# Patient Record
Sex: FEMALE | Race: WHITE | Employment: OTHER | URBAN - NONMETROPOLITAN AREA
[De-identification: names, ages, dates, MRNs, and addresses within clinical notes are randomized per-mention and may not be internally consistent; named-entity substitution may affect disease eponyms.]

---

## 2022-03-27 PROCEDURE — 99284 EMERGENCY DEPT VISIT MOD MDM: CPT

## 2022-03-28 ENCOUNTER — HOSPITAL ENCOUNTER (EMERGENCY)
Age: 77
Discharge: HOME | End: 2022-03-28
Attending: EMERGENCY MEDICINE
Payer: MEDICARE

## 2022-03-28 ENCOUNTER — APPOINTMENT (OUTPATIENT)
Dept: GENERAL RADIOLOGY | Age: 77
End: 2022-03-28
Payer: MEDICARE

## 2022-03-28 VITALS
OXYGEN SATURATION: 92 % | WEIGHT: 206 LBS | TEMPERATURE: 97.8 F | BODY MASS INDEX: 34.32 KG/M2 | DIASTOLIC BLOOD PRESSURE: 92 MMHG | HEIGHT: 65 IN | RESPIRATION RATE: 17 BRPM | SYSTOLIC BLOOD PRESSURE: 159 MMHG | HEART RATE: 87 BPM

## 2022-03-28 DIAGNOSIS — L03.116 CELLULITIS OF LEFT LOWER EXTREMITY: ICD-10-CM

## 2022-03-28 DIAGNOSIS — S90.222A SUBUNGUAL HEMATOMA OF TOE OF LEFT FOOT, INITIAL ENCOUNTER: ICD-10-CM

## 2022-03-28 DIAGNOSIS — S90.112A CONTUSION OF LEFT GREAT TOE WITHOUT DAMAGE TO NAIL, INITIAL ENCOUNTER: Primary | ICD-10-CM

## 2022-03-28 PROCEDURE — 73630 X-RAY EXAM OF FOOT: CPT

## 2022-03-28 RX ORDER — TRAZODONE HYDROCHLORIDE 50 MG/1
50 TABLET ORAL NIGHTLY PRN
COMMUNITY
End: 2022-09-26 | Stop reason: SDUPTHER

## 2022-03-28 RX ORDER — METFORMIN HYDROCHLORIDE 500 MG/1
500 TABLET, FILM COATED, EXTENDED RELEASE ORAL 2 TIMES DAILY WITH MEALS
COMMUNITY
End: 2022-07-11 | Stop reason: ALTCHOICE

## 2022-03-28 RX ORDER — VITAMIN B COMPLEX
1 CAPSULE ORAL DAILY
COMMUNITY

## 2022-03-28 RX ORDER — CEPHALEXIN 500 MG/1
500 CAPSULE ORAL 3 TIMES DAILY
Qty: 21 CAPSULE | Refills: 0 | Status: SHIPPED | OUTPATIENT
Start: 2022-03-28 | End: 2022-04-04

## 2022-03-28 RX ORDER — LEVOTHYROXINE SODIUM 0.2 MG/1
200 TABLET ORAL DAILY
COMMUNITY

## 2022-03-28 RX ORDER — LOSARTAN POTASSIUM AND HYDROCHLOROTHIAZIDE 12.5; 5 MG/1; MG/1
1 TABLET ORAL DAILY
COMMUNITY

## 2022-03-28 NOTE — ED PROVIDER NOTES
[address] [facility] [place] EMERGENCY DEPT  eMERGENCY dEPARTMENT eNCOUnter      Pt Name: Jaleesa Drummond  MRN: [id number]  [place] 1945  Date of evaluation: 3/27/2022  Provider: Papo Epps MD    [address]       Chief Complaint   Patient presents with    Leg Swelling     left leg; dropped vaccum on foot last week and has progressively worsened with swelling and redness since         HISTORY OF PRESENT ILLNESS   (Location/Symptom, Timing/Onset,Context/Setting, Quality, Duration, Modifying Factors, Severity)  Note limiting factors. Jaleesa Drummond is a 78 y.o. female who presents to the emergency department for evaluation regarding left foot injury. Patient states that last week she dropped a vacuum  on her foot and has had some worsening pain and swelling ever since. She describes the pain is sharp in nature and without radiation. She is able to bear weight on the foot however it is somewhat painful. No other injury was sustained. States that she chronically has some swelling of her lower extremities bilaterally. No prior history of pulmonary embolism or DVT. HPI    NursingNotes were reviewed. REVIEW OF SYSTEMS    (2-9 systems for level 4, 10 or more for level 5)     Review of Systems   Constitutional: Negative for fever. Respiratory: Negative for shortness of breath. Cardiovascular: Negative for chest pain. Gastrointestinal: Negative for abdominal pain and vomiting. Musculoskeletal: Positive for gait problem and joint swelling. All other systems reviewed and are negative.            PAST MEDICALHISTORY     Past Medical History:   Diagnosis Date    Diabetes mellitus ([address]     Hypertension     Non-alcoholic cirrhosis ([facility]     Thyroid disease          SURGICAL HISTORY       Past Surgical History:   Procedure Laterality Date    CHOLECYSTECTOMY  1978    HYSTERECTOMY  2000    OVARY REMOVAL Bilateral 2000    AGE 50         CURRENT MEDICATIONS     Discharge Medication List as of 3/28/2022 3:43 AM      CONTINUE these medications which have NOT CHANGED    Details   losartan-hydroCHLOROthiazide (HYZAAR) 50-12.5 MG per tablet Take 1 tablet by mouth dailyHistorical Med      levothyroxine (SYNTHROID) 200 MCG tablet Take 200 mcg by mouth DailyHistorical Med      metFORMIN, MOD, (GLUMETZA) 500 MG extended release tablet Take 500 mg by mouth daily (with breakfast)Historical Med      b complex vitamins capsule Take 1 capsule by mouth dailyHistorical Med      traZODone (DESYREL) 50 MG tablet Take 50 mg by mouth nightly as needed for Sleep 1/2 tablet nightly as neededHistorical Med             ALLERGIES     Patient has no known allergies. FAMILY HISTORY       Family History   Problem Relation Age of Onset    Heart Disease Mother     Breast Cancer Maternal Aunt 67    Breast Cancer Maternal Aunt 76          SOCIAL HISTORY       Social History     Socioeconomic History    Marital status:      Spouse name: None    Number of children: None    Years of education: None    Highest education level: None   Occupational History    None   Tobacco Use    Smoking status: Never Smoker    Smokeless tobacco: Never Used   Vaping Use    Vaping Use: Never used   Substance and Sexual Activity    Alcohol use: Never    Drug use: Never    Sexual activity: None   Other Topics Concern    None   Social History Narrative    None     Social Determinants of Health     Financial Resource Strain: Low Risk     Difficulty of Paying Living Expenses: Not hard at all   Food Insecurity: No Food Insecurity    Worried About Running Out of Food in the Last Year: Never true    [address] in the Last Year: Never true   Transportation Needs:     Lack of Transportation (Medical): Not on file    Lack of Transportation (Non-Medical):  Not on file   Physical Activity:     Days of Exercise per Week: Not on file    Minutes of Exercise per Session: Not on file   Stress:     Feeling of Stress : Not on file   Social Connections:     Frequency of Communication with Friends and Family: Not on file    Frequency of Social Gatherings with Friends and Family: Not on file    Attends Spiritism Services: Not on file    Active Member of Clubs or Organizations: Not on file    Attends Club or Organization Meetings: Not on file    Marital Status: Not on file   Intimate Partner Violence:     Fear of Current or Ex-Partner: Not on file    Emotionally Abused: Not on file    Physically Abused: Not on file    Sexually Abused: Not on file   Housing Stability:     Unable to Pay for Housing in the Last Year: Not on file    Number of [place] in the Last Year: Not on file    Unstable Housing in the Last Year: Not on file       SCREENINGS    Nate Coma Scale  Eye Opening: Spontaneous  Best Verbal Response: Oriented  Best Motor Response: Obeys commands  [place] Coma Scale Score: 15        PHYSICAL EXAM    (up to 7 for level 4, 8 or more for level 5)     ED Triage Vitals [03/27/22 2103]   BP Temp Temp Source Pulse Resp SpO2 Height Weight   (!) 190/83 97.8 °F (36.6 °C) Infrared 99 17 98 % 5' 5\" (1.651 m) 206 lb (93.4 kg)       Physical Exam  HENT:      Head: Atraumatic. Pulmonary:      Effort: No respiratory distress. Musculoskeletal:        Feet:       Comments: There is tenderness and swelling as noted on the diagram.  There is a small subungual hematoma in the left great toe. No tenderness in the midfoot or ankle area. Skin:            Comments: There is some overlying erythema in the left lower extremity that appears to be greater than the right. There is also some warmth. She appears to have some chronic venous stasis with some element of skin breakdown. No ulceration identified. Neurological:      Mental Status: She is alert.          DIAGNOSTIC RESULTS       RADIOLOGY:  Non-plain film images such as CT, Ultrasound and MRI are read by the radiologist. Plain radiographic images are visualized and preliminarily interpreted bythe emergency physician with the below findings:      No acute fracture identified. LABS:  Labs Reviewed - No data to display    All other labs were within normal range or not returned as of this dictation. EMERGENCY DEPARTMENT COURSE and DIFFERENTIAL DIAGNOSIS/MDM:   Vitals:    Vitals:    03/27/22 2103 03/28/22 0330   BP: (!) 190/83 (!) 159/92   Pulse: 99 87   Resp: 17    Temp: 97.8 °F (36.6 °C)    TempSrc: Infrared    SpO2: 98% 92%   Weight: 206 lb (93.4 kg)    Height: 5' 5\" (1.651 m)        MDM    PROCEDURES:  Unless otherwise noted below, none     Procedures    FINAL IMPRESSION      1. Contusion of left great toe without damage to nail, initial encounter    2. Subungual hematoma of toe of left foot, initial encounter    3.  Cellulitis of left lower extremity          DISPOSITION/PLAN   DISPOSITION Decision To Discharge 03/28/2022 03:39:31 AM      PATIENT REFERRED TO:  Linda Keita MD  [address]  [phone]            DISCHARGE MEDICATIONS:  Discharge Medication List as of 3/28/2022  3:43 AM      START taking these medications    Details   cephALEXin (KEFLEX) 500 MG capsule Take 1 capsule by mouth 3 times daily for 7 days, Disp-21 capsule, R-0Normal                (Please note that portions of this note were completed with a voice recognition program.  Efforts were made to edit thedictations but occasionally words are mis-transcribed.)    Author Dallas MD (electronically signed)  Attending Emergency Physician         Author Dallas MD  04/16/22 [id number]

## 2022-04-11 ENCOUNTER — OFFICE VISIT (OUTPATIENT)
Dept: INTERNAL MEDICINE | Age: 77
End: 2022-04-11
Payer: MEDICARE

## 2022-04-11 VITALS
BODY MASS INDEX: 34.99 KG/M2 | DIASTOLIC BLOOD PRESSURE: 60 MMHG | HEIGHT: 65 IN | SYSTOLIC BLOOD PRESSURE: 138 MMHG | OXYGEN SATURATION: 99 % | HEART RATE: 90 BPM | WEIGHT: 210 LBS

## 2022-04-11 DIAGNOSIS — E55.9 VITAMIN D DEFICIENCY: ICD-10-CM

## 2022-04-11 DIAGNOSIS — F51.01 PRIMARY INSOMNIA: ICD-10-CM

## 2022-04-11 DIAGNOSIS — E03.8 OTHER SPECIFIED HYPOTHYROIDISM: ICD-10-CM

## 2022-04-11 DIAGNOSIS — I10 PRIMARY HYPERTENSION: ICD-10-CM

## 2022-04-11 DIAGNOSIS — L03.116 CELLULITIS OF LEFT LOWER EXTREMITY: ICD-10-CM

## 2022-04-11 DIAGNOSIS — E53.8 VITAMIN B 12 DEFICIENCY: ICD-10-CM

## 2022-04-11 DIAGNOSIS — Z78.0 POSTMENOPAUSAL: ICD-10-CM

## 2022-04-11 DIAGNOSIS — K75.81 NASH (NONALCOHOLIC STEATOHEPATITIS): Primary | ICD-10-CM

## 2022-04-11 DIAGNOSIS — Z12.31 ENCOUNTER FOR SCREENING MAMMOGRAM FOR BREAST CANCER: ICD-10-CM

## 2022-04-11 DIAGNOSIS — E11.9 CONTROLLED TYPE 2 DIABETES MELLITUS WITHOUT COMPLICATION, WITHOUT LONG-TERM CURRENT USE OF INSULIN (HCC): ICD-10-CM

## 2022-04-11 PROBLEM — G47.00 INSOMNIA: Status: ACTIVE | Noted: 2022-04-11

## 2022-04-11 PROCEDURE — G8417 CALC BMI ABV UP PARAM F/U: HCPCS | Performed by: NURSE PRACTITIONER

## 2022-04-11 PROCEDURE — G8400 PT W/DXA NO RESULTS DOC: HCPCS | Performed by: NURSE PRACTITIONER

## 2022-04-11 PROCEDURE — 2022F DILAT RTA XM EVC RTNOPTHY: CPT | Performed by: NURSE PRACTITIONER

## 2022-04-11 PROCEDURE — 99204 OFFICE O/P NEW MOD 45 MIN: CPT | Performed by: NURSE PRACTITIONER

## 2022-04-11 PROCEDURE — 1123F ACP DISCUSS/DSCN MKR DOCD: CPT | Performed by: NURSE PRACTITIONER

## 2022-04-11 PROCEDURE — 1036F TOBACCO NON-USER: CPT | Performed by: NURSE PRACTITIONER

## 2022-04-11 PROCEDURE — 3046F HEMOGLOBIN A1C LEVEL >9.0%: CPT | Performed by: NURSE PRACTITIONER

## 2022-04-11 PROCEDURE — 4040F PNEUMOC VAC/ADMIN/RCVD: CPT | Performed by: NURSE PRACTITIONER

## 2022-04-11 PROCEDURE — G8427 DOCREV CUR MEDS BY ELIG CLIN: HCPCS | Performed by: NURSE PRACTITIONER

## 2022-04-11 PROCEDURE — 1090F PRES/ABSN URINE INCON ASSESS: CPT | Performed by: NURSE PRACTITIONER

## 2022-04-11 PROCEDURE — 3017F COLORECTAL CA SCREEN DOC REV: CPT | Performed by: NURSE PRACTITIONER

## 2022-04-11 SDOH — ECONOMIC STABILITY: FOOD INSECURITY: WITHIN THE PAST 12 MONTHS, YOU WORRIED THAT YOUR FOOD WOULD RUN OUT BEFORE YOU GOT MONEY TO BUY MORE.: NEVER TRUE

## 2022-04-11 SDOH — ECONOMIC STABILITY: FOOD INSECURITY: WITHIN THE PAST 12 MONTHS, THE FOOD YOU BOUGHT JUST DIDN'T LAST AND YOU DIDN'T HAVE MONEY TO GET MORE.: NEVER TRUE

## 2022-04-11 ASSESSMENT — PATIENT HEALTH QUESTIONNAIRE - PHQ9
SUM OF ALL RESPONSES TO PHQ9 QUESTIONS 1 & 2: 0
1. LITTLE INTEREST OR PLEASURE IN DOING THINGS: 0
SUM OF ALL RESPONSES TO PHQ QUESTIONS 1-9: 0
2. FEELING DOWN, DEPRESSED OR HOPELESS: 0

## 2022-04-11 ASSESSMENT — ENCOUNTER SYMPTOMS
SHORTNESS OF BREATH: 0
ABDOMINAL DISTENTION: 0
VOMITING: 0
WHEEZING: 0
DIARRHEA: 0
TROUBLE SWALLOWING: 0
NAUSEA: 0
COUGH: 0
CONSTIPATION: 0
EYE ITCHING: 0
CHOKING: 0
BLOOD IN STOOL: 0
EYE DISCHARGE: 0
STRIDOR: 0
COLOR CHANGE: 0
ABDOMINAL PAIN: 0
SORE THROAT: 0

## 2022-04-11 ASSESSMENT — SOCIAL DETERMINANTS OF HEALTH (SDOH): HOW HARD IS IT FOR YOU TO PAY FOR THE VERY BASICS LIKE FOOD, HOUSING, MEDICAL CARE, AND HEATING?: NOT HARD AT ALL

## 2022-04-11 NOTE — PATIENT INSTRUCTIONS
1.  Cellulitis  Resolved  2. MILLER:  Has been stable refer GI   3. Type II DM; Labs today   4. Hypertension; The current medical regimen is effective;  continue present plan and medications. 5. Hypothyroidism, The current medical regimen is effective;  continue present plan and medications. 6.  Insomnia; Stable with trazodone prn    7.  health maintenance;    Last colon 4 years ago   Setup dexa and mammogram;

## 2022-04-11 NOTE — PROGRESS NOTES
[facility] PHYSICIAN SERVICES  [facility] INTERNAL MEDICINE  [address] [address] [place] [address]  Dept: [phone]  Dept Fax: [phone]: [phone]    Jenny Mcdaniels (:  1945) is a 78 y.o. female,new Patient   with green, here for evaluation of the following chief complaint(s): Establish Care (was recently seen in er for celulitis and cephalenix 500 is finished.)      Jenny Mcdaniels is a 78 y.o. female who presents today for her medical conditions/complaints as noted below. Jenny Mcdaniels is c/byron Establish Care (was recently seen in er for celulitis and cephalenix 500 is finished.)        HPI:     Chief Complaint   Patient presents with   Scotty Espitia Establish Care     was recently seen in er for celulitis and cephalenix 500 is finished. HPI   1. Cellulitis had ER visit this weekend;   2. Monda Mass she tries to control with diet;  Meat seems to cause some swelling   Constipation   3. TYpe II DM metformin  500 BID  Her last a1c is usually 6.8  Or so    She has no hypogllycemia   4. Hypertension b/pi is good today   She is onl osartan /HCTZ 12.5 daily   5. Hypothyroid she is on 200 mcg daily levothyroxine  Last check was normal   6. Insomnia; Stable with trazodone 50 prn sleep  She doesn't take it every night;   7.   Health maintenance   Last colonoscopy 4 years  Ago wit 8 year plan  Mammogram she no longer gets mammograms since her     Past Medical History:   Diagnosis Date    Diabetes mellitus ([address]     Hypertension     Non-alcoholic cirrhosis ([address]     Thyroid disease       Past Surgical History:   Procedure Laterality Date    CHOLECYSTECTOMY      HYSTERECTOMY         Vitals 2022 3/28/2022    SYSTOLIC 634 822 160   DIASTOLIC 60 92 83   Pulse 90 87 99   Temp - - 97.8   Resp - - 17   SpO2 99 92 98   Weight 210 lb - 206 lb   Height 5' 5\" - 5' 5\"   Body mass index 34.94 kg/m2 - 34.28 kg/m2       Family History   Problem Relation Age of Onset    Heart Disease Mother        Social History     Tobacco Use    Smoking status: Never Smoker    Smokeless tobacco: Never Used   Substance Use Topics    Alcohol use: Never      Current Outpatient Medications   Medication Sig Dispense Refill    losartan-hydroCHLOROthiazide (HYZAAR) 50-12.5 MG per tablet Take 1 tablet by mouth daily      levothyroxine (SYNTHROID) 200 MCG tablet Take 200 mcg by mouth Daily      metFORMIN, MOD, (GLUMETZA) 500 MG extended release tablet Take 500 mg by mouth 2 times daily (with meals)       b complex vitamins capsule Take 1 capsule by mouth daily      traZODone (DESYREL) 50 MG tablet Take 50 mg by mouth nightly as needed for Sleep 1/2 tablet nightly as needed       No current facility-administered medications for this visit.      No Known Allergies    Health Maintenance   Topic Date Due    TSH testing  Never done    Potassium monitoring  Never done    Creatinine monitoring  Never done    Hepatitis C screen  Never done    COVID-19 Vaccine (1) Never done    Diabetic foot exam  Never done    A1C test (Diabetic or Prediabetic)  Never done    Lipid screen  Never done    Diabetic microalbuminuria test  Never done    Diabetic retinal exam  Never done    Colorectal Cancer Screen  Never done    Breast cancer screen  Never done    DEXA (modify frequency per FRAX score)  Never done    Annual Wellness Visit (AWV)  03/28/2022    DTaP/Tdap/Td vaccine (1 - Tdap) 05/02/2022 (Originally 5/10/1966)    Shingles Vaccine (1 of 2) 04/11/2023 (Originally 5/10/1997)    Pneumococcal 65+ years Vaccine (1 of 1 - PPSV23) 04/15/2024 (Originally 5/10/2012)    Flu vaccine (Season Ended) 09/01/2022    Depression Screen  04/11/2023    Hepatitis A vaccine  Aged Out    Hib vaccine  Aged Out    Meningococcal (ACWY) vaccine  Aged Out       No results found for: LABA1C  No results found for: PSA, PSADIA  No results found for: TSH]  No results found for: NA, K, CL, CO2, BUN, CREATININE, GLUCOSE, CALCIUM, PROT, LABALBU, BILITOT, ALKPHOS, AST, ALT, LABGLOM, GFRAA, AGRATIO, GLOB  No results found for: CHOL  No results found for: TRIG  No results found for: HDL  No results found for: LDLCHOLESTEROL, LDLCALC  No results found for: NA, K, CL, CO2, BUN, CREATININE, GLUCOSE, CALCIUM   No results found for: WBC, HGB, HCT, MCV, PLT, LABLYMP, MID, GRAN, LYMPHOPCT, MIDPERCENT, GRANULOCYTES, RBC, MCH, MCHC, RDW  No results found for: VITD25    Subjective:      Review of Systems   Constitutional: Negative for fatigue, fever and unexpected weight change. HENT: Negative for ear discharge, ear pain, mouth sores, sore throat and trouble swallowing. Eyes: Negative for discharge, itching and visual disturbance. Respiratory: Negative for cough, choking, shortness of breath, wheezing and stridor. Cardiovascular: Positive for leg swelling. Negative for chest pain and palpitations. Gastrointestinal: Negative for abdominal distention, abdominal pain, blood in stool, constipation, diarrhea, nausea and vomiting. Endocrine: Negative for cold intolerance, polydipsia and polyuria. Genitourinary: Negative for difficulty urinating, dysuria, frequency and urgency. Musculoskeletal: Negative for arthralgias and gait problem. Skin: Negative for color change and rash. Allergic/Immunologic: Negative for food allergies and immunocompromised state. Neurological: Negative for dizziness, tremors, syncope, speech difficulty, weakness and headaches. Hematological: Negative for adenopathy. Does not bruise/bleed easily. Psychiatric/Behavioral: Positive for sleep disturbance. Negative for confusion and hallucinations. Objective:     Physical Exam  Constitutional:       General: She is not in acute distress. Appearance: She is well-developed. HENT:      Head: Normocephalic and atraumatic. Eyes:      General: No scleral icterus. Right eye: No discharge. Left eye: No discharge.       Pupils: Pupils are equal, round, and reactive to light. Neck:      Thyroid: No thyromegaly. Vascular: No JVD. Cardiovascular:      Rate and Rhythm: Normal rate and regular rhythm. Heart sounds: Normal heart sounds. No murmur heard. Pulmonary:      Effort: Pulmonary effort is normal. No respiratory distress. Breath sounds: Normal breath sounds. No wheezing or rales. Abdominal:      General: Bowel sounds are normal. There is no distension. Palpations: Abdomen is soft. There is no mass. Tenderness: There is no abdominal tenderness. There is no guarding or rebound. Musculoskeletal:         General: No tenderness. Normal range of motion. Cervical back: Normal range of motion and neck supple. Right lower leg: 3+ Edema present. Left lower leg: 3+ Edema present. Skin:     General: Skin is warm and dry. Findings: No erythema or rash. Neurological:      Mental Status: She is alert and oriented to person, place, and time. Cranial Nerves: No cranial nerve deficit. Coordination: Coordination normal.      Deep Tendon Reflexes: Reflexes are normal and symmetric. Reflexes normal.   Psychiatric:         Mood and Affect: Mood is not depressed. Behavior: Behavior normal.         Thought Content:  Thought content normal.         Judgment: Judgment normal.       /60   Pulse 90   Ht 5' 5\" (1.651 m)   Wt 210 lb (95.3 kg)   SpO2 99%   BMI 34.95 kg/m²           Assessment:      Problem List     Cellulitis of left lower extremity     Has resolved with keflex           Controlled type 2 diabetes mellitus without complication, without long-term current use of insulin (HCC)     Stable with metformin 500 BID           Relevant Medications    metFORMIN, MOD, (GLUMETZA) 500 MG extended release tablet    Other Relevant Orders    Hemoglobin A1C    Insomnia     Stable with prn use of trazodone           MILLER (nonalcoholic steatohepatitis) - Primary     She was seen by specialist but told her nothing really to do           Relevant Orders    Hepatitis C Antibody    Other specified hypothyroidism     Stable with 200 mcg levothyroxine           Relevant Medications    levothyroxine (SYNTHROID) 200 MCG tablet    Other Relevant Orders    TSH    Primary hypertension     Stable with losartan 50/hctz  12.5           Relevant Orders    CBC with Auto Differential    Comprehensive Metabolic Panel    Urinalysis with Reflex to Culture    Lipid Panel          Plan:        Patient given educational materials - see patient instructions. Discussed use, benefit, and side effects of prescribed medications. Allpatient questions answered. Pt voiced understanding. Reviewed health maintenance. Instructed to continue current medications, diet and exercise. Patient agreed with treatment plan. Follow up as directed. MEDICATIONS:  No orders of the defined types were placed in this encounter. ORDERS:  Orders Placed This Encounter   Procedures    SETH DIGITAL SCREEN W OR WO CAD BILATERAL    DEXA BONE DENSITY 2 SITES    Hepatitis C Antibody    CBC with Auto Differential    Comprehensive Metabolic Panel    Hemoglobin A1C    Vitamin D 25 Hydroxy    Urinalysis with Reflex to Culture    Lipid Panel    TSH       Follow-up:  Return in about 3 months (around 7/11/2022) for have labs done prior to appt. PATIENT INSTRUCTIONS:  Patient Instructions   1. Cellulitis  Resolved  2. MILLER:  Has been stable refer GI   3. Type II DM; Labs today   4. Hypertension; The current medical regimen is effective;  continue present plan and medications. 5. Hypothyroidism, The current medical regimen is effective;  continue present plan and medications. 6.  Insomnia; Stable with trazodone prn    7.  health maintenance;    Last colon 4 years ago   Setup dexa and mammogram;       Electronically signed by KATIA Robbins on 4/11/2022 at 11:32 AM    @    Jazmin/transcription disclaimer:  Much of this encounter note is electronic transcription/translation of spoken language to printed texts. The electronic translation of spoken language may be erroneous, or at times,nonsensical words or phrases may be inadvertently transcribed.   Although I have reviewed the note for such errors, some may still exist.

## 2022-04-14 ENCOUNTER — HOSPITAL ENCOUNTER (OUTPATIENT)
Dept: WOMENS IMAGING | Age: 77
Discharge: HOME | End: 2022-04-14
Payer: MEDICARE

## 2022-04-14 ENCOUNTER — TELEPHONE (OUTPATIENT)
Dept: INTERNAL MEDICINE | Age: 77
End: 2022-04-14

## 2022-04-14 DIAGNOSIS — Z78.0 POSTMENOPAUSAL: ICD-10-CM

## 2022-04-14 DIAGNOSIS — Z12.31 ENCOUNTER FOR SCREENING MAMMOGRAM FOR BREAST CANCER: ICD-10-CM

## 2022-04-14 PROCEDURE — 77080 DXA BONE DENSITY AXIAL: CPT

## 2022-04-14 PROCEDURE — 77063 BREAST TOMOSYNTHESIS BI: CPT

## 2022-04-14 NOTE — TELEPHONE ENCOUNTER
I called and left message for patient to call us back with the location so we could request the results of c-scope and I can scanned it into her chart.

## 2022-04-14 NOTE — TELEPHONE ENCOUNTER
----- Message from Shannon Alan sent at 4/14/2022 10:26 AM CDT -----  Subject: Message to Provider    QUESTIONS  Information for Provider? Pt the last colonoscopy was 6/9/14 This was the   information her doctor was waiting for.   ---------------------------------------------------------------------------  --------------  [address]  What is the best way for the office to contact you? OK to leave message on   voicemail  Preferred Call Back Phone Number? [phone]  ---------------------------------------------------------------------------  --------------  SCRIPT ANSWERS  Relationship to Patient?  Self

## 2022-04-15 ENCOUNTER — HOSPITAL ENCOUNTER (OUTPATIENT)
Dept: WOMENS IMAGING | Age: 77
Discharge: HOME | End: 2022-04-15
Payer: MEDICARE

## 2022-04-15 DIAGNOSIS — N64.89 BREAST ASYMMETRY: ICD-10-CM

## 2022-04-15 DIAGNOSIS — E03.8 OTHER SPECIFIED HYPOTHYROIDISM: ICD-10-CM

## 2022-04-15 DIAGNOSIS — I10 PRIMARY HYPERTENSION: ICD-10-CM

## 2022-04-15 DIAGNOSIS — E11.9 CONTROLLED TYPE 2 DIABETES MELLITUS WITHOUT COMPLICATION, WITHOUT LONG-TERM CURRENT USE OF INSULIN (HCC): ICD-10-CM

## 2022-04-15 DIAGNOSIS — E55.9 VITAMIN D DEFICIENCY: ICD-10-CM

## 2022-04-15 DIAGNOSIS — K75.81 NASH (NONALCOHOLIC STEATOHEPATITIS): ICD-10-CM

## 2022-04-15 DIAGNOSIS — E53.8 VITAMIN B 12 DEFICIENCY: ICD-10-CM

## 2022-04-15 LAB
ALBUMIN SERPL-MCNC: 3.9 G/DL (ref 3.5–5.2)
ALP BLD-CCNC: 136 U/L (ref 35–104)
ALT SERPL-CCNC: 23 U/L (ref 5–33)
ANION GAP SERPL CALCULATED.3IONS-SCNC: 11 MMOL/L (ref 7–19)
AST SERPL-CCNC: 41 U/L (ref 5–32)
BACTERIA: ABNORMAL /HPF
BASOPHILS ABSOLUTE: 0 K/UL (ref 0–0.2)
BASOPHILS RELATIVE PERCENT: 0.4 % (ref 0–1)
BILIRUB SERPL-MCNC: 1.4 MG/DL (ref 0.2–1.2)
BILIRUBIN URINE: NEGATIVE
BLOOD, URINE: NEGATIVE
BUN BLDV-MCNC: 13 MG/DL (ref 8–23)
CALCIUM SERPL-MCNC: 9.9 MG/DL (ref 8.8–10.2)
CHLORIDE BLD-SCNC: 104 MMOL/L (ref 98–111)
CHOLESTEROL, TOTAL: 170 MG/DL (ref 160–199)
CLARITY: CLEAR
CO2: 28 MMOL/L (ref 22–29)
COLOR: YELLOW
CREAT SERPL-MCNC: 0.8 MG/DL (ref 0.5–0.9)
CRYSTALS, UA: ABNORMAL /HPF
EOSINOPHILS ABSOLUTE: 0.2 K/UL (ref 0–0.6)
EOSINOPHILS RELATIVE PERCENT: 4.6 % (ref 0–5)
EPITHELIAL CELLS, UA: 10 /HPF (ref 0–5)
GFR AFRICAN AMERICAN: >59
GFR NON-AFRICAN AMERICAN: >60
GLUCOSE BLD-MCNC: 133 MG/DL (ref 74–109)
GLUCOSE URINE: NEGATIVE MG/DL
HBA1C MFR BLD: 5.4 % (ref 4–6)
HCT VFR BLD CALC: 34.7 % (ref 37–47)
HDLC SERPL-MCNC: 71 MG/DL (ref 65–121)
HEMOGLOBIN: 11.6 G/DL (ref 12–16)
HEPATITIS C ANTIBODY INTERPRETATION: NORMAL
HYALINE CASTS: 7 /HPF (ref 0–8)
IMMATURE GRANULOCYTES #: 0 K/UL
KETONES, URINE: NEGATIVE MG/DL
LDL CHOLESTEROL CALCULATED: 79 MG/DL
LEUKOCYTE ESTERASE, URINE: ABNORMAL
LYMPHOCYTES ABSOLUTE: 1.2 K/UL (ref 1.1–4.5)
LYMPHOCYTES RELATIVE PERCENT: 22.4 % (ref 20–40)
MCH RBC QN AUTO: 30.8 PG (ref 27–31)
MCHC RBC AUTO-ENTMCNC: 33.4 G/DL (ref 33–37)
MCV RBC AUTO: 92 FL (ref 81–99)
MONOCYTES ABSOLUTE: 0.4 K/UL (ref 0–0.9)
MONOCYTES RELATIVE PERCENT: 6.6 % (ref 0–10)
NEUTROPHILS ABSOLUTE: 3.5 K/UL (ref 1.5–7.5)
NEUTROPHILS RELATIVE PERCENT: 65.8 % (ref 50–65)
NITRITE, URINE: NEGATIVE
PDW BLD-RTO: 15.1 % (ref 11.5–14.5)
PH UA: 8 (ref 5–8)
PLATELET # BLD: 88 K/UL (ref 130–400)
PMV BLD AUTO: 10.1 FL (ref 9.4–12.3)
POTASSIUM SERPL-SCNC: 4.6 MMOL/L (ref 3.5–5)
PROTEIN UA: NEGATIVE MG/DL
RBC # BLD: 3.77 M/UL (ref 4.2–5.4)
RBC UA: 1 /HPF (ref 0–4)
SODIUM BLD-SCNC: 143 MMOL/L (ref 136–145)
SPECIFIC GRAVITY UA: 1.01 (ref 1–1.03)
TOTAL PROTEIN: 6.6 G/DL (ref 6.6–8.7)
TRIGL SERPL-MCNC: 100 MG/DL (ref 0–149)
TSH SERPL DL<=0.05 MIU/L-ACNC: 0.02 UIU/ML (ref 0.27–4.2)
UROBILINOGEN, URINE: 1 E.U./DL
VITAMIN B-12: 816 PG/ML (ref 211–946)
VITAMIN D 25-HYDROXY: 37.5 NG/ML
WBC # BLD: 5.3 K/UL (ref 4.8–10.8)
WBC UA: 2 /HPF (ref 0–5)

## 2022-04-15 PROCEDURE — 77065 DX MAMMO INCL CAD UNI: CPT

## 2022-04-16 ASSESSMENT — ENCOUNTER SYMPTOMS
VOMITING: 0
SHORTNESS OF BREATH: 0
ABDOMINAL PAIN: 0

## 2022-04-18 DIAGNOSIS — E03.8 OTHER SPECIFIED HYPOTHYROIDISM: Primary | ICD-10-CM

## 2022-04-19 DIAGNOSIS — R92.1 CALCIFICATION OF BREAST: Primary | ICD-10-CM

## 2022-04-27 ENCOUNTER — HOSPITAL ENCOUNTER (OUTPATIENT)
Dept: WOMENS IMAGING | Age: 77
Discharge: HOME | End: 2022-04-27
Payer: MEDICARE

## 2022-04-27 DIAGNOSIS — R92.0 MICROCALCIFICATIONS OF THE BREAST: ICD-10-CM

## 2022-04-27 PROCEDURE — 2720000010 MAM STEREO BREAST BX W LOC DEVICE 1ST LESION RIGHT

## 2022-04-27 PROCEDURE — 77065 DX MAMMO INCL CAD UNI: CPT

## 2022-04-27 PROCEDURE — 88305 TISSUE EXAM BY PATHOLOGIST: CPT

## 2022-05-02 ENCOUNTER — TELEPHONE (OUTPATIENT)
Dept: INTERNAL MEDICINE | Age: 77
End: 2022-05-02

## 2022-05-02 RX ORDER — ERGOCALCIFEROL 1.25 MG/1
50000 CAPSULE ORAL WEEKLY
Qty: 12 CAPSULE | Refills: 1 | Status: SHIPPED | OUTPATIENT
Start: 2022-05-02 | End: 2022-10-18

## 2022-05-02 NOTE — TELEPHONE ENCOUNTER
Pt said was told at visit we will send in a rx for vit d and it has never been sent. Dont see strength in note? ??

## 2022-05-02 NOTE — TELEPHONE ENCOUNTER
Amairani Patron called requesting a refill of the below medication which has been pended for you:     Requested Prescriptions     Pending Prescriptions Disp Refills    vitamin D (ERGOCALCIFEROL) 1.25 MG (77451 UT) CAPS capsule 12 capsule 1     Sig: Take 1 capsule by mouth once a week       Last Appointment Date: 4/11/2022  Next Appointment Date: 7/11/2022    No Known Allergies

## 2022-05-03 ENCOUNTER — TELEPHONE (OUTPATIENT)
Dept: INTERNAL MEDICINE | Age: 77
End: 2022-05-03

## 2022-05-03 DIAGNOSIS — Z12.31 ENCOUNTER FOR SCREENING MAMMOGRAM FOR MALIGNANT NEOPLASM OF BREAST: Primary | ICD-10-CM

## 2022-05-03 DIAGNOSIS — R92.0 MICROCALCIFICATIONS OF THE BREAST: ICD-10-CM

## 2022-05-03 NOTE — TELEPHONE ENCOUNTER
----- Message from KATIA Jackson sent at 5/1/2022  5:48 PM CDT -----  Pelase go ahead and put in 6 month fu for mammogram

## 2022-05-05 ENCOUNTER — TELEPHONE (OUTPATIENT)
Dept: INTERNAL MEDICINE | Age: 77
End: 2022-05-05

## 2022-05-05 NOTE — TELEPHONE ENCOUNTER
----- Message from KATIA Cruz sent at 5/5/2022  7:19 AM CDT -----  Please make this into the chart note  I cannot find her way let her know that her biopsy was negative for malignancy.   Please let her know that in addition let her know that we will be doing a 6-month mammogram for follow-up in November I think of already asking to put that order in

## 2022-05-13 ENCOUNTER — OFFICE VISIT (OUTPATIENT)
Dept: GASTROENTEROLOGY | Age: 77
End: 2022-05-13
Payer: MEDICARE

## 2022-05-13 VITALS
SYSTOLIC BLOOD PRESSURE: 139 MMHG | HEART RATE: 86 BPM | DIASTOLIC BLOOD PRESSURE: 70 MMHG | OXYGEN SATURATION: 98 % | HEIGHT: 65 IN | WEIGHT: 212 LBS | BODY MASS INDEX: 35.32 KG/M2

## 2022-05-13 DIAGNOSIS — E66.9 CLASS 2 OBESITY WITH BODY MASS INDEX (BMI) OF 35.0 TO 35.9 IN ADULT, UNSPECIFIED OBESITY TYPE, UNSPECIFIED WHETHER SERIOUS COMORBIDITY PRESENT: ICD-10-CM

## 2022-05-13 DIAGNOSIS — K76.0 FATTY LIVER: Primary | ICD-10-CM

## 2022-05-13 PROBLEM — E66.812 CLASS 2 OBESITY WITH BODY MASS INDEX (BMI) OF 35.0 TO 35.9 IN ADULT: Status: ACTIVE | Noted: 2022-05-13

## 2022-05-13 PROCEDURE — G8417 CALC BMI ABV UP PARAM F/U: HCPCS | Performed by: NURSE PRACTITIONER

## 2022-05-13 PROCEDURE — G8427 DOCREV CUR MEDS BY ELIG CLIN: HCPCS | Performed by: NURSE PRACTITIONER

## 2022-05-13 PROCEDURE — 3017F COLORECTAL CA SCREEN DOC REV: CPT | Performed by: NURSE PRACTITIONER

## 2022-05-13 PROCEDURE — 99203 OFFICE O/P NEW LOW 30 MIN: CPT | Performed by: NURSE PRACTITIONER

## 2022-05-13 PROCEDURE — G8399 PT W/DXA RESULTS DOCUMENT: HCPCS | Performed by: NURSE PRACTITIONER

## 2022-05-13 PROCEDURE — 1123F ACP DISCUSS/DSCN MKR DOCD: CPT | Performed by: NURSE PRACTITIONER

## 2022-05-13 PROCEDURE — 1036F TOBACCO NON-USER: CPT | Performed by: NURSE PRACTITIONER

## 2022-05-13 PROCEDURE — 1090F PRES/ABSN URINE INCON ASSESS: CPT | Performed by: NURSE PRACTITIONER

## 2022-05-13 PROCEDURE — 4040F PNEUMOC VAC/ADMIN/RCVD: CPT | Performed by: NURSE PRACTITIONER

## 2022-05-13 ASSESSMENT — ENCOUNTER SYMPTOMS
SHORTNESS OF BREATH: 0
ANAL BLEEDING: 0
DIARRHEA: 0
COUGH: 0
RECTAL PAIN: 0
BLOOD IN STOOL: 0
NAUSEA: 0
ABDOMINAL DISTENTION: 0
CONSTIPATION: 0
SORE THROAT: 0
BACK PAIN: 0
VOMITING: 0
TROUBLE SWALLOWING: 0
VOICE CHANGE: 0
ABDOMINAL PAIN: 0

## 2022-05-13 NOTE — PROGRESS NOTES
Subjective:      Magaly Diaz is a76 y.o. female  Chief Complaint   Patient presents with    New Patient     fatty liver disease       HPI  PCP: KATIA Abraham  Referring Provider: KATIA Anaya  New pt referral  From PCP  For fatty liver. Reports she was diagnosed with fatty liver in 2018. Recent labs 4/2022 reveals normal ALT, AST of 41 (<32)  Is obese with BMI of 35      Reports she had an episode of constipation recently but that resolved and is back to soft formed BMs daily  No further GI complaints    Reports her last colonoscopy was in 2014 and she was given a 10 yr recall  No personal or family hx of colon polyps/colon cancer      Family HX:    Pt denies family hx of colon polyps, colon CA, inflammatory bowel dx, gastric CA and esophageal CA.     Past Medical History:   Diagnosis Date    Diabetes mellitus ([facility]     Hypertension     Non-alcoholic cirrhosis ([facility]     Thyroid disease           Past Surgical History:   Procedure Laterality Date    CHOLECYSTECTOMY  1978    COLONOSCOPY  2014    normal, per pt    HYSTERECTOMY  2000    SETH STEROTACTIC LOC BREAST BIOPSY RIGHT Right 04/27/2022    SETH STEROTACTIC LOC BREAST BIOPSY RIGHT 4/27/2022 [facility]    OVARY REMOVAL Bilateral 2000    AGE 50       Social History     Socioeconomic History    Marital status:      Spouse name: None    Number of children: None    Years of education: None    Highest education level: None   Occupational History    None   Tobacco Use    Smoking status: Never Smoker    Smokeless tobacco: Never Used   Vaping Use    Vaping Use: Never used   Substance and Sexual Activity    Alcohol use: Never    Drug use: Never    Sexual activity: None   Other Topics Concern    None   Social History Narrative    None     Social Determinants of Health     Financial Resource Strain: Low Risk     Difficulty of Paying Living Expenses: Not hard at all   Food Insecurity: No Food Insecurity    Worried About Running Out of Food in the Last Year: Never true    Ran Out of Food in the Last Year: Never true   Transportation Needs:     Lack of Transportation (Medical): Not on file    Lack of Transportation (Non-Medical): Not on file   Physical Activity:     Days of Exercise per Week: Not on file    Minutes of Exercise per Session: Not on file   Stress:     Feeling of Stress : Not on file   Social Connections:     Frequency of Communication with Friends and Family: Not on file    Frequency of Social Gatherings with Friends and Family: Not on file    Attends [employer] Services: Not on file    Active Member of [address] [place] [address] [employer]," or Organizations: Not on file    Attends Club or Organization Meetings: Not on file    Marital Status: Not on file   Intimate Partner Violence:     Fear of Current or Ex-Partner: Not on file    Emotionally Abused: Not on file    Physically Abused: Not on file    Sexually Abused: Not on file   Housing Stability:     Unable to Pay for Housing in the Last Year: Not on file    Number of [place] in the Last Year: Not on file    Unstable Housing in the Last Year: Not on file       No Known Allergies    Current Outpatient Medications   Medication Sig Dispense Refill    vitamin D (ERGOCALCIFEROL) 1.25 MG (11746 UT) CAPS capsule Take 1 capsule by mouth once a week 12 capsule 1    losartan-hydroCHLOROthiazide (HYZAAR) 50-12.5 MG per tablet Take 1 tablet by mouth daily      levothyroxine (SYNTHROID) 200 MCG tablet Take 200 mcg by mouth Daily      metFORMIN, MOD, (GLUMETZA) 500 MG extended release tablet Take 500 mg by mouth 2 times daily (with meals)       b complex vitamins capsule Take 1 capsule by mouth daily      traZODone (DESYREL) 50 MG tablet Take 50 mg by mouth nightly as needed for Sleep 1/2 tablet nightly as needed       No current facility-administered medications for this visit. Review of Systems   Constitutional: Positive for unexpected weight change (gain).  Negative for appetite change, fatigue and fever. HENT: Negative for sore throat, trouble swallowing and voice change. Respiratory: Negative for cough and shortness of breath. Cardiovascular: Negative for chest pain, palpitations and leg swelling. Gastrointestinal: Negative for abdominal distention, abdominal pain, anal bleeding, blood in stool, constipation, diarrhea, nausea, rectal pain and vomiting. Genitourinary: Negative for hematuria. Musculoskeletal: Negative for arthralgias, back pain and neck pain. Neurological: Negative for dizziness, weakness, light-headedness and headaches. Psychiatric/Behavioral: Positive for dysphoric mood. Negative for sleep disturbance. The patient is nervous/anxious. All other systems reviewed and are negative. Objective:     Physical Exam  Vitals and nursing note reviewed. Constitutional:       Appearance: She is well-developed. Comments: /70 (Site: Left Upper Arm)   Pulse 86   Ht 5' 5\" (1.651 m)   Wt 212 lb (96.2 kg)   SpO2 98%   BMI 35.28 kg/m²    Eyes:      General: No scleral icterus. Conjunctiva/sclera: Conjunctivae normal.      Pupils: Pupils are equal, round, and reactive to light. Neck:      Thyroid: No thyromegaly. Cardiovascular:      Rate and Rhythm: Normal rate and regular rhythm. Heart sounds: Normal heart sounds. No murmur heard. No friction rub. No gallop. Pulmonary:      Effort: Pulmonary effort is normal. No respiratory distress. Breath sounds: Normal breath sounds. Abdominal:      General: Bowel sounds are normal. There is no distension. Palpations: Abdomen is soft. Tenderness: There is no abdominal tenderness. There is no rebound. Musculoskeletal:         General: No deformity. Normal range of motion. Cervical back: Normal range of motion and neck supple. Neurological:      Mental Status: She is alert and oriented to person, place, and time. Cranial Nerves: No cranial nerve deficit. Psychiatric:         Judgment: Judgment normal.           Assessment:       Diagnosis Orders   1. Fatty liver  US LIVER   2. Class 2 obesity with body mass index (BMI) of 35.0 to 35.9 in adult, unspecified obesity type, unspecified whether serious comorbidity present           Plan: 1. Liver US. Will call her with results. If stable recommend fatty liver surveillance annually. Recommend weight loss to get to goal weight. And eat low saturated fat diet. Offered referral to dietician, she declines.

## 2022-05-13 NOTE — PATIENT INSTRUCTIONS
Patient calling returning call to Zara (see note from 11/8/17) to schedule a follow up, patient is scheduled for Thursday 12/7/17.   We will get an ultrasound of your liver and call you with results

## 2022-06-20 ENCOUNTER — HOSPITAL ENCOUNTER (OUTPATIENT)
Dept: ULTRASOUND IMAGING | Age: 77
Discharge: HOME | End: 2022-06-20
Payer: MEDICARE

## 2022-06-20 DIAGNOSIS — K76.0 FATTY LIVER: ICD-10-CM

## 2022-06-20 PROCEDURE — 76705 ECHO EXAM OF ABDOMEN: CPT

## 2022-06-20 PROCEDURE — 76705 ECHO EXAM OF ABDOMEN: CPT | Performed by: RADIOLOGY

## 2022-06-21 ENCOUNTER — TELEPHONE (OUTPATIENT)
Dept: GASTROENTEROLOGY | Age: 77
End: 2022-06-21

## 2022-06-21 NOTE — TELEPHONE ENCOUNTER
Please let Helen know I have reviewed the results of the liver ultrasound  It reveals a normal appearing liver. No mention of fatty liver noted by the radiologist.  No f/u for this is needed. It does reveal her speen is mildly enlarged. She can f/u with her PCP for this if her PCP wants to monitor this or she can see hematology for this if she wants. Common bile duct is mildly dilated r/t reservoir effect (occurs when gallbladder is removed, nothing to do for this).   thanks

## 2022-06-22 NOTE — TELEPHONE ENCOUNTER
6-22-22    THANKS OTONIEL,  PT'S BEEN NOTIFIED OF RESULTS AND RECOMMENDATIONS.     RESULTS HAVE BEEN FAXED TO PCP

## 2022-07-11 ENCOUNTER — OFFICE VISIT (OUTPATIENT)
Dept: INTERNAL MEDICINE | Age: 77
End: 2022-07-11
Payer: MEDICARE

## 2022-07-11 VITALS
SYSTOLIC BLOOD PRESSURE: 138 MMHG | HEIGHT: 65 IN | DIASTOLIC BLOOD PRESSURE: 60 MMHG | HEART RATE: 86 BPM | OXYGEN SATURATION: 97 % | WEIGHT: 214 LBS | BODY MASS INDEX: 35.65 KG/M2

## 2022-07-11 DIAGNOSIS — E55.9 VITAMIN D DEFICIENCY: ICD-10-CM

## 2022-07-11 DIAGNOSIS — D69.6 THROMBOCYTOPENIA (HCC): ICD-10-CM

## 2022-07-11 DIAGNOSIS — E11.9 CONTROLLED TYPE 2 DIABETES MELLITUS WITHOUT COMPLICATION, WITHOUT LONG-TERM CURRENT USE OF INSULIN (HCC): ICD-10-CM

## 2022-07-11 DIAGNOSIS — R63.5 WEIGHT GAIN: ICD-10-CM

## 2022-07-11 DIAGNOSIS — R60.0 LOCALIZED EDEMA: ICD-10-CM

## 2022-07-11 DIAGNOSIS — E03.8 OTHER SPECIFIED HYPOTHYROIDISM: ICD-10-CM

## 2022-07-11 DIAGNOSIS — K75.81 NASH (NONALCOHOLIC STEATOHEPATITIS): ICD-10-CM

## 2022-07-11 DIAGNOSIS — I10 PRIMARY HYPERTENSION: ICD-10-CM

## 2022-07-11 DIAGNOSIS — I10 PRIMARY HYPERTENSION: Primary | ICD-10-CM

## 2022-07-11 PROBLEM — L03.116 CELLULITIS OF LEFT LOWER EXTREMITY: Status: RESOLVED | Noted: 2022-04-11 | Resolved: 2022-07-11

## 2022-07-11 PROBLEM — K76.0 FATTY LIVER: Status: RESOLVED | Noted: 2022-05-13 | Resolved: 2022-07-11

## 2022-07-11 LAB
ALBUMIN SERPL-MCNC: 3.3 G/DL (ref 3.5–5.2)
ALP BLD-CCNC: 131 U/L (ref 35–104)
ALT SERPL-CCNC: 19 U/L (ref 5–33)
ANION GAP SERPL CALCULATED.3IONS-SCNC: 10 MMOL/L (ref 7–19)
AST SERPL-CCNC: 43 U/L (ref 5–32)
BASOPHILS ABSOLUTE: 0 K/UL (ref 0–0.2)
BASOPHILS RELATIVE PERCENT: 0.3 % (ref 0–1)
BILIRUB SERPL-MCNC: 1.2 MG/DL (ref 0.2–1.2)
BILIRUBIN URINE: NEGATIVE
BLOOD, URINE: NEGATIVE
BUN BLDV-MCNC: 14 MG/DL (ref 8–23)
CALCIUM SERPL-MCNC: 9.4 MG/DL (ref 8.8–10.2)
CHLORIDE BLD-SCNC: 107 MMOL/L (ref 98–111)
CLARITY: CLEAR
CO2: 25 MMOL/L (ref 22–29)
COLOR: YELLOW
CREAT SERPL-MCNC: 0.8 MG/DL (ref 0.5–0.9)
CREATININE URINE: 55.1 MG/DL (ref 4.2–622)
EOSINOPHILS ABSOLUTE: 0.1 K/UL (ref 0–0.6)
EOSINOPHILS RELATIVE PERCENT: 1.6 % (ref 0–5)
GFR AFRICAN AMERICAN: >59
GFR NON-AFRICAN AMERICAN: >60
GLUCOSE BLD-MCNC: 128 MG/DL (ref 74–109)
GLUCOSE URINE: NEGATIVE MG/DL
HBA1C MFR BLD: 5.5 % (ref 4–6)
HCT VFR BLD CALC: 34.2 % (ref 37–47)
HEMOGLOBIN: 11.1 G/DL (ref 12–16)
IMMATURE GRANULOCYTES #: 0 K/UL
KETONES, URINE: NEGATIVE MG/DL
LEUKOCYTE ESTERASE, URINE: NEGATIVE
LYMPHOCYTES ABSOLUTE: 1.5 K/UL (ref 1.1–4.5)
LYMPHOCYTES RELATIVE PERCENT: 49 % (ref 20–40)
MCH RBC QN AUTO: 30.7 PG (ref 27–31)
MCHC RBC AUTO-ENTMCNC: 32.5 G/DL (ref 33–37)
MCV RBC AUTO: 94.7 FL (ref 81–99)
MICROALBUMIN UR-MCNC: <1.2 MG/DL (ref 0–19)
MICROALBUMIN/CREAT UR-RTO: NORMAL MG/G
MONOCYTES ABSOLUTE: 0.3 K/UL (ref 0–0.9)
MONOCYTES RELATIVE PERCENT: 9.4 % (ref 0–10)
NEUTROPHILS ABSOLUTE: 1.2 K/UL (ref 1.5–7.5)
NEUTROPHILS RELATIVE PERCENT: 39.4 % (ref 50–65)
NITRITE, URINE: NEGATIVE
PDW BLD-RTO: 14.4 % (ref 11.5–14.5)
PH UA: 7.5 (ref 5–8)
PLATELET # BLD: 72 K/UL (ref 130–400)
PMV BLD AUTO: 10.8 FL (ref 9.4–12.3)
POTASSIUM SERPL-SCNC: 4.1 MMOL/L (ref 3.5–5)
PROTEIN UA: NEGATIVE MG/DL
RBC # BLD: 3.61 M/UL (ref 4.2–5.4)
SODIUM BLD-SCNC: 142 MMOL/L (ref 136–145)
SPECIFIC GRAVITY UA: 1.01 (ref 1–1.03)
TOTAL PROTEIN: 6.9 G/DL (ref 6.6–8.7)
TSH SERPL DL<=0.05 MIU/L-ACNC: 0.16 UIU/ML (ref 0.27–4.2)
UROBILINOGEN, URINE: 1 E.U./DL
VITAMIN D 25-HYDROXY: 39.6 NG/ML
WBC # BLD: 3.1 K/UL (ref 4.8–10.8)

## 2022-07-11 PROCEDURE — 2028F FOOT EXAM PERFORMED: CPT | Performed by: NURSE PRACTITIONER

## 2022-07-11 PROCEDURE — G8427 DOCREV CUR MEDS BY ELIG CLIN: HCPCS | Performed by: NURSE PRACTITIONER

## 2022-07-11 PROCEDURE — 1090F PRES/ABSN URINE INCON ASSESS: CPT | Performed by: NURSE PRACTITIONER

## 2022-07-11 PROCEDURE — 3044F HG A1C LEVEL LT 7.0%: CPT | Performed by: NURSE PRACTITIONER

## 2022-07-11 PROCEDURE — 2022F DILAT RTA XM EVC RTNOPTHY: CPT | Performed by: NURSE PRACTITIONER

## 2022-07-11 PROCEDURE — 1036F TOBACCO NON-USER: CPT | Performed by: NURSE PRACTITIONER

## 2022-07-11 PROCEDURE — 99214 OFFICE O/P EST MOD 30 MIN: CPT | Performed by: NURSE PRACTITIONER

## 2022-07-11 PROCEDURE — G8417 CALC BMI ABV UP PARAM F/U: HCPCS | Performed by: NURSE PRACTITIONER

## 2022-07-11 PROCEDURE — G8399 PT W/DXA RESULTS DOCUMENT: HCPCS | Performed by: NURSE PRACTITIONER

## 2022-07-11 PROCEDURE — 3017F COLORECTAL CA SCREEN DOC REV: CPT | Performed by: NURSE PRACTITIONER

## 2022-07-11 PROCEDURE — 1123F ACP DISCUSS/DSCN MKR DOCD: CPT | Performed by: NURSE PRACTITIONER

## 2022-07-11 RX ORDER — HYDROCHLOROTHIAZIDE 12.5 MG/1
12.5 CAPSULE, GELATIN COATED ORAL DAILY
Qty: 30 CAPSULE | Refills: 3 | Status: SHIPPED | OUTPATIENT
Start: 2022-07-11 | End: 2022-08-08

## 2022-07-11 ASSESSMENT — ENCOUNTER SYMPTOMS
DIARRHEA: 0
STRIDOR: 0
WHEEZING: 0
CHOKING: 0
COLOR CHANGE: 0
ABDOMINAL PAIN: 0
SHORTNESS OF BREATH: 0
TROUBLE SWALLOWING: 0
COUGH: 0
BLOOD IN STOOL: 0
NAUSEA: 0
CONSTIPATION: 0
VOMITING: 0
EYE DISCHARGE: 0
ABDOMINAL DISTENTION: 0
SORE THROAT: 0
EYE ITCHING: 0

## 2022-07-11 NOTE — PATIENT INSTRUCTIONS
1.  Hypothyroid  Decrease synthroid to 200 mcg to 2 days a week   2. Thrombycytopenia; with enlarged spleen  Refer to hematology   3. TYPE II DM The current medical regimen is effective;  continue present plan and medications. 4.  Fatty liver  Not seen on us of liver; History of MILLER    5.   Weight gain of 15 pounds in a month ; monitor for now sweling in feet and legs  hctzz 12.5 daily and quick fu

## 2022-07-11 NOTE — PROGRESS NOTES
[facility] PHYSICIAN SERVICES  [facility] INTERNAL MEDICINE  [address] [address]  [facility] [place] [address]  Dept: [phone]  Dept Fax: [phone]: [phone]    Mauro Chavez (:  1945) is a 78 y.o. female,Established patient  with green, here for evaluation of the following chief complaint(s): 3 Month Follow-Up      Mauro Chavez is a 78 y.o. female who presents today for her medical conditions/complaints as noted below. Mauro Chavez is c/byron 3 Month Follow-Up        HPI:     Chief Complaint   Patient presents with    3 Month Follow-Up     HPI   1. Hypothyroid; She didn't come back for hte fu on TSh;  She is only taking 4 days a week   tsh is 0.161   We will need to decrease  2.  thrombocytopenia found on labs; and enlarged spleen; on the us of liver as we had sent her for GI referral   And this was seen    3. type II Dm; Fasting 128  a1c is 5.5  No hypoglycemia ; She hardly ever takes the metfromin  We will just stop for now   4. Fatty liver; No treatment  other than weight loss   With history but not mentioned on   Spleen is enlarged measures 17.9 x 5.2 x 14.8 cm with a total volume of 720.8 mL.  Splenic vein measures 0.9 cm.    5.  Weight gain of 15 pounds in a month after GI syndrome ;  Has no cAD diagnosis    Past Medical History:   Diagnosis Date    Diabetes mellitus ([address]     Hypertension     Non-alcoholic cirrhosis ([address]     Thyroid disease       Past Surgical History:   Procedure Laterality Date    CHOLECYSTECTOMY      COLONOSCOPY      normal, per pt    [facility]ST[facility]CTOMY ([facility])      SETH STEROTACTIC LOC BREAST BIOPSY RIGHT Right 2022    SETH STEROTACTIC LOC BREAST BIOPSY RIGHT 2022 [facility] [address]    OVARY REMOVAL Bilateral     AGE 50       Vitals 2022 2022 2022 3/28/2022 1/10/2735   SYSTOLIC 359 271 202 822 922   DIASTOLIC 60 70 60 92 83   Site - Left Upper Arm - - -   Pulse 86 86 90 87 99 Temp - - - - 97.8   Resp - - - - 17   SpO2 97 98 99 92 98   Weight 214 lb 212 lb 210 lb - 206 lb   Height 5' 5\" 5' 5\" 5' 5\" - 5' 5\"   Body mass index 35.61 kg/m2 35.27 kg/m2 34.94 kg/m2 - 34.28 kg/m2       Family History   Problem Relation Age of Onset    Heart Disease Mother     Breast Cancer Maternal Aunt 67    Breast Cancer Maternal Aunt 76    Colon Polyps Neg Hx     Colon Cancer Neg Hx        Social History     Tobacco Use    Smoking status: Never Smoker    Smokeless tobacco: Never Used   Substance Use Topics    Alcohol use: Never      Current Outpatient Medications   Medication Sig Dispense Refill    hydroCHLOROthiazide (MICROZIDE) 12.5 MG capsule Take 1 capsule by mouth daily 30 capsule 3    vitamin D (ERGOCALCIFEROL) 1.25 MG (20801 UT) CAPS capsule Take 1 capsule by mouth once a week 12 capsule 1    losartan-hydroCHLOROthiazide (HYZAAR) 50-12.5 MG per tablet Take 1 tablet by mouth daily      levothyroxine (SYNTHROID) 200 MCG tablet Take 200 mcg by mouth Daily      b complex vitamins capsule Take 1 capsule by mouth daily      traZODone (DESYREL) 50 MG tablet Take 50 mg by mouth nightly as needed for Sleep 1/2 tablet nightly as needed       No current facility-administered medications for this visit.      No Known Allergies    Health Maintenance   Topic Date Due    Annual Wellness Visit (AWV)  Never done    Diabetic retinal exam  Never done    DTaP/Tdap/Td vaccine (1 - Tdap) Never done    Colorectal Cancer Screen  Never done    COVID-19 Vaccine (3 - Booster for Pfizer series) 02/02/2022    Shingles vaccine (1 of 2) 04/11/2023 (Originally 5/10/1997)    Pneumococcal 65+ years Vaccine (1 - PCV) 04/15/2024 (Originally 5/10/1951)    Flu vaccine (1) 09/01/2022    Depression Screen  04/11/2023    Lipids  04/15/2023    Diabetic foot exam  07/11/2023    A1C test (Diabetic or Prediabetic)  07/11/2023    DEXA (modify frequency per FRAX score)  Completed    Hepatitis C screen  Completed    Hepatitis A vaccine  Aged Out    Hib vaccine  Aged Out    Meningococcal (ACWY) vaccine  Aged Out       Lab Results   Component Value Date    LABA1C 5.5 07/11/2022     No results found for: PSA, PSADIA  TSH   Date Value Ref Range Status   07/11/2022 0.161 (L) 0.270 - 4.200 uIU/mL Final   ]  Lab Results   Component Value Date     07/11/2022    K 4.1 07/11/2022     07/11/2022    CO2 25 07/11/2022    BUN 14 07/11/2022    CREATININE 0.8 07/11/2022    GLUCOSE 128 (H) 07/11/2022    CALCIUM 9.4 07/11/2022    PROT 6.9 07/11/2022    LABALBU 3.3 (L) 07/11/2022    BILITOT 1.2 07/11/2022    ALKPHOS 131 (H) 07/11/2022    AST 43 (H) 07/11/2022    ALT 19 07/11/2022    LABGLOM >60 07/11/2022    GFRAA >59 07/11/2022     Lab Results   Component Value Date    CHOL 170 04/15/2022     Lab Results   Component Value Date    TRIG 100 04/15/2022     Lab Results   Component Value Date    HDL 71 04/15/2022     Lab Results   Component Value Date    LDLCALC 79 04/15/2022     Lab Results   Component Value Date/Time     07/11/2022 08:28 AM    K 4.1 07/11/2022 08:28 AM     07/11/2022 08:28 AM    CO2 25 07/11/2022 08:28 AM    BUN 14 07/11/2022 08:28 AM    CREATININE 0.8 07/11/2022 08:28 AM    GLUCOSE 128 07/11/2022 08:28 AM    CALCIUM 9.4 07/11/2022 08:28 AM      Lab Results   Component Value Date    WBC 3.1 (L) 07/11/2022    HGB 11.1 (L) 07/11/2022    HCT 34.2 (L) 07/11/2022    MCV 94.7 07/11/2022    PLT 72 (L) 07/11/2022    LYMPHOPCT 49.0 (H) 07/11/2022    RBC 3.61 (L) 07/11/2022    MCH 30.7 07/11/2022    MCHC 32.5 (L) 07/11/2022    RDW 14.4 07/11/2022     Lab Results   Component Value Date    VITD25 39.6 07/11/2022     Labs reviewed from 7/11/2022    Subjective:      Review of Systems   Constitutional: Positive for unexpected weight change. Negative for fatigue and fever. HENT: Negative for ear discharge, ear pain, mouth sores, sore throat and trouble swallowing.     Eyes: Negative for discharge, itching and visual disturbance. Respiratory: Negative for cough, choking, shortness of breath, wheezing and stridor. Cardiovascular: Positive for leg swelling. Negative for chest pain and palpitations. Gastrointestinal: Negative for abdominal distention, abdominal pain, blood in stool, constipation, diarrhea, nausea and vomiting. Endocrine: Negative for cold intolerance, polydipsia and polyuria. Genitourinary: Negative for difficulty urinating, dysuria, frequency and urgency. Musculoskeletal: Negative for arthralgias and gait problem. Skin: Negative for color change and rash. Allergic/Immunologic: Negative for food allergies and immunocompromised state. Neurological: Negative for dizziness, tremors, syncope, speech difficulty, weakness and headaches. Hematological: Negative for adenopathy. Does not bruise/bleed easily. Psychiatric/Behavioral: Negative for confusion and hallucinations. Objective:     Physical Exam  Constitutional:       General: She is not in acute distress. Appearance: She is well-developed. HENT:      Head: Normocephalic and atraumatic. Eyes:      General: No scleral icterus. Right eye: No discharge. Left eye: No discharge. Pupils: Pupils are equal, round, and reactive to light. Neck:      Thyroid: No thyromegaly. Vascular: No JVD. Cardiovascular:      Rate and Rhythm: Normal rate and regular rhythm. Heart sounds: Normal heart sounds. No murmur heard. Pulmonary:      Effort: Pulmonary effort is normal. No respiratory distress. Breath sounds: Normal breath sounds. No wheezing or rales. Abdominal:      General: Bowel sounds are normal. There is no distension. Palpations: Abdomen is soft. There is no mass. Tenderness: There is no abdominal tenderness. There is no guarding or rebound. Musculoskeletal:         General: No tenderness. Normal range of motion. Cervical back: Normal range of motion and neck supple.    Skin: General: Skin is warm and dry. Findings: No erythema or rash. Neurological:      Mental Status: She is alert and oriented to person, place, and time. Cranial Nerves: No cranial nerve deficit. Coordination: Coordination normal.      Deep Tendon Reflexes: Reflexes are normal and symmetric. Reflexes normal.   Psychiatric:         Mood and Affect: Mood is not depressed. Behavior: Behavior normal.         Thought Content: Thought content normal.         Judgment: Judgment normal.     Visual inspection:  Deformity/amputation: absent  Skin lesions/pre-ulcerative calluses: present - both heels  Edema: right- 4+, left- 4+    Sensory exam:  Monofilament sensation: normal  (minimum of 5 random plantar locations tested, avoiding callused areas - > 1 area with absence of sensation is + for neuropathy)    Plus at least one of the following:  Pulses: normal,   Pinprick: Intact  Proprioception: Intact  Vibration (128 Hz): N/A   /60   Pulse 86   Ht 5' 5\" (1.651 m)   Wt 214 lb (97.1 kg)   SpO2 97%   BMI 35.61 kg/m²           Assessment:      Problem List     Controlled type 2 diabetes mellitus without complication, without long-term current use of insulin (HCC)     Rarely takes metformin A1c is 5.5 we will just stop it.           Relevant Orders     DIABETES FOOT EXAM (Completed)    Localized edema     hctz 12.5           Relevant Medications    losartan-hydroCHLOROthiazide (HYZAAR) 50-12.5 MG per tablet    hydroCHLOROthiazide (MICROZIDE) 12.5 MG capsule    MILLER (nonalcoholic steatohepatitis)     We had low evaluation GI no further intervention from them was reported to be necessary other than low-fat diet          Other specified hypothyroidism    Relevant Medications    levothyroxine (SYNTHROID) 200 MCG tablet    Other Relevant Orders    TSH    Primary hypertension - Primary    Thrombocytopenia ([facility]     Refer to hematology           Relevant Orders    Sree Rasmussen MD, Hematology/Oncology, [place]    Weight gain     Lets a small dose of HCTZ to the current 1. Plan:        Patient given educational materials - see patient instructions. Discussed use, benefit, and side effects of prescribed medications. Allpatient questions answered. Pt voiced understanding. Reviewed health maintenance. Instructed to continue current medications, diet and exercise. Patient agreed with treatment plan. Follow up as directed. MEDICATIONS:  Orders Placed This Encounter   Medications    hydroCHLOROthiazide (MICROZIDE) 12.5 MG capsule     Sig: Take 1 capsule by mouth daily     Dispense:  30 capsule     Refill:  3         ORDERS:  Orders Placed This Encounter   Procedures    TSH   Shaylee Sparks MD, Hematology/Oncology, [facility]  DIABETES FOOT EXAM       Follow-up:  Return in about 4 weeks (around 8/8/2022) for awv in 4 weeks  cancel the 8/29 OV and AWV  . PATIENT INSTRUCTIONS:  Patient Instructions   1. Hypothyroid  Decrease synthroid to 200 mcg to 2 days a week   2. Thrombycytopenia; with enlarged spleen  Refer to hematology   3. TYPE II DM The current medical regimen is effective;  continue present plan and medications. 4.  Fatty liver  Not seen on us of liver; History of MILLER    5. Weight gain of 15 pounds in a month ; monitor for now sweling in feet and legs  hctzz 12.5 daily and quick fu     Electronically signed by KATIA Henriquez on 7/11/2022 at 4:48 PM        EMRDragon/transcription disclaimer:  Much of this encounter note is electronic transcription/translation of spoken language to printed texts. The electronic translation of spoken language may be erroneous, or at times,nonsensical words or phrases may be inadvertently transcribed.   Although I have reviewed the note for such errors, some may still exist.

## 2022-07-11 NOTE — ASSESSMENT & PLAN NOTE
We had low evaluation GI no further intervention from them was reported to be necessary other than low-fat diet

## 2022-07-12 ENCOUNTER — APPOINTMENT (OUTPATIENT)
Dept: CT IMAGING | Age: 77
End: 2022-07-12
Payer: MEDICARE

## 2022-07-12 ENCOUNTER — HOSPITAL ENCOUNTER (EMERGENCY)
Age: 77
Discharge: HOME | End: 2022-07-12
Attending: EMERGENCY MEDICINE
Payer: MEDICARE

## 2022-07-12 VITALS
HEART RATE: 104 BPM | RESPIRATION RATE: 15 BRPM | SYSTOLIC BLOOD PRESSURE: 178 MMHG | DIASTOLIC BLOOD PRESSURE: 77 MMHG | OXYGEN SATURATION: 97 % | TEMPERATURE: 97.7 F

## 2022-07-12 DIAGNOSIS — S02.2XXA CLOSED FRACTURE OF NASAL BONE, INITIAL ENCOUNTER: ICD-10-CM

## 2022-07-12 DIAGNOSIS — S09.90XA CLOSED HEAD INJURY, INITIAL ENCOUNTER: Primary | ICD-10-CM

## 2022-07-12 PROCEDURE — 72125 CT NECK SPINE W/O DYE: CPT

## 2022-07-12 PROCEDURE — 99284 EMERGENCY DEPT VISIT MOD MDM: CPT

## 2022-07-12 PROCEDURE — 70450 CT HEAD/BRAIN W/O DYE: CPT

## 2022-07-12 PROCEDURE — 70486 CT MAXILLOFACIAL W/O DYE: CPT

## 2022-07-12 PROCEDURE — 72125 CT NECK SPINE W/O DYE: CPT | Performed by: RADIOLOGY

## 2022-07-12 PROCEDURE — 70450 CT HEAD/BRAIN W/O DYE: CPT | Performed by: RADIOLOGY

## 2022-07-12 PROCEDURE — 70486 CT MAXILLOFACIAL W/O DYE: CPT | Performed by: RADIOLOGY

## 2022-07-12 ASSESSMENT — ENCOUNTER SYMPTOMS
SHORTNESS OF BREATH: 0
VOMITING: 0
ABDOMINAL PAIN: 0
COUGH: 0
RHINORRHEA: 0
DIARRHEA: 0
NAUSEA: 0
SORE THROAT: 0
BACK PAIN: 0

## 2022-07-12 ASSESSMENT — PAIN - FUNCTIONAL ASSESSMENT: PAIN_FUNCTIONAL_ASSESSMENT: 0-10

## 2022-07-12 ASSESSMENT — PAIN SCALES - GENERAL: PAINLEVEL_OUTOF10: 1

## 2022-07-12 NOTE — ED PROVIDER NOTES
[address] [facility] EMERGENCY [facility]  eMERGENCY dEPARTMENT eNCOUnter      Pt Name: Qian Bowers  MRN: [id number]  [place] 1945  Date of evaluation: 7/12/2022  Provider: Juan Lai MD    [facility]       Chief Complaint   Patient presents with   Muniz Mandaeism Fall     pt presents post fall     Facial Laceration         HISTORY OF PRESENT ILLNESS   (Location/Symptom, Timing/Onset,Context/Setting, Quality, Duration, Modifying Factors, Severity)  Note limiting factors. Qian Bowers is a 78 y.o. female who presents to the emergency department after a fall. The patient was in [place] heading to [facility] and she excellently tripped over a curb causing her to fall and strike her face and nose on the ground. She denies losing consciousness. She has no complaint of any pain anywhere. She is not on anticoagulants. Tetanus is up-to-date. HPI    NursingNotes were reviewed. REVIEW OF SYSTEMS    (2-9 systems for level 4, 10 or more for level 5)     Review of Systems   Constitutional: Negative for chills and fever. HENT: Negative for rhinorrhea and sore throat. Respiratory: Negative for cough and shortness of breath. Cardiovascular: Negative for chest pain and leg swelling. Gastrointestinal: Negative for abdominal pain, diarrhea, nausea and vomiting. Genitourinary: Negative for dysuria and frequency. Musculoskeletal: Negative for back pain and neck pain. Neurological: Negative for dizziness, syncope and headaches. All other systems reviewed and are negative.            PAST MEDICALHISTORY     Past Medical History:   Diagnosis Date    Diabetes mellitus ([address]     Hypertension     Non-alcoholic cirrhosis ([address]     Thyroid disease          SURGICAL HISTORY       Past Surgical History:   Procedure Laterality Date    CHOLECYSTECTOMY  1978    COLONOSCOPY  2014    normal, per pt    HYSTERECTOMY (CERVIX STATUS UNKNOWN)  2000    [facility]ETH STEROTACTIC LOC BREAST BIOPSY RIGHT Right 04/27/2022    [facility] STEROTACTIC LOC BREAST BIOPSY RIGHT 4/27/2022 [facility] [address]    OVARY REMOVAL Bilateral 2000    AGE 55         CURRENT MEDICATIONS     Previous Medications    B COMPLEX VITAMINS CAPSULE    Take 1 capsule by mouth daily    HYDROCHLOROTHIAZIDE (MICROZIDE) 12.5 MG CAPSULE    Take 1 capsule by mouth daily    LEVOTHYROXINE (SYNTHROID) 200 MCG TABLET    Take 200 mcg by mouth Daily    LOSARTAN-HYDROCHLOROTHIAZIDE (HYZAAR) 50-12.5 MG PER TABLET    Take 1 tablet by mouth daily    TRAZODONE (DESYREL) 50 MG TABLET    Take 50 mg by mouth nightly as needed for Sleep 1/2 tablet nightly as needed    VITAMIN D (ERGOCALCIFEROL) 1.25 MG (94877 UT) CAPS CAPSULE    Take 1 capsule by mouth once a week       ALLERGIES     Patient has no known allergies. FAMILY HISTORY       Family History   Problem Relation Age of Onset    Heart Disease Mother     Breast Cancer Maternal Aunt 67    Breast Cancer Maternal Aunt 76    Colon Polyps Neg Hx     Colon Cancer Neg Hx           SOCIAL HISTORY       Social History     Socioeconomic History    Marital status:      Spouse name: None    Number of children: None    Years of education: None    Highest education level: None   Occupational History    None   Tobacco Use    Smoking status: Never Smoker    Smokeless tobacco: Never Used   Vaping Use    Vaping Use: Never used   Substance and Sexual Activity    Alcohol use: Never    Drug use: Never    Sexual activity: None   Other Topics Concern    None   Social History Narrative    None     Social Determinants of Health     Financial Resource Strain: Low Risk     Difficulty of Paying Living Expenses: Not hard at all   Food Insecurity: No Food Insecurity    Worried About Running Out of Food in the Last Year: Never true    [address] in the Last Year: Never true   Transportation Needs:     Lack of Transportation (Medical): Not on file    Lack of Transportation (Non-Medical):  Not on file   Physical Activity:     Days of Effort: No respiratory distress. Breath sounds: Normal breath sounds. No wheezing or rales. Abdominal:      Palpations: Abdomen is soft. There is no mass. Tenderness: There is no abdominal tenderness. Musculoskeletal:         General: Normal range of motion. Right lower leg: No edema. Left lower leg: No edema. Comments: No midline spine tenderness full range of motion of all 4 extremities without pain or deformity   Skin:     General: Skin is warm and dry. Neurological:      Mental Status: She is alert and oriented to person, place, and time. GCS: GCS eye subscore is 4. GCS verbal subscore is 5. GCS motor subscore is 6. DIAGNOSTIC RESULTS         RADIOLOGY:  Non-plain film images such as CT, Ultrasound and MRI are read by the radiologist. Plain radiographic images are visualized and preliminarily interpreted bythe emergency physician with the below findings:      [address]   Final Result       1. No acute intracranial abnormality. 2. Comminuted nasal bone fracture. Recommendation: Follow up as clinically indicated. All CT scans at this facility utilize dose modulation, iterative reconstruction, and/or weight based dosing when appropriate to reduce radiation dose to as low as reasonably achievable. Electronically Signed by Salma Colin MD at 12-Jul-2022 03:52:30 PM               CT FACIAL BONES WO CONTRAST   Final Result   1. Acute comminuted nasal bone fracture. Recommendation:   Follow up as clinically indicated. All CT scans at this facility utilize dose modulation, iterative reconstruction, and/or weight based dosing when appropriate to reduce radiation dose to as low as reasonably achievable. Electronically Signed by Salma Colin MD at 12-Jul-2022 03:41:44 PM               CT CERVICAL SPINE WO CONTRAST   Final Result   1. Straightened cervical lordosis. 2.  Multiple minimal anterior 2 mm subluxations noted at C3-4, C5-6 and C7-T1. Hyacinth Elizondo MD  07/12/22 0757

## 2022-07-21 ENCOUNTER — OFFICE VISIT (OUTPATIENT)
Dept: ENT CLINIC | Age: 77
End: 2022-07-21
Payer: MEDICARE

## 2022-07-21 VITALS
SYSTOLIC BLOOD PRESSURE: 128 MMHG | DIASTOLIC BLOOD PRESSURE: 70 MMHG | BODY MASS INDEX: 35.65 KG/M2 | HEIGHT: 65 IN | WEIGHT: 214 LBS

## 2022-07-21 DIAGNOSIS — S02.2XXA CLOSED FRACTURE OF NASAL BONE, INITIAL ENCOUNTER: Primary | ICD-10-CM

## 2022-07-21 PROCEDURE — 3017F COLORECTAL CA SCREEN DOC REV: CPT | Performed by: PHYSICIAN ASSISTANT

## 2022-07-21 PROCEDURE — G8417 CALC BMI ABV UP PARAM F/U: HCPCS | Performed by: PHYSICIAN ASSISTANT

## 2022-07-21 PROCEDURE — 99203 OFFICE O/P NEW LOW 30 MIN: CPT | Performed by: PHYSICIAN ASSISTANT

## 2022-07-21 PROCEDURE — 1090F PRES/ABSN URINE INCON ASSESS: CPT | Performed by: PHYSICIAN ASSISTANT

## 2022-07-21 PROCEDURE — G8427 DOCREV CUR MEDS BY ELIG CLIN: HCPCS | Performed by: PHYSICIAN ASSISTANT

## 2022-07-21 PROCEDURE — 1036F TOBACCO NON-USER: CPT | Performed by: PHYSICIAN ASSISTANT

## 2022-07-21 PROCEDURE — 1123F ACP DISCUSS/DSCN MKR DOCD: CPT | Performed by: PHYSICIAN ASSISTANT

## 2022-07-21 PROCEDURE — G8399 PT W/DXA RESULTS DOCUMENT: HCPCS | Performed by: PHYSICIAN ASSISTANT

## 2022-07-21 ASSESSMENT — ENCOUNTER SYMPTOMS
SINUS PAIN: 0
VOICE CHANGE: 0
SORE THROAT: 0
TROUBLE SWALLOWING: 0
EYE DISCHARGE: 0
RHINORRHEA: 0
EYE PAIN: 0
SINUS PRESSURE: 0
FACIAL SWELLING: 0

## 2022-07-21 NOTE — ASSESSMENT & PLAN NOTE
Comminuted nasal bone-at tip- currently with no obstruction  Plan: Patient is to follow-up in the clinic in 1 month for reevaluation. She was advised to call if she notices worsening nasal breathing or worsening pain.

## 2022-07-21 NOTE — PROGRESS NOTES
[facility] OTOLARYNGOLOGY/ENT  Ms. Yaritza Hunter is a pleasant 53-year-old  female that was referred by the emergency room department due to recent findings of a nasal fracture. Patient reports that she accidentally fell on a curb and landed on a concrete sidewalk. She denied loss of consciousness. She was formally worked up where she was found to have a comminuted nasal fracture towards the tip. Currently she reports that she is able to breathe through her nose with no issues and denies any issues with bleeding or any worsening pain. Patient admits to having a prior fracture from a previous fall and denies any surgical intervention. Allergies: Patient has no known allergies. Current Outpatient Medications   Medication Sig Dispense Refill    hydroCHLOROthiazide (MICROZIDE) 12.5 MG capsule Take 1 capsule by mouth daily 30 capsule 3    vitamin D (ERGOCALCIFEROL) 1.25 MG (60569 UT) CAPS capsule Take 1 capsule by mouth once a week 12 capsule 1    losartan-hydroCHLOROthiazide (HYZAAR) 50-12.5 MG per tablet Take 1 tablet by mouth daily      levothyroxine (SYNTHROID) 200 MCG tablet Take 200 mcg by mouth Daily      b complex vitamins capsule Take 1 capsule by mouth daily      traZODone (DESYREL) 50 MG tablet Take 50 mg by mouth nightly as needed for Sleep 1/2 tablet nightly as needed       No current facility-administered medications for this visit.        Past Surgical History:   Procedure Laterality Date    CHOLECYSTECTOMY  1978    COLONOSCOPY  2014    normal, per pt    HYSTERECTOMY (CERVIX STATUS UNKNOWN)  2000    [facility] STEROTACTIC LOC BREAST BIOPSY RIGHT Right 04/27/2022    [facility] STEROTACTIC LOC BREAST BIOPSY RIGHT 4/27/2022 [facility] [address]    OVARY REMOVAL Bilateral 2000    AGE 50       Past Medical History:   Diagnosis Date    Diabetes mellitus ([address]     Hypertension     Non-alcoholic cirrhosis ([address]     Thyroid disease        Family History   Problem Relation Age of Onset    Heart Disease Mother Breast Cancer Maternal Aunt 67    Breast Cancer Maternal Aunt 74    Colon Polyps Neg Hx     Colon Cancer Neg Hx        Social History     Tobacco Use    Smoking status: Never    Smokeless tobacco: Never   Substance Use Topics    Alcohol use: Never           REVIEW OF SYSTEMS:  all other systems reviewed and are negative  Review of Systems   Constitutional:  Negative for chills and fever. HENT:  Negative for congestion, dental problem, ear discharge, ear pain, facial swelling, hearing loss, nosebleeds, postnasal drip, rhinorrhea, sinus pressure, sinus pain, sneezing, sore throat, tinnitus, trouble swallowing and voice change. Eyes:  Negative for pain and discharge. Neurological:  Negative for dizziness and headaches. Comments:     PHYSICAL EXAM:    /70   Ht 5' 5\" (1.651 m)   Wt 214 lb (97.1 kg)   BMI 35.61 kg/m²   Body mass index is 35.61 kg/m². General Appearance: well developed  and well nourished  Head/ Face: normocephalic and atraumatic  Vocal Quality: good/ normal  Ears: Right Ear: External: external ears normal Otoscopy Ear Canal: canal clear Otoscopy TM: TM's normal and TM's mobile Left Ear: External: external ears normal Otoscopy Ear Canal: canal clear Otoscopy TM: TM's normal and TM's mobile  Hearing: grossly intact  Nose: The patient was noted with a deviated septum to the right with no evidence of a septal hematoma. Patient is able to breathe with no obstruction. Palpation over the bridge of the nose demonstrates no tenderness or crepitus. Neck: supple and adenopathy none palpable  Thyroid: normal and nodules No    Assessment & Plan:    Problem List Items Addressed This Visit       Closed fracture of nasal bones - Primary     Comminuted nasal bone-at tip- currently with no obstruction  Plan: Patient is to follow-up in the clinic in 1 month for reevaluation. She was advised to call if she notices worsening nasal breathing or worsening pain.             No orders of the defined types were placed in this encounter. No orders of the defined types were placed in this encounter. Electronically signed by Janette Johnson PA-C on 7/21/22 at 4:59 PM CDT        Please note that this chart was generated using dragon dictation software. Although every effort was made to ensure the accuracy of this automated transcription, some errors in transcription may have occurred.

## 2022-08-02 DIAGNOSIS — E03.8 OTHER SPECIFIED HYPOTHYROIDISM: ICD-10-CM

## 2022-08-02 LAB — TSH SERPL DL<=0.05 MIU/L-ACNC: 32.26 UIU/ML (ref 0.27–4.2)

## 2022-08-03 DIAGNOSIS — E03.8 OTHER SPECIFIED HYPOTHYROIDISM: Primary | ICD-10-CM

## 2022-08-04 ENCOUNTER — HOSPITAL ENCOUNTER (OUTPATIENT)
Dept: INFUSION THERAPY | Age: 77
Discharge: HOME | End: 2022-08-04
Payer: MEDICARE

## 2022-08-04 ENCOUNTER — OFFICE VISIT (OUTPATIENT)
Dept: HEMATOLOGY | Age: 77
End: 2022-08-04
Payer: MEDICARE

## 2022-08-04 VITALS
OXYGEN SATURATION: 99 % | DIASTOLIC BLOOD PRESSURE: 80 MMHG | SYSTOLIC BLOOD PRESSURE: 158 MMHG | WEIGHT: 219 LBS | BODY MASS INDEX: 36.49 KG/M2 | HEART RATE: 90 BPM | HEIGHT: 65 IN

## 2022-08-04 DIAGNOSIS — D69.6 THROMBOCYTOPENIA (HCC): Primary | ICD-10-CM

## 2022-08-04 DIAGNOSIS — D69.6 THROMBOCYTOPENIA (HCC): ICD-10-CM

## 2022-08-04 DIAGNOSIS — D61.818 PANCYTOPENIA (HCC): ICD-10-CM

## 2022-08-04 DIAGNOSIS — K75.81 NASH (NONALCOHOLIC STEATOHEPATITIS): ICD-10-CM

## 2022-08-04 DIAGNOSIS — R53.83 FATIGUE, UNSPECIFIED TYPE: ICD-10-CM

## 2022-08-04 DIAGNOSIS — R16.1 SPLENOMEGALY: ICD-10-CM

## 2022-08-04 LAB
BASOPHILS ABSOLUTE: 0.02 K/UL (ref 0.01–0.08)
BASOPHILS RELATIVE PERCENT: 0.6 % (ref 0.1–1.2)
EOSINOPHILS ABSOLUTE: 0.09 K/UL (ref 0.04–0.54)
EOSINOPHILS RELATIVE PERCENT: 2.5 % (ref 0.7–7)
FOLATE: 11.6 NG/ML (ref 4.8–37.3)
HCT VFR BLD CALC: 36.7 % (ref 34.1–44.9)
HEMOGLOBIN: 11.9 G/DL (ref 11.2–15.7)
HIV-1 P24 AG: NORMAL
LACTATE DEHYDROGENASE: 259 U/L (ref 313–618)
LYMPHOCYTES ABSOLUTE: 1.22 K/UL (ref 1.18–3.74)
LYMPHOCYTES RELATIVE PERCENT: 33.6 % (ref 19.3–53.1)
MCH RBC QN AUTO: 30.8 PG (ref 25.6–32.2)
MCHC RBC AUTO-ENTMCNC: 32.4 G/DL (ref 32.3–35.5)
MCV RBC AUTO: 95.1 FL (ref 79.4–94.8)
MONOCYTES ABSOLUTE: 0.2 K/UL (ref 0.24–0.82)
MONOCYTES RELATIVE PERCENT: 5.5 % (ref 4.7–12.5)
NEUTROPHILS ABSOLUTE: 2.09 K/UL (ref 1.56–6.13)
NEUTROPHILS RELATIVE PERCENT: 57.5 % (ref 34–71.1)
PDW BLD-RTO: 14.8 % (ref 11.7–14.4)
PLATELET # BLD: 56 K/UL (ref 182–369)
PMV BLD AUTO: 10.6 FL (ref 7.4–10.4)
RAPID HIV 1&2: NORMAL
RBC # BLD: 3.86 M/UL (ref 3.93–5.22)
WBC # BLD: 3.63 K/UL (ref 3.98–10.04)

## 2022-08-04 PROCEDURE — 1123F ACP DISCUSS/DSCN MKR DOCD: CPT | Performed by: NURSE PRACTITIONER

## 2022-08-04 PROCEDURE — 1036F TOBACCO NON-USER: CPT | Performed by: NURSE PRACTITIONER

## 2022-08-04 PROCEDURE — G8399 PT W/DXA RESULTS DOCUMENT: HCPCS | Performed by: NURSE PRACTITIONER

## 2022-08-04 PROCEDURE — G8417 CALC BMI ABV UP PARAM F/U: HCPCS | Performed by: NURSE PRACTITIONER

## 2022-08-04 PROCEDURE — 99214 OFFICE O/P EST MOD 30 MIN: CPT | Performed by: NURSE PRACTITIONER

## 2022-08-04 PROCEDURE — 99212 OFFICE O/P EST SF 10 MIN: CPT

## 2022-08-04 PROCEDURE — 36415 COLL VENOUS BLD VENIPUNCTURE: CPT

## 2022-08-04 PROCEDURE — 3017F COLORECTAL CA SCREEN DOC REV: CPT | Performed by: NURSE PRACTITIONER

## 2022-08-04 PROCEDURE — G8427 DOCREV CUR MEDS BY ELIG CLIN: HCPCS | Performed by: NURSE PRACTITIONER

## 2022-08-04 PROCEDURE — 1090F PRES/ABSN URINE INCON ASSESS: CPT | Performed by: NURSE PRACTITIONER

## 2022-08-04 PROCEDURE — 85025 COMPLETE CBC W/AUTO DIFF WBC: CPT

## 2022-08-04 ASSESSMENT — ENCOUNTER SYMPTOMS
EYE DISCHARGE: 0
TROUBLE SWALLOWING: 0
WHEEZING: 0
CONSTIPATION: 1
ABDOMINAL PAIN: 0
NAUSEA: 0
COUGH: 0
DIARRHEA: 0
VOMITING: 0
SORE THROAT: 0
SHORTNESS OF BREATH: 0
EYE ITCHING: 0

## 2022-08-04 NOTE — PROGRESS NOTES
OP HEMATOLOGY/ONCOLOGY CONSULTATION      Pt Name: Qian Bowers  Birthdate: [birth year]  MRN: [id number]  Referring provider: KATIA Preston  Requesting provider: KATIA Pop  Reason for consultation: Thrombocytopenia  Date of evaluation: 8/4/2022    History Obtained From:  patient, electronic medical record    CHIEF COMPLAINT:    Chief Complaint   Patient presents with    New Patient     thrombocytopenia     HISTORY OF PRESENT ILLNESS:    Memo Hernandez \"Helen\" Mariah Reyna is a 78 y.o.  female referred KATIA Pop for evaluation of thrombocytopenia. Hematology consult is performed 8/4/2022. PMH significant for T2DM, HTN, hypothyroidism, insomnia, MILLER    Labs 4/15/2022 by PCP:  CBC: WBC 5.3, Hgb 11.6/MCV 92, platelets 97,909  CMP: Total bilirubin 1.4, alkaline phosphatase 136, AST 41  Hepatitis C Ab: Nonreactive  TSH: 0.161  Vitamin D: 39.6  HgbA1c: 5.5%    Helen has a documented history of MILLER in 2018. She states she was previously evaluated by gastroenterologist, Dr. Augusta Hale in [place], [place]. She was told she has a fatty liver, diet/exercise recommended. Helen was referred by her PCP to [place] [address] gastroenterology, evaluated by KATIA Harris on 5/13/2022. Liver ultrasound 6/21/2022 at [facility] documented preserved echogenicity of the liver, without evidence of discrete hepatic mass and dilated CBD post a cholecystectomy. The spleen was noted to be enlarged at 17.9 x 5.2 x 14.8 cm with a volume of 720.8 mL. Review of CBCs:    Platelets were [phone],796 on 10/30/2021 per CBC from [facility] in [place], [place]    CBC 8/4/2022 (hematology consultation): WBC 3.63, Hgb 11.9/MCV 95.1, platelets 11,555. Normal differentials. Helen denies NSAID use. She is a nondrinker. She bruises easily, though denies overt bleeding.     Past Medical History:   Diagnosis Date    Anxiety     Bruising     Diabetes mellitus ([facility]     Edema     History of cellulitis     History of constipation medication induced    Hypertension     Non-alcoholic cirrhosis ([facility]     Sleep disorder     Splenomegaly     Thyroid disease      Past Surgical History:   Procedure Laterality Date    CHOLECYSTECTOMY  1978    COLONOSCOPY  2014    normal, per pt    HYSTERECTOMY (CERVIX STATUS UNKNOWN)  2000    [facility] STEROTACTIC LOC BREAST BIOPSY RIGHT Right 04/27/2022    [facility] STEROTACTIC LOC BREAST BIOPSY RIGHT 4/27/2022 [facility] [address]    OVARY REMOVAL Bilateral 2000    AGE 50       Current Outpatient Medications:     losartan-hydroCHLOROthiazide (HYZAAR) 50-12.5 MG per tablet, Take 1 tablet by mouth daily, Disp: , Rfl:     levothyroxine (SYNTHROID) 200 MCG tablet, Take 200 mcg by mouth Daily, Disp: , Rfl:     b complex vitamins capsule, Take 1 capsule by mouth daily, Disp: , Rfl:     traZODone (DESYREL) 50 MG tablet, Take 50 mg by mouth nightly as needed for Sleep 1/2 tablet nightly as needed, Disp: , Rfl:     hydroCHLOROthiazide (MICROZIDE) 12.5 MG capsule, Take 1 capsule by mouth daily (Patient not taking: Reported on 8/4/2022), Disp: 30 capsule, Rfl: 3    vitamin D (ERGOCALCIFEROL) 1.25 MG (84220 UT) CAPS capsule, Take 1 capsule by mouth once a week, Disp: 12 capsule, Rfl: 1   Allergies: No Known Allergies  Social History     Tobacco Use    Smoking status: Never    Smokeless tobacco: Never   Vaping Use    Vaping Use: Never used   Substance Use Topics    Alcohol use: Never    Drug use: Never     Family History   Problem Relation Age of Onset    Heart Disease Mother     Breast Cancer Maternal Aunt 67    Breast Cancer Maternal Aunt 74    Stomach Cancer Maternal Uncle 72        Great Uncle    Colon Polyps Neg Hx     Colon Cancer Neg Hx      Health Maintenance   Topic Date Due    Annual Wellness Visit (AWV)  Never done    Diabetic retinal exam  Never done    DTaP/Tdap/Td vaccine (1 - Tdap) Never done    Colorectal Cancer Screen  Never done    COVID-19 Vaccine (3 - Booster for Pfizer series) 02/02/2022    Shingles vaccine (1 of 2) 04/11/2023 (Originally 5/10/1997)    Pneumococcal 65+ years Vaccine (1 - PCV) 04/15/2024 (Originally 5/10/1951)    Flu vaccine (1) 09/01/2022    Depression Screen  04/11/2023    Lipids  04/15/2023    Diabetic foot exam  07/11/2023    A1C test (Diabetic or Prediabetic)  07/11/2023    DEXA (modify frequency per FRAX score)  Completed    Hepatitis C screen  Completed    Hepatitis A vaccine  Aged Out    Hib vaccine  Aged Out    Meningococcal (ACWY) vaccine  Aged Out     Subjective   Review of Systems   Constitutional:  Positive for fatigue and unexpected weight change (gain). Negative for fever. HENT:  Negative for dental problem, hearing loss, mouth sores, nosebleeds, sore throat and trouble swallowing. History of broken nose twice in the last year   Eyes:  Negative for discharge and itching. Respiratory:  Negative for cough, shortness of breath and wheezing. Cardiovascular:  Positive for leg swelling. Negative for chest pain and palpitations. Gastrointestinal:  Positive for constipation. Negative for abdominal pain, diarrhea, nausea and vomiting. Endocrine: Positive for cold intolerance. Negative for heat intolerance. Genitourinary:  Negative for dysuria, frequency, hematuria and urgency. Musculoskeletal:  Negative for arthralgias, joint swelling and myalgias. Skin:  Negative for pallor and rash. Allergic/Immunologic: Negative for environmental allergies and immunocompromised state. Neurological:  Negative for seizures, syncope and numbness. Balance issues    Hematological:  Negative for adenopathy. Bruises/bleeds easily. Psychiatric/Behavioral:  Negative for agitation, behavioral problems and confusion. History of anxiety/depression   Objective   Physical Exam  Vitals reviewed. Constitutional:       General: She is not in acute distress. Appearance: She is well-developed. She is not toxic-appearing or diaphoretic. Comments: Wearing a facial mask.    HENT: Head: Normocephalic and atraumatic. Right Ear: External ear normal.      Left Ear: External ear normal.      Nose: Nose normal.      Mouth/Throat:      Mouth: Mucous membranes are moist.   Eyes:      General: No scleral icterus. Right eye: No discharge. Left eye: No discharge. Conjunctiva/sclera: Conjunctivae normal.   Neck:      Trachea: No tracheal deviation. Cardiovascular:      Rate and Rhythm: Normal rate and regular rhythm. Pulmonary:      Effort: Pulmonary effort is normal. No respiratory distress. Breath sounds: Normal breath sounds. No wheezing or rales. Chest:   Breasts:     Right: No supraclavicular adenopathy. Left: No supraclavicular adenopathy. Abdominal:      General: Bowel sounds are normal. There is no distension. Palpations: Abdomen is soft. Tenderness: There is no abdominal tenderness. There is no guarding. Genitourinary:     Comments: Exam deferred  Musculoskeletal:         General: No tenderness or deformity. Cervical back: Neck supple. No muscular tenderness. Right lower leg: Edema (2+) present. Left lower leg: Edema (2+) present. Comments: Normal ROM all four extremities   Lymphadenopathy:      Cervical:      Right cervical: No superficial or deep cervical adenopathy. Left cervical: No superficial or deep cervical adenopathy. Upper Body:      Right upper body: No supraclavicular adenopathy. Left upper body: No supraclavicular adenopathy. Comments:      Skin:     General: Skin is warm and dry. Findings: Bruising present. No rash. Neurological:      Mental Status: She is alert and oriented to person, place, and time. Comments: follows commands, non-focal   Psychiatric:         Behavior: Behavior normal. Behavior is cooperative. Thought Content: Thought content normal.         Judgment: Judgment normal.      Comments: Alert and oriented to person, place and time.      BP (!) 158/80 Comment: Pt. reports takes BP med middle of day  Pulse 90   Ht 5' 5\" (1.651 m)   Wt 219 lb (99.3 kg)   SpO2 99%   BMI 36.44 kg/m²   Wt Readings from Last 3 Encounters:   08/04/22 219 lb (99.3 kg)   07/21/22 214 lb (97.1 kg)   07/11/22 214 lb (97.1 kg)     Labs reviewed by me:  CBC:   Lab Results   Component Value Date    WBC 3.63 (L) 08/04/2022    HGB 11.9 08/04/2022    HCT 36.7 08/04/2022    MCV 95.1 (H) 08/04/2022    PLT 56 (L) 08/04/2022     ASSESSMENT/PLAN:    Fide Saldivar was seen today for new patient. Diagnoses and all orders for this visit:    Pancytopenia (HCC)/Thrombocytopenia related to splenomegaly  -     Path Review, Smear; Future  -     Liver Fibrosis, Chronic Viral Hepatits (Fibrosure); Future  -     HIV Rapid 1&2; Future  -     Folate; Future  -     Lactate Dehydrogenase; Future  -     Cancel: Miscellaneous Sendout; Future    MILLER (nonalcoholic steatohepatitis)  -     Liver Fibrosis, Chronic Viral Hepatits (Fibrosure); Future  - Evaluated by [facility]    Fatigue, unspecified type  -     HIV Rapid 1&2; Future    Splenomegaly  -     Path Review, Smear; Future  -     Liver Fibrosis, Chronic Viral Hepatits (Fibrosure); Future  -     HIV Rapid 1&2; Future  -     Folate; Future  -     Lactate Dehydrogenase; Future  -     Miscellaneous Sendout; Future - FLOW cytometry     Pancytopenia felt related to splenomegaly. Potential causes of splenomegaly discussed. Will request prior records from Dr. Kenisha Staples in [place], [place] -diagnosed with MILLER 2018  Additional serology requested as noted below  If serology unrevealing, discussed need for bone marrow aspirate and biopsy  Consider JAK2 with reflexes due to splenomegaly, if serology unrevealing. Patient denies NSAID use. Reiterated to avoid. Tumor Screening:  Colonoscopy 2014; 10 yr recall per previous progress note from KATIA Villagomez  Mammogram 4/14/2022 at [facility]: BI-RADS category 0.  Calcifications right breast mid depth 3:00 position  Mammogram Right breast 4/15/2022 at [facility]: BI-RADS category 4  Right Breast Biopsy 4/27/2022 at [facility] by Dr. Saranya Arango: findings are benign. Recall recommended in 6 months    Return in about 2 weeks (around 8/18/2022) for follow up with KATIA Carter. I have seen, examined and reviewed this patient medication list, appropriate labs and imaging studies. I reviewed relevant medical records and others physicians notes. I discussed the plan of care with the patient. I answered all questions to the patients satisfaction. I have also reviewed the chief complaint (CC) and part of the history (History of Present Illness (HPI), Past Family Social History [facility]), or Review of Systems (ROS) and made changes when appropriated. Office note and labs completed by KATIA Beckman and KATIA Wetzel reviewed. Dictated utilizing Dragon transcription software.         KATIA Haider  4:24 PM  8/4/2022

## 2022-08-06 LAB
ALPHA 2-MACROGLOBULINS, QN: 249 MG/DL (ref 110–276)
ALT SERPL-CCNC: 21 IU/L (ref 0–40)
BILIRUB SERPL-MCNC: 0.8 MG/DL (ref 0–1.2)
COMMENT: ABNORMAL
FIBROSIS SCORE: ABNORMAL
FIBROSIS STAGE: ABNORMAL
GGT: 62 IU/L (ref 0–60)
HAPTOGLOBIN: <10 MG/DL (ref 42–346)
INTERPRETATIONS:: ABNORMAL
LIMITATIONS:: ABNORMAL
LIPOPROTEIN (A): 152 MG/DL (ref 116–209)
NECROINFLAMM ACTIVITY SCORING:: ABNORMAL
NECROINFLAMMAT ACTIVITY GRADE: ABNORMAL
NECROINFLAMMAT ACTIVITY SCORE: 0.18 (ref 0–0.17)

## 2022-08-07 LAB
INTERPRETATION: NORMAL
NUMBER OF MARKERS: 30 MARKERS
SOURCE: NORMAL

## 2022-08-08 ENCOUNTER — OFFICE VISIT (OUTPATIENT)
Dept: INTERNAL MEDICINE | Age: 77
End: 2022-08-08
Payer: MEDICARE

## 2022-08-08 VITALS
DIASTOLIC BLOOD PRESSURE: 64 MMHG | OXYGEN SATURATION: 99 % | HEIGHT: 65 IN | BODY MASS INDEX: 35.82 KG/M2 | SYSTOLIC BLOOD PRESSURE: 132 MMHG | HEART RATE: 80 BPM | WEIGHT: 215 LBS

## 2022-08-08 DIAGNOSIS — R60.0 LOCALIZED EDEMA: ICD-10-CM

## 2022-08-08 DIAGNOSIS — D69.6 THROMBOCYTOPENIA (HCC): ICD-10-CM

## 2022-08-08 DIAGNOSIS — K75.81 NASH (NONALCOHOLIC STEATOHEPATITIS): ICD-10-CM

## 2022-08-08 DIAGNOSIS — E66.01 SEVERE OBESITY (BMI 35.0-39.9) WITH COMORBIDITY (HCC): ICD-10-CM

## 2022-08-08 DIAGNOSIS — F51.01 PRIMARY INSOMNIA: ICD-10-CM

## 2022-08-08 DIAGNOSIS — K59.01 SLOW TRANSIT CONSTIPATION: ICD-10-CM

## 2022-08-08 DIAGNOSIS — E55.9 VITAMIN D DEFICIENCY: ICD-10-CM

## 2022-08-08 DIAGNOSIS — I10 PRIMARY HYPERTENSION: ICD-10-CM

## 2022-08-08 DIAGNOSIS — E11.9 CONTROLLED TYPE 2 DIABETES MELLITUS WITHOUT COMPLICATION, WITHOUT LONG-TERM CURRENT USE OF INSULIN (HCC): Primary | ICD-10-CM

## 2022-08-08 DIAGNOSIS — E03.8 OTHER SPECIFIED HYPOTHYROIDISM: ICD-10-CM

## 2022-08-08 DIAGNOSIS — Z00.00 INITIAL MEDICARE ANNUAL WELLNESS VISIT: ICD-10-CM

## 2022-08-08 LAB — BLOOD SMEAR REVIEW: NORMAL

## 2022-08-08 PROCEDURE — 3017F COLORECTAL CA SCREEN DOC REV: CPT | Performed by: NURSE PRACTITIONER

## 2022-08-08 PROCEDURE — 1090F PRES/ABSN URINE INCON ASSESS: CPT | Performed by: NURSE PRACTITIONER

## 2022-08-08 PROCEDURE — G0438 PPPS, INITIAL VISIT: HCPCS | Performed by: NURSE PRACTITIONER

## 2022-08-08 PROCEDURE — 3044F HG A1C LEVEL LT 7.0%: CPT | Performed by: NURSE PRACTITIONER

## 2022-08-08 PROCEDURE — G8399 PT W/DXA RESULTS DOCUMENT: HCPCS | Performed by: NURSE PRACTITIONER

## 2022-08-08 PROCEDURE — G8427 DOCREV CUR MEDS BY ELIG CLIN: HCPCS | Performed by: NURSE PRACTITIONER

## 2022-08-08 PROCEDURE — 99214 OFFICE O/P EST MOD 30 MIN: CPT | Performed by: NURSE PRACTITIONER

## 2022-08-08 PROCEDURE — 2022F DILAT RTA XM EVC RTNOPTHY: CPT | Performed by: NURSE PRACTITIONER

## 2022-08-08 PROCEDURE — G8417 CALC BMI ABV UP PARAM F/U: HCPCS | Performed by: NURSE PRACTITIONER

## 2022-08-08 PROCEDURE — 1036F TOBACCO NON-USER: CPT | Performed by: NURSE PRACTITIONER

## 2022-08-08 PROCEDURE — 1123F ACP DISCUSS/DSCN MKR DOCD: CPT | Performed by: NURSE PRACTITIONER

## 2022-08-08 RX ORDER — BUMETANIDE 0.5 MG/1
0.5 TABLET ORAL DAILY
Qty: 90 TABLET | Refills: 1 | Status: SHIPPED | OUTPATIENT
Start: 2022-08-08

## 2022-08-08 ASSESSMENT — ENCOUNTER SYMPTOMS
STRIDOR: 0
EYE ITCHING: 0
NAUSEA: 0
TROUBLE SWALLOWING: 0
COUGH: 0
SHORTNESS OF BREATH: 0
WHEEZING: 0
CONSTIPATION: 1
COLOR CHANGE: 0
EYE DISCHARGE: 0
ABDOMINAL PAIN: 0
SORE THROAT: 0
DIARRHEA: 0
VOMITING: 0
BLOOD IN STOOL: 0
ABDOMINAL DISTENTION: 0
CHOKING: 0

## 2022-08-08 ASSESSMENT — PATIENT HEALTH QUESTIONNAIRE - PHQ9
SUM OF ALL RESPONSES TO PHQ QUESTIONS 1-9: 0
SUM OF ALL RESPONSES TO PHQ9 QUESTIONS 1 & 2: 0
SUM OF ALL RESPONSES TO PHQ QUESTIONS 1-9: 0
1. LITTLE INTEREST OR PLEASURE IN DOING THINGS: 0
2. FEELING DOWN, DEPRESSED OR HOPELESS: 0

## 2022-08-08 ASSESSMENT — LIFESTYLE VARIABLES: HOW OFTEN DO YOU HAVE A DRINK CONTAINING ALCOHOL: NEVER

## 2022-08-08 NOTE — PATIENT INSTRUCTIONS
Thrombocytopenia;  keep fu with Princess Jorge Reese DM; The current medical regimen is effective;  continue present plan and medications. Edema of feet and legs;  bumex 0.5 mg daily   MILLER: followed by Urban Gray  Hypothyroidism;  4 weeks  get repeat thyroid TSH     Constipation;  maybe clean yourself out with a couple of bottles of mag citrate, then start taking 1 capful of miralax daily ro prevent constipation   Personalized Preventive Plan for Simeon Carson - 8/8/2022  Medicare offers a range of preventive health benefits. Some of the tests and screenings are paid in full while other may be subject to a deductible, co-insurance, and/or copay. Some of these benefits include a comprehensive review of your medical history including lifestyle, illnesses that may run in your family, and various assessments and screenings as appropriate. After reviewing your medical record and screening and assessments performed today your provider may have ordered immunizations, labs, imaging, and/or referrals for you. A list of these orders (if applicable) as well as your Preventive Care list are included within your After Visit Summary for your review. Other Preventive Recommendations:    A preventive eye exam performed by an eye specialist is recommended every 1-2 years to screen for glaucoma; cataracts, macular degeneration, and other eye disorders. A preventive dental visit is recommended every 6 months. Try to get at least 150 minutes of exercise per week or 10,000 steps per day on a pedometer . Order or download the FREE \"Exercise & Physical Activity: Your Everyday Guide\" from [employer]. Call 7-778.162.9601 or search [employer] online. You need 3553-2236 mg of calcium and 7817-3902 IU of vitamin D per day.  It is possible to meet your calcium requirement with diet alone, but a vitamin D supplement is usually necessary to meet this goal.  When exposed to the sun, use a sunscreen that protects against both UVA and UVB radiation with an SPF of 30 or greater. Reapply every 2 to 3 hours or after sweating, drying off with a towel, or swimming. Always wear a seat belt when traveling in a car. Always wear a helmet when riding a bicycle or motorcycle.

## 2022-08-08 NOTE — PROGRESS NOTES
[facility] PHYSICIAN SERVICES  [facility] INTERNAL MEDICINE  [address] [facility] [address] [address]  Dept: [phone]  Dept Fax: [phone]: [phone]    Jaimie Liu (:  1945) is a 78 y.o. female,Established patient  with green, here for evaluation of the following chief complaint(s): Medicare AWV      Jaimie Liu is a 78 y.o. female who presents today for her medical conditions/complaints as noted below. Jaimie Liu is c/byron Medicare AWV        HPI:     Chief Complaint   Patient presents with    Medicare AWV     HPI     Thrombocytopenia;  seeing Jatin Ram;  she thought some ofh edema was liver related; has followu on    TYPE 2 DM she is diet controlled blood sugar is normal  Edema of feet and legs; she had this for almost a year. Some days worse than others. MILLER:  followed by Latonya Rogers ; she toldh er nothing in her liver;    5. Hypothyroidism; she is back on 200 mcg daily synthoid level was 32  she was only taking a pill a week; ; we had tunde her to change dose last week   6. Constipation she is just been using Colace she feels like she is not having good emptying of her colon.   6.  Health maintenance colon isnt due  til   #7 hypertension stable on losartan HCTZ 12.5    Past Medical History:   Diagnosis Date    Anxiety     Bruising     Diabetes mellitus ([address]     Edema     History of cellulitis     History of constipation     medication induced    Hypertension     Non-alcoholic cirrhosis ([address]     SDH (subdural hematoma) ([facility] [address] 10/30/2021    small, post fall ([address], [facility] [address]    Sleep disorder     Splenomegaly     Thyroid disease       Past Surgical History:   Procedure Laterality Date    CHOLECYSTECTOMY      COLONOSCOPY      normal, per pt    HYSTERECTOMY ([address])      SETH STEROTACTIC LOC BREAST BIOPSY RIGHT Right 2022    SETH STEROTACTIC LOC BREAST BIOPSY RIGHT 2022 [address] REMOVAL Bilateral 2000    AGE 50       Vitals 8/8/2022 8/4/2022 7/21/2022 7/12/2022 7/11/2022 4/48/8314   SYSTOLIC 857 703 017 353 234 528   DIASTOLIC 64 80 70 77 60 70   Site - - - - - Left Upper Arm   Pulse 80 90 - 104 86 86   Temp - - - 97.7 - -   Resp - - - 15 - -   SpO2 99 99 - 97 97 98   Weight 215 lb 219 lb 214 lb - 214 lb 212 lb   Height 5' 5\" 5' 5\" 5' 5\" - 5' 5\" 5' 5\"   Body mass index 35.77 kg/m2 36.44 kg/m2 35.61 kg/m2 - 35.61 kg/m2 35.27 kg/m2   Pain Level - - - 1 - -       Family History   Problem Relation Age of Onset    Heart Disease Mother     Breast Cancer Maternal Aunt 67    Breast Cancer Maternal Aunt 76    Stomach Cancer Maternal Uncle 72        Great Uncle    Colon Polyps Neg Hx     Colon Cancer Neg Hx        Social History     Tobacco Use    Smoking status: Never    Smokeless tobacco: Never   Substance Use Topics    Alcohol use: Never      Current Outpatient Medications   Medication Sig Dispense Refill    bumetanide (BUMEX) 0.5 MG tablet Take 1 tablet by mouth in the morning. 90 tablet 1    vitamin D (ERGOCALCIFEROL) 1.25 MG (26861 UT) CAPS capsule Take 1 capsule by mouth once a week 12 capsule 1    losartan-hydroCHLOROthiazide (HYZAAR) 50-12.5 MG per tablet Take 1 tablet by mouth daily      levothyroxine (SYNTHROID) 200 MCG tablet Take 200 mcg by mouth Daily      b complex vitamins capsule Take 1 capsule by mouth daily      traZODone (DESYREL) 50 MG tablet Take 50 mg by mouth nightly as needed for Sleep 1/2 tablet nightly as needed       No current facility-administered medications for this visit.      No Known Allergies    Health Maintenance   Topic Date Due    Diabetic retinal exam  Never done    Colorectal Cancer Screen  Never done    DTaP/Tdap/Td vaccine (1 - Tdap) 08/29/2022 (Originally 5/10/1964)    Shingles vaccine (1 of 2) 04/11/2023 (Originally 5/10/1997)    Pneumococcal 65+ years Vaccine (1 - PCV) 04/15/2024 (Originally 5/10/1951)    COVID-19 Vaccine (3 - Booster for Watson Peter series) 08/12/2024 (Originally 2/2/2022)    Flu vaccine (1) 09/01/2022    Lipids  04/15/2023    Diabetic foot exam  07/11/2023    A1C test (Diabetic or Prediabetic)  07/11/2023    Depression Screen  08/08/2023    Annual Wellness Visit (AWV)  08/09/2023    DEXA (modify frequency per FRAX score)  Completed    Hepatitis C screen  Completed    Hepatitis A vaccine  Aged Out    Hib vaccine  Aged Out    Meningococcal (ACWY) vaccine  Aged Out       Lab Results   Component Value Date    LABA1C 5.5 07/11/2022     No results found for: PSA, PSADIA  TSH   Date Value Ref Range Status   08/02/2022 32.260 (H) 0.270 - 4.200 uIU/mL Final   ]  Lab Results   Component Value Date     07/11/2022    K 4.1 07/11/2022     07/11/2022    CO2 25 07/11/2022    BUN 14 07/11/2022    CREATININE 0.8 07/11/2022    GLUCOSE 128 (H) 07/11/2022    CALCIUM 9.4 07/11/2022    PROT 6.9 07/11/2022    LABALBU 3.3 (L) 07/11/2022    BILITOT 0.8 08/04/2022    ALKPHOS 131 (H) 07/11/2022    AST 43 (H) 07/11/2022    ALT 21 08/04/2022    LABGLOM >60 07/11/2022    GFRAA >59 07/11/2022     Lab Results   Component Value Date    CHOL 170 04/15/2022     Lab Results   Component Value Date    TRIG 100 04/15/2022     Lab Results   Component Value Date    HDL 71 04/15/2022     Lab Results   Component Value Date    LDLCALC 79 04/15/2022     Lab Results   Component Value Date/Time     07/11/2022 08:28 AM    K 4.1 07/11/2022 08:28 AM     07/11/2022 08:28 AM    CO2 25 07/11/2022 08:28 AM    BUN 14 07/11/2022 08:28 AM    CREATININE 0.8 07/11/2022 08:28 AM    GLUCOSE 128 07/11/2022 08:28 AM    CALCIUM 9.4 07/11/2022 08:28 AM      Lab Results   Component Value Date    WBC 3.63 (L) 08/04/2022    HGB 11.9 08/04/2022    HCT 36.7 08/04/2022    MCV 95.1 (H) 08/04/2022    PLT 56 (L) 08/04/2022    LYMPHOPCT 33.6 08/04/2022    RBC 3.86 (L) 08/04/2022    MCH 30.8 08/04/2022    MCHC 32.4 08/04/2022    RDW 14.8 (H) 08/04/2022     Lab Results   Component Value Date    VITD25 39.6 07/11/2022     Labs reviewed from 8/4/2022  Diagnostics reviewed from   Subjective:      Review of Systems   Constitutional:  Negative for fatigue, fever and unexpected weight change. HENT:  Negative for ear discharge, ear pain, mouth sores, sore throat and trouble swallowing. Eyes:  Negative for discharge, itching and visual disturbance. Respiratory:  Negative for cough, choking, shortness of breath, wheezing and stridor. Cardiovascular:  Negative for chest pain, palpitations and leg swelling. Gastrointestinal:  Positive for constipation. Negative for abdominal distention, abdominal pain, blood in stool, diarrhea, nausea and vomiting. Endocrine: Negative for cold intolerance, polydipsia and polyuria. Genitourinary:  Negative for difficulty urinating, dysuria, frequency and urgency. Musculoskeletal:  Negative for arthralgias and gait problem. Skin:  Negative for color change and rash. Allergic/Immunologic: Negative for food allergies and immunocompromised state. Neurological:  Negative for dizziness, tremors, syncope, speech difficulty, weakness and headaches. Hematological:  Negative for adenopathy. Does not bruise/bleed easily. Psychiatric/Behavioral:  Negative for confusion and hallucinations. Objective:     Physical Exam  Constitutional:       General: She is not in acute distress. Appearance: She is well-developed. HENT:      Head: Normocephalic and atraumatic. Eyes:      General: No scleral icterus. Right eye: No discharge. Left eye: No discharge. Pupils: Pupils are equal, round, and reactive to light. Neck:      Thyroid: No thyromegaly. Vascular: No JVD. Cardiovascular:      Rate and Rhythm: Normal rate and regular rhythm. Heart sounds: Normal heart sounds. No murmur heard. Pulmonary:      Effort: Pulmonary effort is normal. No respiratory distress. Breath sounds: Normal breath sounds. No wheezing or rales.    Abdominal: General: Bowel sounds are normal. There is no distension. Palpations: Abdomen is soft. There is no mass. Tenderness: There is no abdominal tenderness. There is no guarding or rebound. Musculoskeletal:         General: Swelling present. No tenderness. Normal range of motion. Cervical back: Normal range of motion and neck supple. Skin:     General: Skin is warm and dry. Findings: No erythema or rash. Neurological:      Mental Status: She is alert and oriented to person, place, and time. Cranial Nerves: No cranial nerve deficit. Coordination: Coordination normal.      Deep Tendon Reflexes: Reflexes are normal and symmetric. Reflexes normal.   Psychiatric:         Mood and Affect: Mood is not depressed. Behavior: Behavior normal.         Thought Content:  Thought content normal.         Judgment: Judgment normal.     /64   Pulse 80   Ht 5' 5\" (1.651 m)   Wt 215 lb (97.5 kg)   SpO2 99%   BMI 35.78 kg/m²           Assessment:      Problem List       Localized edema     We will add Bumex daily          Relevant Medications    losartan-hydroCHLOROthiazide (HYZAAR) 50-12.5 MG per tablet    bumetanide (BUMEX) 0.5 MG tablet    Slow transit constipation     Suggestion is a couple bottles of mag citrate and then daily MiraLAX          Thrombocytopenia (HCC)     Her platelets actually gone down even more with this last labs she has an appointment to see [employer] in a couple weeks          Controlled type 2 diabetes mellitus without complication, without long-term current use of insulin ([address] - Primary     Stable diet controlled          Relevant Orders    Hemoglobin A1C    Urinalysis with Reflex to Culture    Insomnia    MILLER (nonalcoholic steatohepatitis)     Followed by GI          Relevant Orders    CBC with Auto Differential    Comprehensive Metabolic Panel    Other specified hypothyroidism     We will restart her Synthroid 200 mcg every day and we will recheck in 6 weeks Relevant Medications    levothyroxine (SYNTHROID) 200 MCG tablet    Other Relevant Orders    TSH    Primary hypertension     She stable with losartan HCTZ 50/12.5             Plan:        Patient given educational materials - see patient instructions. Discussed use, benefit, and side effects of prescribed medications. Allpatient questions answered. Pt voiced understanding. Reviewed health maintenance. Instructed to continue current medications, diet and exercise. Patient agreed with treatment plan. Follow up as directed. MEDICATIONS:  Orders Placed This Encounter   Medications    bumetanide (BUMEX) 0.5 MG tablet     Sig: Take 1 tablet by mouth in the morning. Dispense:  90 tablet     Refill:  1           ORDERS:  Orders Placed This Encounter   Procedures    CBC with Auto Differential    Comprehensive Metabolic Panel    Hemoglobin A1C    Vitamin D 25 Hydroxy    Urinalysis with Reflex to Culture    TSH         Follow-up:  No follow-ups on file. PATIENT INSTRUCTIONS:  Patient Instructions    Thrombocytopenia;  keep fu with [place] Ryan   tyepII DM; The current medical regimen is effective;  continue present plan and medications. Edema of feet and legs;  bumex 0.5 mg daily   MILLER: followed by Brando Stevens  Hypothyroidism;  4 weeks  get repeat thyroid TSH     Constipation;  maybe clean yourself out with a couple of bottles of mag citrate, then start taking 1 capful of miralax daily ro prevent constipation   Electronically signed by Karyl Osler, APRN on 8/8/2022 at 12:21 PM    @    Jazmin/transcription disclaimer:  Much of this encounter note is electronic transcription/translation of spoken language to printed texts. The electronic translation of spoken language may be erroneous, or at times,nonsensical words or phrases may be inadvertently transcribed.   Although I have reviewed the note for such errors, some may still exist.  Medicare Annual Wellness Visit    Eleanor Miranda is here for [facility] AWV    Assessment & Plan   Controlled type 2 diabetes mellitus without complication, without long-term current use of insulin (HCC)  Assessment & Plan:  Stable diet controlled   Orders:  -     Hemoglobin A1C; Future  -     Urinalysis with Reflex to Culture; Future  Primary hypertension  Assessment & Plan:  She stable with losartan HCTZ 50/12.5   Other specified hypothyroidism  Assessment & Plan: We will restart her Synthroid 200 mcg every day and we will recheck in 6 weeks  Orders:  -     TSH; Future  Primary insomnia  MILLER (nonalcoholic steatohepatitis)  Assessment & Plan:  Followed by GI   Orders:  -     CBC with Auto Differential; Future  -     Comprehensive Metabolic Panel; Future  Thrombocytopenia ([address]  Assessment & Plan:  Her platelets actually gone down even more with this last labs she has an appointment to see Yusuf Cruz in a couple weeks   Localized edema  Assessment & Plan: We will add Bumex daily   Vitamin D deficiency  -     Vitamin D 25 Hydroxy; Future  Severe obesity (BMI 35.0-39. 9) with comorbidity (HCC)  Slow transit constipation  Assessment & Plan:  Suggestion is a couple bottles of mag citrate and then daily MiraLAX     Recommendations for Preventive Services Due: see orders and patient instructions/AVS.  Recommended screening schedule for the next 5-10 years is provided to the patient in written form: see Patient Instructions/AVS.     No follow-ups on file. Subjective       Patient's complete Health Risk Assessment and screening values have been reviewed and are found in Flowsheets. The following problems were reviewed today and where indicated follow up appointments were made and/or referrals ordered.     Positive Risk Factor Screenings with Interventions:             General Health and ACP:  General  In general, how would you say your health is?: Good  In the past 7 days, have you experienced any of the following: New or Increased Pain, New or Increased Fatigue, Loneliness, Social Isolation, Stress or Anger?: No  Do you get the social and emotional support that you need?: Yes  Do you have a Living Will?: Yes    Advance Directives       Power of  Living Will ACP-Advance Directive ACP-Power of     Not on File Not on File Not on File Not on File        General Health Risk Interventions:  na    Health Habits/Nutrition:  Physical Activity: Sufficiently Active    Days of Exercise per Week: 7 days    Minutes of Exercise per Session: 60 min     Have you lost any weight without trying in the past 3 months?: No  Body mass index: (!) 35.77  Have you seen the dentist within the past year?: Yes  Health Habits/Nutrition Interventions:  Inadequate physical activity:  patient is not ready to increase his/her physical activity level at this time             Objective   Vitals:    08/08/22 1134   BP: 132/64   Pulse: 80   SpO2: 99%   Weight: 215 lb (97.5 kg)   Height: 5' 5\" (1.651 m)      Body mass index is 35.78 kg/m². No Known Allergies  Prior to Visit Medications    Medication Sig Taking? Authorizing Provider   bumetanide (BUMEX) 0.5 MG tablet Take 1 tablet by mouth in the morning.  Yes KATIA Huynh   vitamin D (ERGOCALCIFEROL) 1.25 MG (66665 UT) CAPS capsule Take 1 capsule by mouth once a week  KATIA Huynh   losartan-hydroCHLOROthiazide (HYZAAR) 50-12.5 MG per tablet Take 1 tablet by mouth daily  Historical Provider, MD   levothyroxine (SYNTHROID) 200 MCG tablet Take 200 mcg by mouth Daily  Historical Provider, MD   b complex vitamins capsule Take 1 capsule by mouth daily  Historical Provider, MD   traZODone (DESYREL) 50 MG tablet Take 50 mg by mouth nightly as needed for Sleep 1/2 tablet nightly as needed  Historical Provider, MD Moy (Including outside providers/suppliers regularly involved in providing care):   Patient Care Team:  KATIA Huynh as PCP - General (Internal Medicine)  KATIA Huynh as PCP - REHABILITATION [facility] Empaneled Provider     Reviewed and updated this visit:  Tobacco  Allergies  Meds  Med Hx  Surg Hx  Soc Hx  Fam Hx             Medicare Annual Wellness Visit    Tomer Cobian is here for Medicare AWV    Assessment & Plan   Controlled type 2 diabetes mellitus without complication, without long-term current use of insulin ([address]  Assessment & Plan:  Stable diet controlled   Orders:  -     Hemoglobin A1C; Future  -     Urinalysis with Reflex to Culture; Future  -     CBC with Auto Differential; Future  -     Comprehensive Metabolic Panel; Future  -     Hemoglobin A1C; Future  Primary hypertension  Assessment & Plan:  She stable with losartan HCTZ 50/12.5   Other specified hypothyroidism  Assessment & Plan: We will restart her Synthroid 200 mcg every day and we will recheck in 6 weeks  Orders:  -     TSH; Future  -     TSH; Future  Primary insomnia  MILLER (nonalcoholic steatohepatitis)  Assessment & Plan:  Followed by GI   Orders:  -     CBC with Auto Differential; Future  -     Comprehensive Metabolic Panel; Future  Thrombocytopenia ([address]  Assessment & Plan:  Her platelets actually gone down even more with this last labs she has an appointment to see Kaden Escobar in a couple weeks   Localized edema  Assessment & Plan: We will add Bumex daily   Vitamin D deficiency  -     Vitamin D 25 Hydroxy; Future  Severe obesity (BMI 35.0-39. 9) with comorbidity (HCC)  Slow transit constipation  Assessment & Plan:  Suggestion is a couple bottles of mag citrate and then daily MiraLAX   Initial Medicare annual wellness visit    Recommendations for Preventive Services Due: see orders and patient instructions/AVS.  Recommended screening schedule for the next 5-10 years is provided to the patient in written form: see Patient Instructions/AVS.     Return for Medicare Annual Wellness Visit in 1 year. Subjective       Patient's complete Health Risk Assessment and screening values have been reviewed and are found in Flowsheets.  The following problems were reviewed today and where indicated follow up appointments were made and/or referrals ordered. Positive Risk Factor Screenings with Interventions:             General Health and ACP:  General  In general, how would you say your health is?: Good  In the past 7 days, have you experienced any of the following: New or Increased Pain, New or Increased Fatigue, Loneliness, Social Isolation, Stress or Anger?: No  Do you get the social and emotional support that you need?: Yes  Do you have a Living Will?: Yes    Advance Directives       Power of  Living Will ACP-Advance Directive ACP-Power of     Not on File Not on File Not on File Not on File          General Health Risk Interventions:  na    Health Habits/Nutrition:  Physical Activity: Sufficiently Active    Days of Exercise per Week: 7 days    Minutes of Exercise per Session: 60 min     Have you lost any weight without trying in the past 3 months?: No  Body mass index: (!) 35.77  Have you seen the dentist within the past year?: Yes  Health Habits/Nutrition Interventions:               Objective   Vitals:    08/08/22 1134   BP: 132/64   Pulse: 80   SpO2: 99%   Weight: 215 lb (97.5 kg)   Height: 5' 5\" (1.651 m)      Body mass index is 35.78 kg/m². No Known Allergies  Prior to Visit Medications    Medication Sig Taking? Authorizing Provider   bumetanide (BUMEX) 0.5 MG tablet Take 1 tablet by mouth in the morning.  Yes KATIA Lundberg   vitamin D (ERGOCALCIFEROL) 1.25 MG (52324 UT) CAPS capsule Take 1 capsule by mouth once a week  KATIA Lundberg   losartan-hydroCHLOROthiazide (HYZAAR) 50-12.5 MG per tablet Take 1 tablet by mouth daily  Historical Provider, MD   levothyroxine (SYNTHROID) 200 MCG tablet Take 200 mcg by mouth Daily  Historical Provider, MD   b complex vitamins capsule Take 1 capsule by mouth daily  Historical Provider, MD   traZODone (DESYREL) 50 MG tablet Take 50 mg by mouth nightly as needed for Sleep 1/2 tablet nightly as needed Historical Provider, MD Moy (Including outside providers/suppliers regularly involved in providing care):   Patient Care Team:  KATIA Turner as PCP - General (Internal Medicine)  KATIA Turner as PCP - [facility] Empaneled Provider     Reviewed and updated this visit:  Tobacco  Allergies  Meds  Med Hx  Surg Hx  Soc Hx  Fam Hx

## 2022-08-08 NOTE — ASSESSMENT & PLAN NOTE
Her platelets actually gone down even more with this last labs she has an appointment to see Alexis Garcia in a couple weeks

## 2022-08-17 NOTE — PROGRESS NOTES
OP HEMATOLOGY/ONCOLOGY PROGRESS NOTE      Pt Name: Koby Lopez  Birthdate: [birth year]  MRN: [id number]  Referring provider: KATIA Haider  PCP: KATIA Beckman  Date of evaluation: 8/18/2022    History Obtained From:  patient, electronic medical record    CHIEF COMPLAINT:    Chief Complaint   Patient presents with    Follow-up     Thrombocytopenia ([address]     HISTORY OF PRESENT ILLNESS:    Velma Negron \"Helen\" Gallo Ornelas is a 78 y.o.  female returning to the clinic to discuss serology completed for evaluation of leukopenia and thrombocytopenia. Patient was also noted to have significant splenomegaly. She reports a known history of MILLER. She has also undergone medication adjustment for hypothyroidism. Helen denies B symptoms. She denies recurrent infections. She has easy bruising, though denies overt bleeding. Overall, she feels well. HEMATOLOGY HISTORY: Thrombocytopenia  Velma Negron \"Helen\" Gallo Ornelas was seen in hematology consultation 8/4/2022, referred by KATIA Beckman for evaluation of thrombocytopenia. PMH significant for T2DM, HTN, hypothyroidism, insomnia, IMLLER    Labs 4/15/2022 by PCP:  CBC: WBC 5.3, Hgb 11.6/MCV 92, platelets 08,964  CMP: Total bilirubin 1.4, alkaline phosphatase 136, AST 41  Hepatitis C Ab: Nonreactive  TSH: 0.161  Vitamin D: 39.6  HgbA1c: 5.5%  Vitamin B12: 0    Helen has a documented history of MILLER in 2018. She states she was previously evaluated by gastroenterologist, Dr. Tan Hazel in [place], [place]. She was told she has a fatty liver, diet/exercise recommended. Helen was referred by her PCP to [facility] gastroenterology, evaluated by KATIA Wetzel on 5/13/2022. Liver ultrasound 6/21/2022 at [facility] documented preserved echogenicity of the liver, without evidence of discrete hepatic mass and dilated CBD post a cholecystectomy. The spleen was noted to be enlarged at 17.9 x 5.2 x 14.8 cm with a volume of 720.8 mL.     Review of CBCs:    Platelets were 107,000 on 10/30/2021 per CBC from [facility] in [place], [place]    CBC 8/4/2022 (hematology consultation): WBC 3.63, Hgb 11.9/MCV 95.1, platelets 73,658. Normal differentials. Helen denies NSAID use. She is a nondrinker. She bruises easily, though denies overt bleeding. TSH 8/2/2022: 32.26    Labs 8/4/2022:  LD: 259  Folate: 11.6  HIV: negative  Fibrosure: Fibrosis score F4-cirrhosis, necroinflammatory activity score: 0.18, A0-A1  Flow cytometry: No abnormal myeloid, B cell, T cell, or NK cell population identified  PBS: ERYTHROCYTES: normal count, normocytic normochromic, minimal   anisopoikilocytosis, mild polychromasia   WHITE BLOOD CELLS: decreased, morphologically unremarkable mature   neutrophils and lymphocytes   PLATELETS: decreased, normal size and granularity    Suspect leukopenia and thrombocytopenia related to splenomegaly and likely exacerbated by hypothyroidism. Regarding splenomegaly, suspect possible liver disease. No evidence to suggest lymphoma, peripheral blood smear negative  Will check JAK2 w/reflexes to r/o MPN  Will discuss with GI.     Past Medical History:   Diagnosis Date    Anxiety     Bruising     Diabetes mellitus ([address]     Edema     History of cellulitis     History of constipation     medication induced    Hypertension     Non-alcoholic cirrhosis ([address]     SDH (subdural hematoma) ([facility] 10/30/2021    small, post fall ([address] [place] [address] [place]    Sleep disorder     Splenomegaly     Thyroid disease      Past Surgical History:   Procedure Laterality Date    CHOLECYSTECTOMY  1978    COLONOSCOPY  2014    normal, per pt    HYSTERECTOMY ([address])  2000    SETH STEROTACTIC LOC BREAST BIOPSY RIGHT Right 04/27/2022    SETH STEROTACTIC LOC BREAST BIOPSY RIGHT 4/27/2022 [facility] [address]    OVARY REMOVAL Bilateral 2000    AGE 55       Current Outpatient Medications:     bumetanide (BUMEX) 0.5 MG tablet, Take 1 tablet by mouth in the morning., Disp: 90 tablet, Rfl: 1 vitamin D (ERGOCALCIFEROL) 1.25 MG (15788 UT) CAPS capsule, Take 1 capsule by mouth once a week, Disp: 12 capsule, Rfl: 1    losartan-hydroCHLOROthiazide (HYZAAR) 50-12.5 MG per tablet, Take 1 tablet by mouth daily, Disp: , Rfl:     levothyroxine (SYNTHROID) 200 MCG tablet, Take 200 mcg by mouth Daily, Disp: , Rfl:     b complex vitamins capsule, Take 1 capsule by mouth daily, Disp: , Rfl:     traZODone (DESYREL) 50 MG tablet, Take 50 mg by mouth nightly as needed for Sleep 1/2 tablet nightly as needed, Disp: , Rfl:    Allergies: No Known Allergies  Social History     Tobacco Use    Smoking status: Never    Smokeless tobacco: Never   Vaping Use    Vaping Use: Never used   Substance Use Topics    Alcohol use: Never    Drug use: Never     Family History   Problem Relation Age of Onset    Heart Disease Mother     Breast Cancer Maternal Aunt 67    Breast Cancer Maternal Aunt 76    Stomach Cancer Maternal Uncle 72        Great Uncle    Colon Polyps Neg Hx     Colon Cancer Neg Hx      Health Maintenance   Topic Date Due    Diabetic retinal exam  Never done    DTaP/Tdap/Td vaccine (1 - Tdap) 08/29/2022 (Originally 5/10/1964)    Shingles vaccine (1 of 2) 04/11/2023 (Originally 5/10/1997)    Pneumococcal 65+ years Vaccine (1 - PCV) 04/15/2024 (Originally 5/10/1951)    COVID-19 Vaccine (3 - Booster for Watson Peter series) 08/12/2024 (Originally 2/2/2022)    Flu vaccine (1) 09/01/2022    Lipids  04/15/2023    Diabetic foot exam  07/11/2023    A1C test (Diabetic or Prediabetic)  07/11/2023    Depression Screen  08/08/2023    Annual Wellness Visit (AWV)  08/09/2023    Colorectal Cancer Screen  08/01/2032    DEXA (modify frequency per FRAX score)  Completed    Hepatitis C screen  Completed    Hepatitis A vaccine  Aged Out    Hib vaccine  Aged Out    Meningococcal (ACWY) vaccine  Aged Out     Subjective   Review of Systems   Constitutional:  Positive for fatigue. Negative for fever and unexpected weight change.    HENT:  Negative for dental problem, hearing loss, mouth sores, nosebleeds, sore throat and trouble swallowing. History of broken nose twice in the last year   Eyes:  Negative for discharge and itching. Respiratory:  Negative for cough, shortness of breath and wheezing. Cardiovascular:  Negative for chest pain and palpitations. Gastrointestinal:  Positive for constipation. Negative for abdominal pain, diarrhea, nausea and vomiting. Endocrine: Positive for cold intolerance. Negative for heat intolerance. Genitourinary:  Negative for dysuria, frequency, hematuria and urgency. Musculoskeletal:  Negative for arthralgias, joint swelling and myalgias. Skin:  Negative for pallor and rash. Allergic/Immunologic: Negative for environmental allergies and immunocompromised state. Neurological:  Negative for seizures, syncope and numbness. Balance issues    Hematological:  Negative for adenopathy. Bruises/bleeds easily. Psychiatric/Behavioral:  Negative for agitation, behavioral problems and confusion. History of anxiety/depression   Objective   Physical Exam  Vitals reviewed. Constitutional:       General: She is not in acute distress. Appearance: She is well-developed. She is not toxic-appearing or diaphoretic. Comments: Wearing a facial mask. HENT:      Head: Normocephalic and atraumatic. Right Ear: External ear normal.      Left Ear: External ear normal.      Nose: Nose normal.      Mouth/Throat:      Mouth: Mucous membranes are moist.   Eyes:      General: No scleral icterus. Right eye: No discharge. Left eye: No discharge. Conjunctiva/sclera: Conjunctivae normal.   Neck:      Trachea: No tracheal deviation. Cardiovascular:      Rate and Rhythm: Normal rate and regular rhythm. Pulmonary:      Effort: Pulmonary effort is normal. No respiratory distress. Breath sounds: Normal breath sounds. No wheezing or rales.    Chest:   Breasts:     Right: No abnormal myeloid, B cell, T cell, or NK cell population identified  PBS: ERYTHROCYTES: normal count, normocytic normochromic, minimal   anisopoikilocytosis, mild polychromasia   WHITE BLOOD CELLS: decreased, morphologically unremarkable mature   neutrophils and lymphocytes   PLATELETS: decreased, normal size and granularity    ASSESSMENT/PLAN:    1. Thrombocytopenia (HCC)    2. Leukopenia, unspecified type    3. Splenomegaly    4. MILLER (nonalcoholic steatohepatitis)      Leukopenia/Thrombocytopenia related to splenomegaly  Recent Labs     08/18/22  1415 08/04/22  1408   WBC 3.94* 3.63*   HGB 12.0 11.9   HCT 36.8 36.7   MCV 93.2 95.1*   PLT 61* 56*     Labs 8/4/2022:  LD: 259  Folate: 11.6  HIV: negative  Fibrosure: Fibrosis score F4-cirrhosis, necroinflammatory activity score: 0.18, A0-A1  Flow cytometry: No abnormal myeloid, B cell, T cell, or NK cell population identified  PBS: ERYTHROCYTES: normal count, normocytic normochromic, minimal   anisopoikilocytosis, mild polychromasia   WHITE BLOOD CELLS: decreased, morphologically unremarkable mature   neutrophils and lymphocytes   PLATELETS: decreased, normal size and granularity    Suspect leukopenia and thrombocytopenia from splenomegaly secondary to possible liver disease  Could be exacerbated by significant hypothyroidism, currently being addressed by PCP  Patient denies NSAID use. Reiterated to avoid. Splenomegaly  Again, suspect related to possible liver disease  No evidence to suggest lymphoma, peripheral blood smear negative  Will check JAK2 w/reflexes to r/o MPN    MILLER (nonalcoholic steatohepatitis)  History of MILLER  FibroSure suggests fibrosis score of F4, cirrhosis  CMP w/ abn LFTs  Will reach out to KATIA Santos with [facility] to see if further evaluation is warranted  ? MRI liver    Hypothyroidism  TSH 32 on 8/2/2022, addressed by PCP  Currently taking Synthroid 200 mcg daily    Orders Placed This Encounter   Procedures    Platelet Antibodies, Indirect    JAK2 V617F Mutation Analysis, Qual rflx CALR/Exon 12-15/MPL    Immature Platelet Fraction    Copper, Serum    MONOCLONAL PROTEIN AND FLC, SERUM     Tumor Screening:  Colonoscopy 2014; 10 yr recall per previous progress note from KATAI Fournier  Mammogram 4/14/2022 at [facility]: BI-RADS category 0. Calcifications right breast mid depth 3:00 position  Mammogram Right breast 4/15/2022 at [facility]: BI-RADS category 4  Right Breast Biopsy 4/27/2022 at [facility] by Dr. Areli Farley: findings are benign. Recall recommended in 6 months (DUE 10/27/2022)    Monitor CBC every 3 weeks. Return in about 4 months (around 12/18/2022) for follow up with KATIA Chinchilla. I have seen, examined and reviewed this patient medication list, appropriate labs and imaging studies. I reviewed relevant medical records and others physicians notes. I discussed the plan of care with the patient. I answered all questions to the patients satisfaction. I have also reviewed the chief complaint (CC) and part of the history (History of Present Illness (HPI), Past Family Social History [facility]), or Review of Systems (ROS) and made changes when appropriated. Dictated utilizing Dragon transcription software.         KATIA Fowler  2:06 PM  8/18/2022

## 2022-08-18 ENCOUNTER — HOSPITAL ENCOUNTER (OUTPATIENT)
Dept: INFUSION THERAPY | Age: 77
Discharge: HOME | End: 2022-08-18
Payer: MEDICARE

## 2022-08-18 ENCOUNTER — OFFICE VISIT (OUTPATIENT)
Dept: HEMATOLOGY | Age: 77
End: 2022-08-18
Payer: MEDICARE

## 2022-08-18 VITALS
HEART RATE: 88 BPM | DIASTOLIC BLOOD PRESSURE: 66 MMHG | SYSTOLIC BLOOD PRESSURE: 140 MMHG | BODY MASS INDEX: 35.32 KG/M2 | OXYGEN SATURATION: 99 % | WEIGHT: 212 LBS | HEIGHT: 65 IN

## 2022-08-18 DIAGNOSIS — D72.819 LEUKOPENIA, UNSPECIFIED TYPE: ICD-10-CM

## 2022-08-18 DIAGNOSIS — D69.6 THROMBOCYTOPENIA (HCC): Primary | ICD-10-CM

## 2022-08-18 DIAGNOSIS — D69.6 THROMBOCYTOPENIA (HCC): ICD-10-CM

## 2022-08-18 DIAGNOSIS — R16.1 SPLENOMEGALY: ICD-10-CM

## 2022-08-18 DIAGNOSIS — K75.81 NASH (NONALCOHOLIC STEATOHEPATITIS): ICD-10-CM

## 2022-08-18 LAB
BASOPHILS ABSOLUTE: 0.03 K/UL (ref 0.01–0.08)
BASOPHILS RELATIVE PERCENT: 0.8 % (ref 0.1–1.2)
EOSINOPHILS ABSOLUTE: 0.08 K/UL (ref 0.04–0.54)
EOSINOPHILS RELATIVE PERCENT: 2 % (ref 0.7–7)
HCT VFR BLD CALC: 36.8 % (ref 34.1–44.9)
HEMOGLOBIN: 12 G/DL (ref 11.2–15.7)
IMMATURE CELLS: 3.2 % (ref 0.9–6.5)
LYMPHOCYTES ABSOLUTE: 1.34 K/UL (ref 1.18–3.74)
LYMPHOCYTES RELATIVE PERCENT: 34 % (ref 19.3–53.1)
MCH RBC QN AUTO: 30.4 PG (ref 25.6–32.2)
MCHC RBC AUTO-ENTMCNC: 32.6 G/DL (ref 32.3–35.5)
MCV RBC AUTO: 93.2 FL (ref 79.4–94.8)
MONOCYTES ABSOLUTE: 0.22 K/UL (ref 0.24–0.82)
MONOCYTES RELATIVE PERCENT: 5.6 % (ref 4.7–12.5)
NEUTROPHILS ABSOLUTE: 2.26 K/UL (ref 1.56–6.13)
NEUTROPHILS RELATIVE PERCENT: 57.3 % (ref 34–71.1)
PDW BLD-RTO: 15.2 % (ref 11.7–14.4)
PLATELET # BLD: 61 K/UL (ref 182–369)
PMV BLD AUTO: 10.5 FL (ref 7.4–10.4)
RBC # BLD: 3.95 M/UL (ref 3.93–5.22)
WBC # BLD: 3.94 K/UL (ref 3.98–10.04)

## 2022-08-18 PROCEDURE — 1036F TOBACCO NON-USER: CPT | Performed by: NURSE PRACTITIONER

## 2022-08-18 PROCEDURE — G8427 DOCREV CUR MEDS BY ELIG CLIN: HCPCS | Performed by: NURSE PRACTITIONER

## 2022-08-18 PROCEDURE — 99212 OFFICE O/P EST SF 10 MIN: CPT

## 2022-08-18 PROCEDURE — 85025 COMPLETE CBC W/AUTO DIFF WBC: CPT

## 2022-08-18 PROCEDURE — 1090F PRES/ABSN URINE INCON ASSESS: CPT | Performed by: NURSE PRACTITIONER

## 2022-08-18 PROCEDURE — G8399 PT W/DXA RESULTS DOCUMENT: HCPCS | Performed by: NURSE PRACTITIONER

## 2022-08-18 PROCEDURE — 3017F COLORECTAL CA SCREEN DOC REV: CPT | Performed by: NURSE PRACTITIONER

## 2022-08-18 PROCEDURE — 1123F ACP DISCUSS/DSCN MKR DOCD: CPT | Performed by: NURSE PRACTITIONER

## 2022-08-18 PROCEDURE — G8417 CALC BMI ABV UP PARAM F/U: HCPCS | Performed by: NURSE PRACTITIONER

## 2022-08-18 PROCEDURE — 99214 OFFICE O/P EST MOD 30 MIN: CPT | Performed by: NURSE PRACTITIONER

## 2022-08-18 ASSESSMENT — ENCOUNTER SYMPTOMS
SORE THROAT: 0
EYE DISCHARGE: 0
DIARRHEA: 0
WHEEZING: 0
VOMITING: 0
EYE ITCHING: 0
ABDOMINAL PAIN: 0
SHORTNESS OF BREATH: 0
CONSTIPATION: 1
COUGH: 0
NAUSEA: 0
TROUBLE SWALLOWING: 0

## 2022-08-19 ENCOUNTER — TELEPHONE (OUTPATIENT)
Dept: INFUSION THERAPY | Age: 77
End: 2022-08-19

## 2022-08-19 ENCOUNTER — TELEPHONE (OUTPATIENT)
Dept: HEMATOLOGY | Age: 77
End: 2022-08-19

## 2022-08-19 NOTE — TELEPHONE ENCOUNTER
Discussed pt option regarding bone marrow biopsy. Pt would like to avoid sedation and would like to schedule in office bone marrow biopsy. Also discussed post bone marrow biopsy choices regarding liver studies.  Hepatology, 2nd opinion GI, or liver biopsy will consider that after bone marrow results

## 2022-08-21 LAB — PLATELET ANTIBODY INDIRECT: NORMAL

## 2022-08-22 ENCOUNTER — OFFICE VISIT (OUTPATIENT)
Dept: ENT CLINIC | Age: 77
End: 2022-08-22
Payer: MEDICARE

## 2022-08-22 VITALS
BODY MASS INDEX: 35.32 KG/M2 | WEIGHT: 212 LBS | SYSTOLIC BLOOD PRESSURE: 148 MMHG | HEIGHT: 65 IN | DIASTOLIC BLOOD PRESSURE: 84 MMHG

## 2022-08-22 DIAGNOSIS — S02.2XXD CLOSED FRACTURE OF NASAL BONE WITH ROUTINE HEALING, SUBSEQUENT ENCOUNTER: Primary | ICD-10-CM

## 2022-08-22 LAB
+IMM: ABNORMAL
ALBUMIN SERPL-MCNC: 3.91 G/DL (ref 3.75–5.01)
ALPHA-1-GLOBULIN: 0.26 G/DL (ref 0.19–0.46)
ALPHA-2-GLOBULIN: 0.56 G/DL (ref 0.48–1.05)
BETA GLOBULIN: 0.84 G/DL (ref 0.48–1.1)
COPPER: 126.1 UG/DL (ref 80–155)
GAMMA GLOBULIN: 1.72 G/DL (ref 0.62–1.51)
IGA: 336 MG/DL (ref 68–408)
IGG: 1826 MG/DL (ref 768–1632)
IGM: 241 MG/DL (ref 35–263)
KAPPA FREE LIGHT CHAINS QNT: 61.2 MG/L (ref 3.3–19.4)
KAPPA/LAMBDA FREE LIGHT CHAIN RATIO: 1.47 (ref 0.26–1.65)
LAMBDA FREE LIGHT CHAINS QNT: 41.59 MG/L (ref 5.71–26.3)
SPE/IFE INTERPRETATION: ABNORMAL
TOTAL PROTEIN: 7.3 G/DL (ref 6.3–8.2)

## 2022-08-22 PROCEDURE — 99213 OFFICE O/P EST LOW 20 MIN: CPT | Performed by: PHYSICIAN ASSISTANT

## 2022-08-22 PROCEDURE — 1123F ACP DISCUSS/DSCN MKR DOCD: CPT | Performed by: PHYSICIAN ASSISTANT

## 2022-08-22 PROCEDURE — G8417 CALC BMI ABV UP PARAM F/U: HCPCS | Performed by: PHYSICIAN ASSISTANT

## 2022-08-22 PROCEDURE — 3017F COLORECTAL CA SCREEN DOC REV: CPT | Performed by: PHYSICIAN ASSISTANT

## 2022-08-22 PROCEDURE — G8399 PT W/DXA RESULTS DOCUMENT: HCPCS | Performed by: PHYSICIAN ASSISTANT

## 2022-08-22 PROCEDURE — 1090F PRES/ABSN URINE INCON ASSESS: CPT | Performed by: PHYSICIAN ASSISTANT

## 2022-08-22 PROCEDURE — 1036F TOBACCO NON-USER: CPT | Performed by: PHYSICIAN ASSISTANT

## 2022-08-22 PROCEDURE — G8427 DOCREV CUR MEDS BY ELIG CLIN: HCPCS | Performed by: PHYSICIAN ASSISTANT

## 2022-08-22 NOTE — PROGRESS NOTES
Ms. Jarvis Mckay is a pleasant 72-year-old  female that presents for follow-up after having a closed fracture of the nasal bones after accidental fall. She reports that she is having no breathing issues and is also having very little pain from the nasal fracture. She reports that she sleeps well at night with no issues. Physical examination revealed the patient had normal TMs canals bilaterally. Nasal exam demonstrated the nose to be deviated to the right with no obstruction noted to the right nare. The left nare demonstrated evidence of moderate turbinate hypertrophy of the inferior turbinate with no abnormalities. Patient was able to breathe through both nares with no issues. Oral exam was unrevealing. Neck exam demonstrated no lymphadenopathy. Impression: Doing well status post accidental fall with closed nasal fracture    Plan: Patient is to follow-up as needed. She was reminded to call if she notices any type of breathing issues or has questions.       Electronically signed by Dulce Ram PA-C on 8/22/22 at 11:41 AM CDT

## 2022-08-31 ENCOUNTER — TELEPHONE (OUTPATIENT)
Dept: HEMATOLOGY | Age: 77
End: 2022-08-31

## 2022-08-31 NOTE — PROGRESS NOTES
OP HEMATOLOGY/ONCOLOGY PROGRESS NOTE      Pt Name: Astrid Velazquez  YOB: 1871  MRN: [id number]  Referring provider: KATIA Breaux  PCP: KATIA Kenyon  Date of evaluation: 9/1/2022    History Obtained From:  patient, electronic medical record    CHIEF COMPLAINT:    Chief Complaint   Patient presents with    Follow-up     Thrombocytopenia ([address]     HISTORY OF PRESENT ILLNESS:    Ronnell Madison \"Helen\" Sergio Baker is a 78 y.o.  female followed in this clinic for leukopenia and thrombocytopenia. Patient was also noted to have significant splenomegaly. She reports a known history of MILLER. She was previously evaluated by [facility] in the [facility] 2019. Medical record reviewed and documented a history of cirrhosis with hepatosplenomegaly and changes of portal hypertension including gastric varices noted on CT scan dated 9/21/2018. EGD 11/2018 was normal, with no varices. Patient is more recently followed by [facility]) Gastroenterology. She has also undergone medication adjustment for hypothyroidism. Helen denies B symptoms. She denies recurrent infections. She has easy bruising, though denies overt bleeding. Overall, she feels well. She returns to the clinic, scheduled for bone marrow aspirate and biopsy. Helen is very nervous however and would like additional discussion regarding bone marrow, possibly to reschedule. CBC includes a WBC of 3.66, ALC 1.09. Hgb 11.8 and platelet count 05,378. HEMATOLOGY HISTORY: Thrombocytopenia  Ronnell Madison \"Helen\" Sergio Baker was seen in hematology consultation 8/4/2022, referred by KATIA Kenyon for evaluation of thrombocytopenia. PMH significant for T2DM, HTN, hypothyroidism, insomnia, MILLER, cirrhosis    Labs 4/15/2022 by PCP:  CBC: WBC 5.3, Hgb 11.6/MCV 92, platelets 76,800  CMP: Total bilirubin 1.4, alkaline phosphatase 136, AST 41  Hepatitis C Ab:  Nonreactive  TSH: 0.161  Vitamin D: 39.6  HgbA1c: 5.5%  Vitamin B12: 0    Helen has a documented history of MILLER in 2018. She states she was previously evaluated by gastroenterologist, Dr. Radha Machuca in [place], [place]. She was told she has a fatty liver, diet/exercise recommended. Helen was referred by her PCP to [place] [address] gastroenterology, evaluated by KATIA Polk on 5/13/2022. Liver ultrasound 6/21/2022 at [facility] documented preserved echogenicity of the liver, without evidence of discrete hepatic mass and dilated CBD post a cholecystectomy. The spleen was noted to be enlarged at 17.9 x 5.2 x 14.8 cm with a volume of 720.8 mL. Review of CBCs:    Platelets were 316,008 on 10/30/2021 per CBC from [facility] in [place], [place]    CBC 8/4/2022 (hematology consultation): WBC 3.63, Hgb 11.9/MCV 95.1, platelets 46,810. Normal differentials. Heeln denies NSAID use. She is a nondrinker. She bruises easily, though denies overt bleeding. TSH 8/2/2022: 32.26    Labs 8/4/2022:  LD: 259  Folate: 11.6  HIV: negative  Fibrosure: Fibrosis score F4-cirrhosis, necroinflammatory activity score: 0.18, A0-A1  Flow cytometry: No abnormal myeloid, B cell, T cell, or NK cell population identified  PBS: ERYTHROCYTES: normal count, normocytic normochromic, minimal   anisopoikilocytosis, mild polychromasia   WHITE BLOOD CELLS: decreased, morphologically unremarkable mature   neutrophils and lymphocytes   PLATELETS: decreased, normal size and granularity    Suspect leukopenia and thrombocytopenia related to splenomegaly and likely exacerbated by hypothyroidism. Regarding splenomegaly, suspect possible liver disease. No evidence to suggest lymphoma, peripheral blood smear negative  Will check JAK2 w/reflexes to r/o MPN  Will discuss with GI.     Past Medical History:   Diagnosis Date    Anxiety     Bruising     Diabetes mellitus ([facility] [address]     Edema     Fracture of nasal bone     History of cellulitis     History of constipation     medication induced    Hypertension     Non-alcoholic cirrhosis [facility])     SDH (subdural hematoma) ([facility] [address] 10/30/2021    small, post fall ([address], [address]    Sleep disorder     Splenomegaly     Thyroid disease      Past Surgical History:   Procedure Laterality Date    CHOLECYSTECTOMY  1978    COLONOSCOPY  2014    normal, per pt    HYSTERECTOMY (CERVIX STATUS UNKNOWN)  2000    SETH STEROTACTIC LOC BREAST BIOPSY RIGHT Right 04/27/2022    STEH STEROTACTIC LOC BREAST BIOPSY RIGHT 4/27/2022 [facility] [address]    OVARY REMOVAL Bilateral 2000    AGE 50       Current Outpatient Medications:     bumetanide (BUMEX) 0.5 MG tablet, Take 1 tablet by mouth in the morning., Disp: 90 tablet, Rfl: 1    vitamin D (ERGOCALCIFEROL) 1.25 MG (34414 UT) CAPS capsule, Take 1 capsule by mouth once a week, Disp: 12 capsule, Rfl: 1    losartan-hydroCHLOROthiazide (HYZAAR) 50-12.5 MG per tablet, Take 1 tablet by mouth daily, Disp: , Rfl:     levothyroxine (SYNTHROID) 200 MCG tablet, Take 200 mcg by mouth Daily, Disp: , Rfl:     b complex vitamins capsule, Take 1 capsule by mouth daily, Disp: , Rfl:     traZODone (DESYREL) 50 MG tablet, Take 50 mg by mouth nightly as needed for Sleep 1/2 tablet nightly as needed, Disp: , Rfl:    Allergies: No Known Allergies  Social History     Tobacco Use    Smoking status: Never    Smokeless tobacco: Never   Vaping Use    Vaping Use: Never used   Substance Use Topics    Alcohol use: Never    Drug use: Never     Family History   Problem Relation Age of Onset    Heart Disease Mother     Breast Cancer Maternal Aunt 67    Breast Cancer Maternal Aunt 74    Stomach Cancer Maternal Uncle 72        Great Uncle    Colon Polyps Neg Hx     Colon Cancer Neg Hx      Health Maintenance   Topic Date Due    Diabetic retinal exam  Never done    DTaP/Tdap/Td vaccine (1 - Tdap) Never done    Flu vaccine (1) Never done    Shingles vaccine (1 of 2) 04/11/2023 (Originally 5/10/1997)    Pneumococcal 65+ years Vaccine (1 - PCV) 04/15/2024 (Originally 5/10/1951)    COVID-19 Vaccine (3 - Booster for Watson Peter series) 08/12/2024 (Originally 2/2/2022)    Lipids  04/15/2023    Diabetic foot exam  07/11/2023    A1C test (Diabetic or Prediabetic)  07/11/2023    Depression Screen  08/08/2023    Annual Wellness Visit (AWV)  08/09/2023    Colorectal Cancer Screen  08/01/2032    DEXA (modify frequency per FRAX score)  Completed    Hepatitis C screen  Completed    Hepatitis A vaccine  Aged Out    Hib vaccine  Aged Out    Meningococcal (ACWY) vaccine  Aged Out     Subjective   Review of Systems   Constitutional:  Positive for fatigue. Negative for fever and unexpected weight change. HENT:  Negative for dental problem, hearing loss, mouth sores, nosebleeds, sore throat and trouble swallowing. History of broken nose twice in the last year   Eyes:  Negative for discharge and itching. Respiratory:  Negative for cough, shortness of breath and wheezing. Cardiovascular:  Negative for chest pain and palpitations. Gastrointestinal:  Positive for constipation. Negative for abdominal pain, diarrhea, nausea and vomiting. Endocrine: Positive for cold intolerance. Negative for heat intolerance. Genitourinary:  Negative for dysuria, frequency, hematuria and urgency. Musculoskeletal:  Negative for arthralgias, joint swelling and myalgias. Skin:  Negative for pallor and rash. Allergic/Immunologic: Negative for environmental allergies and immunocompromised state. Neurological:  Negative for seizures, syncope and numbness. Balance issues    Hematological:  Negative for adenopathy. Bruises/bleeds easily. Psychiatric/Behavioral:  Negative for agitation, behavioral problems and confusion. The patient is nervous/anxious. History of anxiety/depression   Objective   Physical Exam  Vitals reviewed. Constitutional:       General: She is not in acute distress. Appearance: She is well-developed. She is not toxic-appearing or diaphoretic. Comments: Wearing a facial mask.    HENT: Head: Normocephalic and atraumatic. Right Ear: External ear normal.      Left Ear: External ear normal.      Nose: Nose normal.      Mouth/Throat:      Mouth: Mucous membranes are moist.   Eyes:      General: No scleral icterus. Right eye: No discharge. Left eye: No discharge. Conjunctiva/sclera: Conjunctivae normal.   Neck:      Trachea: No tracheal deviation. Cardiovascular:      Rate and Rhythm: Normal rate and regular rhythm. Pulmonary:      Effort: Pulmonary effort is normal. No respiratory distress. Breath sounds: Normal breath sounds. No wheezing or rales. Chest:   Breasts:     Right: No supraclavicular adenopathy. Left: No supraclavicular adenopathy. Abdominal:      General: Bowel sounds are normal. There is no distension. Palpations: Abdomen is soft. There is splenomegaly. Tenderness: There is no abdominal tenderness. There is no guarding. Genitourinary:     Comments: Exam deferred  Musculoskeletal:         General: No tenderness or deformity. Cervical back: Neck supple. No muscular tenderness. Right lower leg: Edema (trace) present. Left lower leg: Edema (trace) present. Comments: Normal ROM all four extremities   Lymphadenopathy:      Cervical:      Right cervical: No superficial or deep cervical adenopathy. Left cervical: No superficial or deep cervical adenopathy. Upper Body:      Right upper body: No supraclavicular adenopathy. Left upper body: No supraclavicular adenopathy. Comments:      Skin:     General: Skin is warm and dry. Findings: Bruising (scattered BUE) present. No rash. Neurological:      Mental Status: She is alert and oriented to person, place, and time. Comments: follows commands, non-focal   Psychiatric:         Behavior: Behavior normal. Behavior is cooperative. Thought Content:  Thought content normal.         Judgment: Judgment normal.      Comments: Alert and oriented to person, place and time. BP (!) 154/84   Pulse 94   Wt 216 lb (98 kg)   SpO2 94%   BMI 35.94 kg/m²   Wt Readings from Last 3 Encounters:   22 216 lb (98 kg)   22 212 lb (96.2 kg)   22 212 lb (96.2 kg)     Labs reviewed by me:  CBC:   Lab Results   Component Value Date    WBC 3.66 (L) 2022    HGB 11.8 2022    HCT 36.4 2022    MCV 94.3 2022    PLT 72 (L) 2022     TSH 2022: 32.26    Labs 2022:  LD: 259  Folate: 11.6  HIV: negative  Fibrosure: Fibrosis score F4-cirrhosis, necroinflammatory activity score: 0.18, A0-A1  Flow cytometry: No abnormal myeloid, B cell, T cell, or NK cell population identified  PBS: ERYTHROCYTES: normal count, normocytic normochromic, minimal   anisopoikilocytosis, mild polychromasia   WHITE BLOOD CELLS: decreased, morphologically unremarkable mature   neutrophils and lymphocytes   PLATELETS: decreased, normal size and granularity    Labs 2022:  Platelet Antibodies: None Detected  JAK2: Requested, not completed  Immature Platelet Function: 3.2  Copper: 126.1  SPEP: Slight restriction within the polyclonal increase in the   gamma region. Immunofixation: FACUNDO gel shows a faint band in lambda   suggestive of a specific immune response or an early   monoclonal protein. Ig, IgA 336, IgM 241  Kappa light chains 61.20, Lambda light chains: 41.59, K/L ratio 1.47     ASSESSMENT/PLAN:    1. Leukopenia, unspecified type    2. Thrombocytopenia (HCC)    3. Splenomegaly    4.  MILLER (nonalcoholic steatohepatitis)        Leukopenia/Thrombocytopenia related to splenomegaly  Recent Labs     22  1008 22  1415 22  1408   WBC 3.66* 3.94* 3.63*   HGB 11.8 12.0 11.9   HCT 36.4 36.8 36.7   MCV 94.3 93.2 95.1*   PLT 72* 61* 56*     Labs 2022:  LD: 259  Folate: 11.6  HIV: negative  Fibrosure: Fibrosis score F4-cirrhosis, necroinflammatory activity score: 0.18, A0-A1  Flow cytometry: No abnormal myeloid, B cell, T cell, or NK cell population identified  PBS: ERYTHROCYTES: normal count, normocytic normochromic, minimal   anisopoikilocytosis, mild polychromasia   WHITE BLOOD CELLS: decreased, morphologically unremarkable mature   neutrophils and lymphocytes   PLATELETS: decreased, normal size and granularity    Suspect leukopenia and thrombocytopenia from splenomegaly secondary to possible liver disease  Could be exacerbated by significant hypothyroidism, currently being addressed by PCP  Patient denies NSAID use. Reiterated to avoid. Recommend bone marrow aspirate and biopsy. Discussed performing in clinic versus [facility] with [facility]. Patient is leaning toward having performed in clinic, though does not want completed today stating she is not mentally prepared. Verbal discussion and handouts provided regarding bone marrow aspirate and biopsy. Splenomegaly  Again, suspect related to possible liver disease  No evidence to suggest lymphoma, peripheral blood smear negative  Will request JAK2 w/reflexes to r/o MPN on Bone marrow aspirate and biopsy    MILLER (nonalcoholic steatohepatitis)  History of MILLER  FibroSure suggests fibrosis score of F4, cirrhosis  CMP w/ abn LFTs  Discussed with KATIA Anaya who feels FibroSure is a less sensitive test.  If concern for cirrhosis (given other negative findings; ultrasound liver, LFT, AFP, no varices etc.), liver biopsy would be recommended for definitive diagnosis    Hypothyroidism  TSH 32 on 8/2/2022, addressed by PCP  Currently taking Synthroid 200 mcg daily    Findings discussed with Dr. Steph Fajardo who recommended bone marrow aspirate and biopsy to rule out underlying bone marrow disorder. Consider referral back to GI or hepatology. No orders of the defined types were placed in this encounter. Tumor Screening:  Colonoscopy 2014; 10 yr recall per previous progress note from KATIA Anaya  Mammogram 4/14/2022 at [facility]: BI-RADS category 0.  Calcifications right breast mid depth 3:00 position  Mammogram Right breast 4/15/2022 at [facility]: BI-RADS category 4  Right Breast Biopsy 4/27/2022 at [facility] by Dr. Laurence Hussein: findings are benign. Recall recommended in 6 months (DUE 10/27/2022)    Return for follow up with KATIA Davis reschedule bone marrow with Miriam Franz in clinic 3 or 4 weeks . I have seen, examined and reviewed this patient medication list, appropriate labs and imaging studies. I reviewed relevant medical records and others physicians notes. I discussed the plans of care with the patient. I answered all the questions to the patients satisfaction. I have also reviewed the chief complaint (CC) and part of the history (History of Present Illness (HPI), Past Family Social History [facility]), or Review of Systems (ROS) and made changes when appropriated. (Please note that portions of this note were completed with a voice recognition program. Efforts were made to edit the dictations but occasionally words are mis-transcribed.)    The total time (20 min) I spent to see the patient today includes at least one or more of the following: preparing to see the patient by reviewing prior tests, prior notes or other relevant information, performing appropriate independent examination and evaluation, counseling, ordering of medications, tests or procedures, referrals, communicating with other healthcare professionals when appropriated to coordinate care, documenting clinic information in the electronic medical record or other health records, independently interpreting results of tests, managing test results and communicating the results to the patient/family or caregiver.           KATIA Junior  12:57 PM  9/1/2022

## 2022-09-01 ENCOUNTER — HOSPITAL ENCOUNTER (OUTPATIENT)
Dept: INFUSION THERAPY | Age: 77
Discharge: HOME | End: 2022-09-01
Payer: MEDICARE

## 2022-09-01 ENCOUNTER — OFFICE VISIT (OUTPATIENT)
Dept: HEMATOLOGY | Age: 77
End: 2022-09-01
Payer: MEDICARE

## 2022-09-01 VITALS
OXYGEN SATURATION: 94 % | BODY MASS INDEX: 35.94 KG/M2 | SYSTOLIC BLOOD PRESSURE: 154 MMHG | WEIGHT: 216 LBS | HEART RATE: 94 BPM | DIASTOLIC BLOOD PRESSURE: 84 MMHG

## 2022-09-01 DIAGNOSIS — D69.6 THROMBOCYTOPENIA (HCC): ICD-10-CM

## 2022-09-01 DIAGNOSIS — K75.81 NASH (NONALCOHOLIC STEATOHEPATITIS): ICD-10-CM

## 2022-09-01 DIAGNOSIS — R16.1 SPLENOMEGALY: ICD-10-CM

## 2022-09-01 DIAGNOSIS — D72.819 LEUKOPENIA, UNSPECIFIED TYPE: Primary | ICD-10-CM

## 2022-09-01 LAB
BACKGROUND, 480093: NORMAL
BACKGROUND, 489163: NORMAL
BASOPHILS ABSOLUTE: 0.02 K/UL (ref 0.01–0.08)
BASOPHILS RELATIVE PERCENT: 0.5 % (ref 0.1–1.2)
CALR MUTATION DETECTION RESULT, 489451: NORMAL
DIRECTOR REVIEW: NORMAL
EOSINOPHILS ABSOLUTE: 0.1 K/UL (ref 0.04–0.54)
EOSINOPHILS RELATIVE PERCENT: 2.7 % (ref 0.7–7)
EXTRACTION: NORMAL
HCT VFR BLD CALC: 36.4 % (ref 34.1–44.9)
HEMOGLOBIN: 11.8 G/DL (ref 11.2–15.7)
JAK2 EXONS 12-15 MUT DET PCR: NORMAL
JAK2 V617F MUTATION DETECTION 489201: NORMAL
LYMPHOCYTES ABSOLUTE: 1.09 K/UL (ref 1.18–3.74)
LYMPHOCYTES RELATIVE PERCENT: 29.8 % (ref 19.3–53.1)
Lab: NORMAL
MCH RBC QN AUTO: 30.6 PG (ref 25.6–32.2)
MCHC RBC AUTO-ENTMCNC: 32.4 G/DL (ref 32.3–35.5)
MCV RBC AUTO: 94.3 FL (ref 79.4–94.8)
METHOD: NORMAL
MONOCYTES ABSOLUTE: 0.25 K/UL (ref 0.24–0.82)
MONOCYTES RELATIVE PERCENT: 6.8 % (ref 4.7–12.5)
MPL MUTATION ANALYSIS RESULT: NORMAL
NEUTROPHILS ABSOLUTE: 2.2 K/UL (ref 1.56–6.13)
NEUTROPHILS RELATIVE PERCENT: 60.2 % (ref 34–71.1)
PDW BLD-RTO: 15.9 % (ref 11.7–14.4)
PLATELET # BLD: 72 K/UL (ref 182–369)
PMV BLD AUTO: 12.1 FL (ref 7.4–10.4)
RBC # BLD: 3.86 M/UL (ref 3.93–5.22)
REFERENCES: NORMAL
REFERENCES: NORMAL
REFLEX: NORMAL
WBC # BLD: 3.66 K/UL (ref 3.98–10.04)

## 2022-09-01 PROCEDURE — 1090F PRES/ABSN URINE INCON ASSESS: CPT | Performed by: NURSE PRACTITIONER

## 2022-09-01 PROCEDURE — G8399 PT W/DXA RESULTS DOCUMENT: HCPCS | Performed by: NURSE PRACTITIONER

## 2022-09-01 PROCEDURE — G8417 CALC BMI ABV UP PARAM F/U: HCPCS | Performed by: NURSE PRACTITIONER

## 2022-09-01 PROCEDURE — 85025 COMPLETE CBC W/AUTO DIFF WBC: CPT

## 2022-09-01 PROCEDURE — 1123F ACP DISCUSS/DSCN MKR DOCD: CPT | Performed by: NURSE PRACTITIONER

## 2022-09-01 PROCEDURE — 99213 OFFICE O/P EST LOW 20 MIN: CPT | Performed by: NURSE PRACTITIONER

## 2022-09-01 PROCEDURE — 99211 OFF/OP EST MAY X REQ PHY/QHP: CPT

## 2022-09-01 PROCEDURE — 3017F COLORECTAL CA SCREEN DOC REV: CPT | Performed by: NURSE PRACTITIONER

## 2022-09-01 PROCEDURE — 1036F TOBACCO NON-USER: CPT | Performed by: NURSE PRACTITIONER

## 2022-09-01 PROCEDURE — G8427 DOCREV CUR MEDS BY ELIG CLIN: HCPCS | Performed by: NURSE PRACTITIONER

## 2022-09-01 ASSESSMENT — ENCOUNTER SYMPTOMS
TROUBLE SWALLOWING: 0
CONSTIPATION: 1
WHEEZING: 0
NAUSEA: 0
COUGH: 0
VOMITING: 0
EYE DISCHARGE: 0
EYE ITCHING: 0
ABDOMINAL PAIN: 0
SORE THROAT: 0
DIARRHEA: 0
SHORTNESS OF BREATH: 0

## 2022-09-22 NOTE — TELEPHONE ENCOUNTER
Patient is needing refill for trazodone sent to [facility] at [facility].

## 2022-09-26 ENCOUNTER — HOSPITAL ENCOUNTER (OUTPATIENT)
Dept: INFUSION THERAPY | Age: 77
Discharge: HOME | End: 2022-09-26
Payer: MEDICARE

## 2022-09-26 ENCOUNTER — OFFICE VISIT (OUTPATIENT)
Dept: HEMATOLOGY | Age: 77
End: 2022-09-26
Payer: MEDICARE

## 2022-09-26 VITALS
DIASTOLIC BLOOD PRESSURE: 78 MMHG | SYSTOLIC BLOOD PRESSURE: 142 MMHG | OXYGEN SATURATION: 98 % | BODY MASS INDEX: 35.86 KG/M2 | HEART RATE: 97 BPM | WEIGHT: 215.5 LBS

## 2022-09-26 DIAGNOSIS — D69.6 THROMBOCYTOPENIA (HCC): ICD-10-CM

## 2022-09-26 DIAGNOSIS — R16.1 SPLENOMEGALY: ICD-10-CM

## 2022-09-26 DIAGNOSIS — D72.819 LEUKOPENIA, UNSPECIFIED TYPE: Primary | ICD-10-CM

## 2022-09-26 DIAGNOSIS — K75.81 NASH (NONALCOHOLIC STEATOHEPATITIS): ICD-10-CM

## 2022-09-26 LAB
HCT VFR BLD CALC: 34.4 % (ref 34.1–44.9)
HEMOGLOBIN: 11.7 G/DL (ref 11.2–15.7)
LYMPHOCYTES ABSOLUTE: 1.24 K/UL (ref 1.18–3.74)
LYMPHOCYTES RELATIVE PERCENT: 29.2 % (ref 19.3–53.1)
MCH RBC QN AUTO: 30.6 PG (ref 25.6–32.2)
MCHC RBC AUTO-ENTMCNC: 34 G/DL (ref 32.3–35.5)
MCV RBC AUTO: 90.1 FL (ref 79.4–94.8)
MONOCYTES ABSOLUTE: 0.27 K/UL (ref 0.24–0.82)
MONOCYTES RELATIVE PERCENT: 6.4 % (ref 4.7–12.5)
NEUTROPHILS ABSOLUTE: 2.57 K/UL (ref 1.56–6.13)
NEUTROPHILS RELATIVE PERCENT: 60.6 % (ref 34–71.1)
PDW BLD-RTO: 15.8 % (ref 11.7–14.4)
PLATELET # BLD: 70 K/UL (ref 182–369)
PMV BLD AUTO: 10 FL (ref 7.4–10.4)
RBC # BLD: 3.82 M/UL (ref 3.93–5.22)
WBC # BLD: 4.24 K/UL (ref 3.98–10.04)

## 2022-09-26 PROCEDURE — 2500000003 HC RX 250 WO HCPCS: Performed by: NURSE PRACTITIONER

## 2022-09-26 PROCEDURE — 88342 IMHCHEM/IMCYTCHM 1ST ANTB: CPT

## 2022-09-26 PROCEDURE — 88341 IMHCHEM/IMCYTCHM EA ADD ANTB: CPT

## 2022-09-26 PROCEDURE — 88185 FLOWCYTOMETRY/TC ADD-ON: CPT

## 2022-09-26 PROCEDURE — 88305 TISSUE EXAM BY PATHOLOGIST: CPT

## 2022-09-26 PROCEDURE — 38222 DX BONE MARROW BX & ASPIR: CPT | Performed by: NURSE PRACTITIONER

## 2022-09-26 PROCEDURE — 88311 DECALCIFY TISSUE: CPT

## 2022-09-26 PROCEDURE — 88184 FLOWCYTOMETRY/ TC 1 MARKER: CPT

## 2022-09-26 PROCEDURE — 88313 SPECIAL STAINS GROUP 2: CPT

## 2022-09-26 PROCEDURE — 99212 OFFICE O/P EST SF 10 MIN: CPT

## 2022-09-26 PROCEDURE — 99999 PR OFFICE/OUTPT VISIT,PROCEDURE ONLY: CPT | Performed by: NURSE PRACTITIONER

## 2022-09-26 PROCEDURE — 85025 COMPLETE CBC W/AUTO DIFF WBC: CPT

## 2022-09-26 RX ORDER — LIDOCAINE HYDROCHLORIDE 20 MG/ML
5 INJECTION, SOLUTION INFILTRATION; PERINEURAL ONCE
Status: COMPLETED | OUTPATIENT
Start: 2022-09-26 | End: 2022-09-26

## 2022-09-26 RX ORDER — TRAZODONE HYDROCHLORIDE 50 MG/1
50 TABLET ORAL NIGHTLY PRN
Qty: 30 TABLET | Refills: 2 | Status: SHIPPED | OUTPATIENT
Start: 2022-09-26

## 2022-09-26 RX ADMIN — LIDOCAINE HYDROCHLORIDE 5 ML: 20 INJECTION, SOLUTION INFILTRATION; PERINEURAL at 14:20

## 2022-09-26 NOTE — PROGRESS NOTES
Pt Name: Esteban Dozier  YOB: 1838  MRN: [id number]    Date of procedure: 9/26/2022    Procedure Note:  Bone Marrow Aspirate and Biopsy    Indication:  Leukopenia/thrombocytopenia (splenemegaly, ? Liver disease)    Site: Right iliac crest    Informed consent was signed by Esteban Dozier. Time out was taken. Esteban Dozier was placed in the left lateral decubitus position. Esteban Dozier was prepped and draped in sterile fashion. 10 mL of 2% Lidocaine was used for local anesthetic of the skin and right periosteum. A bone marrow aspirate and biopsy was obtained and sent for surgical pathology review, flow cytometry, and chromosome analysis. JAK2 with reflexes will be requested due to splenomegaly. The total blood loss was less than 3 mL. The skin was cleaned and a sterile bandage was placed on the entrance site. The patient tolerated the procedure without complication. Keep scheduled appointment in clinic in 2 weeks to review results.     KATIA Pantoja    09/26/22  2:45 PM

## 2022-10-10 NOTE — PROGRESS NOTES
OP HEMATOLOGY/ONCOLOGY PROGRESS NOTE      Pt Name: Altagracia Sibley  Birthdate: [birth year]  MRN: [id number]  Referring provider: KATIA Rosario  PCP: KATIA Hudson  Date of evaluation: 10/11/2022    History Obtained From:  patient, electronic medical record    CHIEF COMPLAINT:    Chief Complaint   Patient presents with    Follow-up     Leukopenia, unspecified type     HISTORY OF PRESENT ILLNESS:    Claudia Hector \"Helen\" Arnaldo Davis is a 78 y.o.  female with a history of leukopenia and thrombocytopenia felt related to splenomegaly from liver disease. As you recall, Vinicius Monae was previously evaluated by [facility] in [facility] in 2019. Medical record indicated a history of cirrhosis with hepatosplenomegaly and changes of portal hypertension including gastric varices noted on CT scan dated 9/21/2018. Endoscopy 11/2019 was normal.  No varices. Helen has established care with KATIA Temple with [facility]. Vinicius Monae returns to the clinic to discuss findings on bone marrow aspirate and biopsy for further evaluation of leukopenia and thrombocytopenia. Helen reports easy bruising. She denies bleeding issues. She has no B symptoms. She denies recurrent infections. CBC today includes a WBC of 4.11, Hgb 11.6/MCV 94.4, platelets 12,609. HEMATOLOGY HISTORY: Thrombocytopenia  Claudia Hecotr \"Helen\" Arnaldo Davis was seen in hematology consultation 8/4/2022, referred by KATIA Hudson for evaluation of thrombocytopenia. PMH significant for T2DM, HTN, hypothyroidism, insomnia, MILLER, cirrhosis    Labs 4/15/2022 by PCP:  CBC: WBC 5.3, Hgb 11.6/MCV 92, platelets 99,642  CMP: Total bilirubin 1.4, alkaline phosphatase 136, AST 41  Hepatitis C Ab: Nonreactive  TSH: 0.161  Vitamin D: 39.6  HgbA1c: 5.5%  Vitamin B12: 0    Helen has a documented history of MILLER in 2018. She states she was previously evaluated by gastroenterologist, Dr. Jacob Martell in [place], [place].   She was told she has a fatty liver, diet/exercise recommended. Helen was referred by her PCP to [facility], evaluated by KATIA Fuentes on 2022. Liver ultrasound 2022 at [facility] documented preserved echogenicity of the liver, without evidence of discrete hepatic mass and dilated CBD post a cholecystectomy. The spleen was noted to be enlarged at 17.9 x 5.2 x 14.8 cm with a volume of 720.8 mL. Review of CBCs:    Platelets were 992,637 on 10/30/2021 per CBC from [facility] in [place], [place]    CBC 2022 (hematology consultation): WBC 3.63, Hgb 11.9/MCV 95.1, platelets 32,785. Normal differentials. Helen denies NSAID use. She is a nondrinker. She bruises easily, though denies overt bleeding. TSH 2022: 32.26    Labs 2022:  LD: 259  Folate: 11.6  HIV: negative  Fibrosure: Fibrosis score F4-cirrhosis, necroinflammatory activity score: 0.18, A0-A1  Flow cytometry: No abnormal myeloid, B cell, T cell, or NK cell population identified  PBS: ERYTHROCYTES: normal count, normocytic normochromic, minimal   anisopoikilocytosis, mild polychromasia   WHITE BLOOD CELLS: decreased, morphologically unremarkable mature   neutrophils and lymphocytes   PLATELETS: decreased, normal size and granularity    Suspect leukopenia and thrombocytopenia related to splenomegaly and possibly exacerbated by hypothyroidism. Regarding splenomegaly, suspect possible liver disease. No evidence to suggest lymphoma, peripheral blood smear negative    Labs 2022:  Platelet Antibodies: None Detected  JAK2: Requested, not completed  Immature Platelet Function: 3.2  Copper: 126.1  SPEP: Slight restriction within the polyclonal increase in the   gamma region. Immunofixation: FACUNDO gel shows a faint band in lambda   suggestive of a specific immune response or an early   monoclonal protein.   Ig, IgA 336, IgM 241  Kappa light chains 61.20, Lambda light chains: 41.59, K/L ratio 1.47    Bone Marrow Biopsy done on 22:   Mildly hypercellular for age (55-65% cellular) with normal trilineage hematopoiesis, adequate number of megakaryocytes with unremarkable morphology  No overt dysplasia. No increase in blasts. Stainable storage iron present with no ring sideroblasts  FLOW cytometry: Unremarkable  FISH: Negative for MDS  Cytogenetics: Normal female karyotype  JAK2 mutation: Negative  Thrombocytopenia along with adequate number of megakaryocytes can be seen in peripheral destruction/sequestration of platelets.       Past Medical History:   Diagnosis Date    Anxiety     Bruising     Diabetes mellitus ([facility] [address]     Edema     Fracture of nasal bone     History of cellulitis     History of constipation     medication induced    Hypertension     Non-alcoholic cirrhosis ([facility] [address]     SDH (subdural hematoma) 10/30/2021    small, post fall ([address], [address]    Sleep disorder     Splenomegaly     Thyroid disease      Past Surgical History:   Procedure Laterality Date    CHOLECYSTECTOMY  1978    COLONOSCOPY  2014    normal, per pt    HYSTERECTOMY ([facility])  2000    SETH STEROTACTIC LOC BREAST BIOPSY RIGHT Right 04/27/2022    SETH STEROTACTIC LOC BREAST BIOPSY RIGHT 4/27/2022 [facility] [address]    OVARY REMOVAL Bilateral 2000    AGE 55       Current Outpatient Medications:     traZODone (DESYREL) 50 MG tablet, Take 1 tablet by mouth nightly as needed for Sleep 1/2 tablet nightly as needed Take 50 mg by mouth nightly as needed for Sleep 1/2 tablet nightly as needed, Disp: 30 tablet, Rfl: 2    bumetanide (BUMEX) 0.5 MG tablet, Take 1 tablet by mouth in the morning., Disp: 90 tablet, Rfl: 1    vitamin D (ERGOCALCIFEROL) 1.25 MG (28155 UT) CAPS capsule, Take 1 capsule by mouth once a week, Disp: 12 capsule, Rfl: 1    losartan-hydroCHLOROthiazide (HYZAAR) 50-12.5 MG per tablet, Take 1 tablet by mouth daily, Disp: , Rfl:     levothyroxine (SYNTHROID) 200 MCG tablet, Take 200 mcg by mouth Daily, Disp: , Rfl:     b complex vitamins capsule, Take 1 capsule by mouth daily, Disp: , Rfl:    Allergies: No Known Allergies  Social History     Tobacco Use    Smoking status: Never    Smokeless tobacco: Never   Vaping Use    Vaping Use: Never used   Substance Use Topics    Alcohol use: Never    Drug use: Never     Family History   Problem Relation Age of Onset    Heart Disease Mother     Breast Cancer Maternal Aunt 67    Breast Cancer Maternal Aunt 76    Stomach Cancer Maternal Uncle 72        Great Uncle    Colon Polyps Neg Hx     Colon Cancer Neg Hx      Health Maintenance   Topic Date Due    Diabetic retinal exam  Never done    DTaP/Tdap/Td vaccine (1 - Tdap) Never done    Flu vaccine (1) Never done    Shingles vaccine (1 of 2) 04/11/2023 (Originally 5/10/1997)    Pneumococcal 65+ years Vaccine (1 - PCV) 04/15/2024 (Originally 5/10/1951)    COVID-19 Vaccine (4 - Booster for [employer] series) 08/12/2024 (Originally 1/23/2022)    Lipids  04/15/2023    Diabetic foot exam  07/11/2023    A1C test (Diabetic or Prediabetic)  07/11/2023    Depression Screen  08/08/2023    Annual Wellness Visit (AWV)  08/09/2023    Colorectal Cancer Screen  08/01/2032    DEXA (modify frequency per FRAX score)  Completed    Hepatitis C screen  Completed    Hepatitis A vaccine  Aged Out    Hib vaccine  Aged Out    Meningococcal (ACWY) vaccine  Aged Out     Subjective   Review of Systems   Constitutional:  Positive for fatigue. Negative for fever and unexpected weight change. HENT:  Negative for dental problem, hearing loss, mouth sores, nosebleeds, sore throat and trouble swallowing. Eyes:  Negative for discharge and itching. Respiratory:  Negative for cough, shortness of breath and wheezing. Cardiovascular:  Negative for chest pain and palpitations. Gastrointestinal:  Positive for constipation. Negative for abdominal pain, diarrhea, nausea and vomiting. Endocrine: Positive for cold intolerance. Negative for heat intolerance.    Genitourinary:  Negative for dysuria, frequency, hematuria and urgency. Musculoskeletal:  Negative for arthralgias, joint swelling and myalgias. Skin:  Negative for pallor and rash. Allergic/Immunologic: Negative for environmental allergies and immunocompromised state. Neurological:  Negative for seizures, syncope and numbness. Balance issues    Hematological:  Negative for adenopathy. Bruises/bleeds easily. Psychiatric/Behavioral:  Negative for agitation, behavioral problems and confusion. The patient is nervous/anxious. History of anxiety/depression   Objective   Physical Exam  Vitals reviewed. Constitutional:       General: She is not in acute distress. Appearance: She is well-developed. She is not toxic-appearing or diaphoretic. Comments: Wearing a facial mask. HENT:      Head: Normocephalic and atraumatic. Right Ear: External ear normal.      Left Ear: External ear normal.      Nose: Nose normal.      Mouth/Throat:      Mouth: Mucous membranes are moist.   Eyes:      General: No scleral icterus. Right eye: No discharge. Left eye: No discharge. Conjunctiva/sclera: Conjunctivae normal.   Neck:      Trachea: No tracheal deviation. Cardiovascular:      Rate and Rhythm: Normal rate and regular rhythm. Pulmonary:      Effort: Pulmonary effort is normal. No respiratory distress. Breath sounds: Normal breath sounds. No wheezing or rales. Abdominal:      General: Bowel sounds are normal. There is no distension. Palpations: Abdomen is soft. There is splenomegaly. Tenderness: There is no abdominal tenderness. There is no guarding. Genitourinary:     Comments: Exam deferred  Musculoskeletal:         General: No tenderness or deformity. Cervical back: Neck supple. No muscular tenderness. Right lower leg: Edema (trace) present. Left lower leg: Edema (trace) present.       Comments: Normal ROM all four extremities   Lymphadenopathy:      Cervical:      Right cervical: No superficial or deep cervical adenopathy. Left cervical: No superficial or deep cervical adenopathy. Upper Body:      Right upper body: No supraclavicular adenopathy. Left upper body: No supraclavicular adenopathy. Comments:      Skin:     General: Skin is warm and dry. Findings: Bruising (scattered BUE) present. No rash. Neurological:      Mental Status: She is alert and oriented to person, place, and time. Comments: follows commands, non-focal   Psychiatric:         Behavior: Behavior normal. Behavior is cooperative. Thought Content: Thought content normal.         Judgment: Judgment normal.      Comments: Alert and oriented to person, place and time. BP (!) 142/64   Pulse 95   Ht 5' 5\" (1.651 m)   Wt 213 lb (96.6 kg)   SpO2 99%   BMI 35.45 kg/m²   Wt Readings from Last 3 Encounters:   10/11/22 213 lb (96.6 kg)   09/26/22 215 lb 8 oz (97.8 kg)   09/01/22 216 lb (98 kg)     Labs reviewed by me:  CBC:   Lab Results   Component Value Date    WBC 4.11 10/11/2022    HGB 11.6 10/11/2022    HCT 35.4 10/11/2022    MCV 94.4 10/11/2022    PLT 66 (L) 10/11/2022     ASSESSMENT/PLAN:    1. Leukopenia, unspecified type    2. Thrombocytopenia (HCC)    3. Splenomegaly    4. MILLER (nonalcoholic steatohepatitis)    5. Hypothyroidism (acquired)    6. Follow-up examination of abnormal mammogram    7. History of right breast biopsy    8.  Encounter for screening mammogram for malignant neoplasm of breast       Leukopenia/Thrombocytopenia related to splenomegaly  Recent Labs     10/11/22  1356 09/26/22  1503   WBC 4.11 4.24   HGB 11.6 11.7   HCT 35.4 34.4   MCV 94.4 90.1   PLT 66* 70*     Suspect leukopenia and thrombocytopenia from splenomegaly secondary to possible liver disease  Could be exacerbated by significant hypothyroidism, currently being addressed by PCP  Avoid NSAID use  BMAB negative, as noted above    Splenomegaly  Again, suspect related to possible liver disease  No evidence to suggest lymphoma, peripheral blood smear negative, BMAB negative  JAK2 negative; no MPN    MILLER (nonalcoholic steatohepatitis)  History of MILLER  FibroSure suggests fibrosis score of F4, cirrhosis  CMP w/ abn LFTs  Discussed with KATIA Naidu who feels FibroSure is a less sensitive test.  If concern for cirrhosis (given other negative findings; ultrasound liver, LFT, AFP, no varices etc.), liver biopsy would be recommended for definitive diagnosis. Defer to GI if felt warranted. Discussed with patient, recommend continued follow-up with GI. Hypothyroidism  TSH 32 on 8/2/2022, addressed by PCP  Currently taking Synthroid 200 mcg daily    History of right breast biopsy  History of abnormal right mammogram  Orders Placed This Encounter   Procedures    SETH DIGITAL DIAGNOSTIC UNILATERAL RIGHT     Tumor Screening:  Colonoscopy 2014; 10 yr recall per previous progress note from KATIA Naidu  Mammogram 4/14/2022 at [facility]: BI-RADS category 0. Calcifications right breast mid depth 3:00 position  Mammogram Right breast 4/15/2022 at [facility]: BI-RADS category 4  Right Breast Biopsy 4/27/2022 at [facility] by Dr. Medardo Miranda: findings are benign. Recall recommended in 6 months (DUE 10/27/2022)    Return in about 1 year (around 10/11/2023) for follow up with KATIA Grider. I have seen, examined and reviewed this patient medication list, appropriate labs and imaging studies. I reviewed relevant medical records and others physicians notes. I discussed the plans of care with the patient. I answered all the questions to the patients satisfaction. I have also reviewed the chief complaint (CC) and part of the history (History of Present Illness (HPI), Past Family Social History [facility]), or Review of Systems (ROS) and made changes when appropriated.         (Please note that portions of this note were completed with a voice recognition program. Efforts were made to edit the dictations but occasionally words are mis-transcribed.)    The total time (32 min) I spent to see the patient today includes at least one or more of the following: preparing to see the patient by reviewing prior tests, prior notes or other relevant information, performing appropriate independent examination and evaluation, counseling, ordering of medications, tests or procedures, referrals, communicating with other healthcare professionals when appropriated to coordinate care, documenting clinic information in the electronic medical record or other health records, independently interpreting results of tests, managing test results and communicating the results to the patient/family or caregiver.           Hardeep Carbajal, KATIA  2:41 PM  10/11/2022

## 2022-10-11 ENCOUNTER — OFFICE VISIT (OUTPATIENT)
Dept: HEMATOLOGY | Age: 77
End: 2022-10-11
Payer: MEDICARE

## 2022-10-11 ENCOUNTER — HOSPITAL ENCOUNTER (OUTPATIENT)
Dept: INFUSION THERAPY | Age: 77
Discharge: HOME | End: 2022-10-11
Payer: MEDICARE

## 2022-10-11 VITALS
OXYGEN SATURATION: 99 % | SYSTOLIC BLOOD PRESSURE: 142 MMHG | HEART RATE: 95 BPM | HEIGHT: 65 IN | DIASTOLIC BLOOD PRESSURE: 64 MMHG | WEIGHT: 213 LBS | BODY MASS INDEX: 35.49 KG/M2

## 2022-10-11 DIAGNOSIS — K75.81 NASH (NONALCOHOLIC STEATOHEPATITIS): ICD-10-CM

## 2022-10-11 DIAGNOSIS — D69.6 THROMBOCYTOPENIA (HCC): ICD-10-CM

## 2022-10-11 DIAGNOSIS — R92.8 FOLLOW-UP EXAMINATION OF ABNORMAL MAMMOGRAM: ICD-10-CM

## 2022-10-11 DIAGNOSIS — E03.9 HYPOTHYROIDISM (ACQUIRED): ICD-10-CM

## 2022-10-11 DIAGNOSIS — Z12.31 ENCOUNTER FOR SCREENING MAMMOGRAM FOR MALIGNANT NEOPLASM OF BREAST: ICD-10-CM

## 2022-10-11 DIAGNOSIS — R16.1 SPLENOMEGALY: ICD-10-CM

## 2022-10-11 DIAGNOSIS — Z98.890 HISTORY OF RIGHT BREAST BIOPSY: ICD-10-CM

## 2022-10-11 DIAGNOSIS — D72.819 LEUKOPENIA, UNSPECIFIED TYPE: Primary | ICD-10-CM

## 2022-10-11 LAB
BASOPHILS ABSOLUTE: 0.02 K/UL (ref 0.01–0.08)
BASOPHILS RELATIVE PERCENT: 0.5 % (ref 0.1–1.2)
EOSINOPHILS ABSOLUTE: 0.1 K/UL (ref 0.04–0.54)
EOSINOPHILS RELATIVE PERCENT: 2.4 % (ref 0.7–7)
HCT VFR BLD CALC: 35.4 % (ref 34.1–44.9)
HEMOGLOBIN: 11.6 G/DL (ref 11.2–15.7)
LYMPHOCYTES ABSOLUTE: 1.15 K/UL (ref 1.18–3.74)
LYMPHOCYTES RELATIVE PERCENT: 28 % (ref 19.3–53.1)
MCH RBC QN AUTO: 30.9 PG (ref 25.6–32.2)
MCHC RBC AUTO-ENTMCNC: 32.8 G/DL (ref 32.3–35.5)
MCV RBC AUTO: 94.4 FL (ref 79.4–94.8)
MONOCYTES ABSOLUTE: 0.26 K/UL (ref 0.24–0.82)
MONOCYTES RELATIVE PERCENT: 6.3 % (ref 4.7–12.5)
NEUTROPHILS ABSOLUTE: 2.57 K/UL (ref 1.56–6.13)
NEUTROPHILS RELATIVE PERCENT: 62.6 % (ref 34–71.1)
PDW BLD-RTO: 16.2 % (ref 11.7–14.4)
PLATELET # BLD: 66 K/UL (ref 182–369)
PMV BLD AUTO: 10.4 FL (ref 7.4–10.4)
RBC # BLD: 3.75 M/UL (ref 3.93–5.22)
WBC # BLD: 4.11 K/UL (ref 3.98–10.04)

## 2022-10-11 PROCEDURE — G8399 PT W/DXA RESULTS DOCUMENT: HCPCS | Performed by: NURSE PRACTITIONER

## 2022-10-11 PROCEDURE — 99212 OFFICE O/P EST SF 10 MIN: CPT

## 2022-10-11 PROCEDURE — 1036F TOBACCO NON-USER: CPT | Performed by: NURSE PRACTITIONER

## 2022-10-11 PROCEDURE — 1123F ACP DISCUSS/DSCN MKR DOCD: CPT | Performed by: NURSE PRACTITIONER

## 2022-10-11 PROCEDURE — 36415 COLL VENOUS BLD VENIPUNCTURE: CPT

## 2022-10-11 PROCEDURE — 3017F COLORECTAL CA SCREEN DOC REV: CPT | Performed by: NURSE PRACTITIONER

## 2022-10-11 PROCEDURE — 99214 OFFICE O/P EST MOD 30 MIN: CPT | Performed by: NURSE PRACTITIONER

## 2022-10-11 PROCEDURE — G8427 DOCREV CUR MEDS BY ELIG CLIN: HCPCS | Performed by: NURSE PRACTITIONER

## 2022-10-11 PROCEDURE — G8417 CALC BMI ABV UP PARAM F/U: HCPCS | Performed by: NURSE PRACTITIONER

## 2022-10-11 PROCEDURE — G8484 FLU IMMUNIZE NO ADMIN: HCPCS | Performed by: NURSE PRACTITIONER

## 2022-10-11 PROCEDURE — 85025 COMPLETE CBC W/AUTO DIFF WBC: CPT

## 2022-10-11 PROCEDURE — 1090F PRES/ABSN URINE INCON ASSESS: CPT | Performed by: NURSE PRACTITIONER

## 2022-10-11 ASSESSMENT — ENCOUNTER SYMPTOMS
SHORTNESS OF BREATH: 0
NAUSEA: 0
COUGH: 0
CONSTIPATION: 1
EYE DISCHARGE: 0
SORE THROAT: 0
VOMITING: 0
TROUBLE SWALLOWING: 0
DIARRHEA: 0
EYE ITCHING: 0
ABDOMINAL PAIN: 0
WHEEZING: 0

## 2022-10-12 ENCOUNTER — TELEPHONE (OUTPATIENT)
Dept: HEMATOLOGY | Age: 77
End: 2022-10-12

## 2022-10-12 NOTE — TELEPHONE ENCOUNTER
Attempted to contact patient no answer no voicemail ( ph: [phone]) to provide appointment for mammogram

## 2022-10-18 RX ORDER — ERGOCALCIFEROL 1.25 MG/1
CAPSULE ORAL
Qty: 12 CAPSULE | Refills: 1 | Status: SHIPPED | OUTPATIENT
Start: 2022-10-18

## 2022-10-18 NOTE — TELEPHONE ENCOUNTER
[facility] called requesting a refill of the below medication which has been pended for you:     Requested Prescriptions     Pending Prescriptions Disp Refills    vitamin D (ERGOCALCIFEROL) 1.25 MG (80428 UT) CAPS capsule [Pharmacy Med Name: VITAMIN D2 1.25MG(50,000 UNIT)] 12 capsule 1     Sig: TAKE 1 CAPSULE BY MOUTH ONE TIME PER WEEK       Last Appointment Date: 8/8/2022  Next Appointment Date: 2/8/2023    No Known Allergies

## 2022-11-14 RX ORDER — HYDROCHLOROTHIAZIDE 12.5 MG/1
CAPSULE, GELATIN COATED ORAL
Qty: 30 CAPSULE | Refills: 3 | Status: SHIPPED | OUTPATIENT
Start: 2022-11-14 | End: 2022-11-15 | Stop reason: SDUPTHER

## 2022-11-14 NOTE — TELEPHONE ENCOUNTER
[facility]    Hematology/Oncology Established Patient Note      Today's Date: 7/19/18    Reason for Follow-up: CLL      HISTORY OF PRESENT ILLNESS: Austin Garza is a 77 year old male with PMHx of DMII, HTN who presented with leukocytosis.  In November 2017, he was found to have elevated WBC of 61.7K, hemoglobin of 10.4, and platelet of 115K.  There were no other CBC results available between 2010 and 2017.  Prior to 2010, he had normal CBC, with normal to mild anemia, and mild thrombocytopenia.   He was asymptomatic.    He underwent bone marrow biopsy on 11/21/17, which was consistent with chronic lymphocytic leukemia/small lymphocytic lymphoma, with 13% ringed sideroblasts identified.  The presence of increased numbers of ringed sideroblasts raises the possibility of an associated myelodysplastic syndrome.  Dysplastic changes are not identified though.  Many cases exhibiting ringed sideroblasts are metabolic origin, without significant risk of progression to acute leukemia, and these typically do not show dysplastic morphology as is the case with this specimen.  15% ringed sideroblasts are required for a diagnosis for RARS, which this specimen does not meet.  Flow cytometry is also consistent with CLL/SLL.  Cytogenetics show two (10%) of the metaphase cells comprise a clone characterized by a deletion within the proximal long arm of a chromosome 13 as the sole karyotypic abnormality.  Three (15%) metaphases had loss of the Y chromosome as the sole abnormality.    CT scan on 11/29/17 shows mildly enlarged left axillary lymph node and periaortic lymph nodes in the retroperitoneum of the abdomen.  Spleen is also enlarged.    On his staging CT scan for CLL, he was incidentally found to have a large infrarenal abdominal aortic aneurysm, measuring 7.6 cm.  He was seen by Dr. Thomas, and he underwent EVAR on 12/4/17.       INTERIM HISTORY: Austin is here for follow-up today. He has been doing  Requested Prescriptions     Pending Prescriptions Disp Refills    hydroCHLOROthiazide (MICROZIDE) 12.5 MG capsule [Pharmacy Med Name: HYDROCHLOROTHIAZIDE 12.5 MG CP] 30 capsule 3     Sig: TAKE 1 CAPSULE BY MOUTH EVERY DAY well.  He goes up to his boat on [address] every weekend in the summer.  He notes fatigue and mild night sweats, but unchanged over the last 6 months.  He denies new lumps/bumps.      REVIEW OF SYSTEMS:   14 point ROS was reviewed and is negative other than as noted above in HPI.       HOME MEDICATIONS:  Current Outpatient Prescriptions   Medication Sig Dispense Refill     aspirin 325 MG tablet Take 1 tablet (325 mg) by mouth every evening 90 tablet 3     Atorvastatin Calcium (LIPITOR PO) Take 20 mg by mouth At Bedtime       blood glucose monitoring (ONE TOUCH ULTRA) test strip Test 2-3 times daily as directed, brand per insurance formulary. 100 each 0     Blood Glucose Monitoring Suppl (ONE TOUCH ULTRA 2) W/DEVICE KIT Use to test blood sugars 2-3 times daily as directed. Brand per insurance formulary   1 kit 0     fluticasone (FLONASE) 50 MCG/ACT spray Spray 2 sprays into both nostrils 2 times daily 1 Bottle 11     glimepiride (AMARYL) 1 MG tablet Take 1 tablet (1 mg) by mouth every morning (before breakfast) 90 tablet 3     losartan-hydrochlorothiazide (HYZAAR) 50-12.5 MG per tablet Take 1 tablet by mouth daily 90 tablet 3     metFORMIN (GLUCOPHAGE) 1000 MG tablet Take 1 tablet (1,000 mg) by mouth 2 times daily (with meals) 180 tablet 3     Multiple Vitamins-Minerals (CENTRUM SILVER) per tablet Take 1 tablet by mouth daily           ALLERGIES:  Allergies   Allergen Reactions     Ace Inhibitors      cough         PAST MEDICAL HISTORY:  Past Medical History:   Diagnosis Date     Diaphragmatic hernia without mention of obstruction or gangrene      Diverticulitis of colon (without mention of hemorrhage)(562.11)      Esophageal reflux      Irritable bowel syndrome      Macular degeneration (senile) of retina, unspecified      Squamous Cell Carcinoma, Back 1988     Type II or unspecified type diabetes mellitus without mention of complication, not stated as uncontrolled      Unspecified essential hypertension           PAST SURGICAL HISTORY:  Past Surgical History:   Procedure Laterality Date     APPENDECTOMY  1966     BONE MARROW BIOPSY, BONE SPECIMEN, NEEDLE/TROCAR N/A 2017    Procedure: BIOPSY BONE MARROW;  BONE MARROW BIOPSY;  Surgeon: Shady Garcia MD;  Location:  GI     COLONOSCOPY       ENDOVASCULAR REPAIR ANEURYSM ABDOMINAL AORTA N/A 2017    Procedure: ENDOVASCULAR REPAIR ANEURYSM ABDOMINAL AORTA;  ENDOVASCULAR REPAIR ANEURYSM ABDOMINAL AORTA;  Surgeon: Milton Cazares MD;  Location:  OR     SURGICAL HISTORY OF -       Squamous cell skin cancer resection - back     SURGICAL HISTORY OF -       skin tags resection     SURGICAL HISTORY OF -   2001    stress thall. normal     SURGICAL HISTORY OF -   2002    colonoscopy     SURGICAL HISTORY OF -       Fistulotomy with marsupialization for repair of fisture in ano         SOCIAL HISTORY:  Social History     Social History     Marital status:      Spouse name: librado     Number of children: 0     Years of education: 17     Occupational History     retired      Social History Main Topics     Smoking status: Former Smoker     Packs/day: 0.50     Years: 20.00     Quit date: 1975     Smokeless tobacco: Former User     Alcohol use 6.0 - 8.4 oz/week     10 - 14 Standard drinks or equivalent per week      Comment: 2 drinks a week     Drug use: No     Sexual activity: No     Other Topics Concern     Not on file     Social History Narrative   He quit smoking in .  He drinks 1-3 glasses of wine a night with dinner.  He is retired, and used to work as a .  He lives with his wife in [place].  His cousin had lung cancer and  around age 69.  Otherwise, he denies family history of cancer.        FAMILY HISTORY:  Family History   Problem Relation Age of Onset     Cerebrovascular Disease Father      x 2     Hypertension Mother      C.A.D. Mother      Cancer Mother      skin     Eye Disorder Mother      glaucoma      "Prostate Cancer No family hx of      Cancer - colorectal No family hx of          PHYSICAL EXAM:  Vital signs:  /78  Pulse 75  Temp 97.5  F (36.4  C) (Tympanic)  Resp 16  Ht 1.727 m (5' 8\")  Wt 98 kg (216 lb)  SpO2 98%  BMI 32.84 kg/m2   GENERAL/CONSTITUTIONAL: No acute distress.  EYES: No scleral icterus.  MUSCULOSKELETAL: Warm and well-perfused.  NEUROLOGIC: Alert, oriented, answers questions appropriately.  INTEGUMENTARY: No jaundice.      LABS:  CBC RESULTS:   Recent Labs   Lab Test  07/19/18   1440   WBC  61.2*   RBC  2.76*   HGB  9.7*   HCT  30.7*   MCV  111*   MCH  35.1*   MCHC  31.6   RDW  16.2*   PLT  113*       PATHOLOGY:  Bone marrow biopsy 11/21/17:  FINAL DIAGNOSIS:   Peripheral blood demonstrating macrocytic anemia with thrombocytopenia     Bone marrow trephine biopsy and aspirate demonstrating hypercellular   bone marrow involved by chronic lymphocytic leukemia/small lymphocytic   lymphoma, 13% ringed sideroblasts identified (see comment)     COMMENT:   Immunophenotyping studies demonstrate a kappa monotypic CD5 positive   B-cell population most compatible with chronic lymphocytic leukemia.  An   immunoperoxidase stain for cyclin D1 is pending.  Dysplastic changes are   not identified within the erythroid series.  However, the presence of   increased numbers of ringed sideroblasts raises the possibility of an   associated myelodysplastic syndrome, particularly in light of the noted   increased mean red cell volume and associated thrombocytopenia.  The   thrombocytopenia may also reflect reduced numbers of megakaryocytes   given the involvement of marrow by chronic lymphocytic leukemia.  Many   cases exhibiting ringed sideroblasts are metabolic in origin, without   significant risk of progression to acute leukemia, and these typically   do not show dysplastic morphology as is the case with the current   specimen.  Moreover, 15% ringed sideroblasts are required for a   diagnosis of  RARS. " Cytogenetic studies are pending.     Flow  INTERPRETATION:   Bone Marrow, Left:        CD5 positive Kappa monotypic B cells (82%)     COMMENT:   The clonal B-cell population present in this specimen has a similar   immunophenotype as reported previously in the blood from this patient   ([id number]).  The immunophenotypic findings are suggestive of marrow   involvement by small lymphocytic lymphoma/chronic lymphocytic leukemia   (SLL/CLL). Large cells may not survive specimen processing.  There may   be peripheral blood contamination. Final interpretation requires   correlation with results of other ancillary studies, morphologic and   clinical features.     ISCN:   46,XY,del(13)(q12q14)[2]/45,X,-Y[3]/46,XY[15].nuc   alton(ATMx2,TP53x1)[4/200],(L53K818a2,CLYZ2b5)[21/200]     INTERPRETATION:   Two (10%) of the metaphase cells analyzed comprised a clone   characterized by a deletion within the proximal long arm of a chromosome   13 as the sole karyotypic abnormality. Additionally, three (15%)   metaphases had loss of the Y chromosome as the sole abnormality.     These findings confirm and expand those of the previously reported FISH   analysis ([id number]) that showed 10.5% of interphase cells to have a   signal pattern indicative of loss of the G12H430 locus.     Loss of the Y chromosome, as seen in this case, has been reported both   as an acquired clonal abnormality associated with hematologic (primarily   myeloid) disorders and also as a clinically insignificant finding   associated with the normal aging process in adult males. When presenting   as a clonal abnormality, loss of Y is typically seen in addition to   other cytogenetic abnormalities. This patient's age and the absence of   other chromosomal abnormalities suggest that the loss of Y represents an   age-related finding in this case.       IMAGING:  CT c/a/p 11/29/17:  FINDINGS:   Chest: Possible minor fissure lymph node on series 3, image 45 along  the right  minor fissure measuring 0.3 cm. Anterior right upper lobe  nodule measures 0.3 cm on image 40. A lateral right lower lobe nodule  measures 0.4 cm on image 47. Bibasilar atelectasis is noted. No  infiltrate or consolidation. No pleural or pericardial fluid. Heart is  normal in size. Esophagus is unremarkable. A few small mediastinal and  hilar lymph nodes are present, but none exceed size criteria for  abnormality. Bilateral axillary lymph nodes are also present. The  largest is in the left axilla measuring 1.7 x 1.1 cm on series 2,  image 18. Coronary artery calcification is present. Aorta demonstrates  scattered calcified plaque without aneurysm.     Abdomen: A large infrarenal abdominal aortic aneurysm is noted  measuring 7.6 x 6.7 cm on series 2, image 80 and contains a moderate  amount of mural thrombus. There is slight haziness around the margin  of the aneurysm sac of uncertain significance as a few scattered lymph  nodes are noted in this area. Periaortic inflammation is possible. No  obvious retroperitoneal hemorrhage at this time. A periaortic node on  series 2, image 76 near the superior aspect of the aneurysm measures  1.9 x 1.4 cm. Aneurysm does not extend down into the common iliac  arteries. Probable calcified gallstone in the gallbladder on series 2,  image 69. Gallbladder is otherwise within normal limits. The liver,  pancreas, adrenal glands and kidneys are unremarkable. No  hydronephrosis or renal calculi. Spleen is prominent in size without  focal mass measuring 15.7 cm in AP dimension. Tiny cyst posterior  spleen is noted on series 2, image 64. No evidence of bowel  obstruction or diverticulitis.     Pelvis: The bladder and rectum are unremarkable. No enlarged pelvic  lymph nodes or free fluid. Bone window examination demonstrates  degenerative spine changes.         IMPRESSION:  1. Mildly enlarged left axillary lymph node and periaortic lymph nodes  in the retroperitoneum of the abdomen. Spleen  is also enlarged.  Findings would be consistent with patient's underlying diagnosis of  CLL. Follow up as clinically warranted.  2. Infrarenal abdominal aortic aneurysm measuring up to 7.6 cm in  diameter. Mild indistinctness of the aneurysm wall could be due to  inflammation. No retroperitoneal hemorrhage is currently evident.  Recommend followup with vascular surgery or interventional radiology  for further assessment.    CT angiogram 7/17/18:  1. Stable endograft. Persistent type II endoleak' s as above. Aneurysm  sac size has decreased when compared to the previous exam.  2. Mild splenomegaly.  3. Colonic diverticulosis.  4. Gallstone.      ASSESSMENT/PLAN:  Austin Garza is a 77 year old male with:    1) CLL/SLL: BLACKWOOD stage III, with lymphocytosis, lymphadenopathy, splenomegaly, and anemia.  He has mild thrombocytopenia as well, but is above 100K.  He presented with leukocytosis with WBC of 61.7K, hemoglobin of 10.4, and platelet count of 115K on diagnosis.  Bone marrow biopsy and flow cytometry confirmed CLL/SLL.  CT scan shows mildly enlarged left axillary lymph node and periaortic lymph nodes in the retroperitoneum of the abdomen, with enlarged spleen.      CBC from today reviewed with Austin.  WBC trended back down to 61.2K, which was around the level that it was when he was diagnosed with CLL in November 2017.    He has symptoms of fatigue and sweats around his neck at night.  Elevated lymphocyte count is not necessarily an indication to start treatment.  However, I discussed that if these symptoms are thought to be related to the CLL and if his lymphocyte count continues to rise rapidly or if his cytopenias worsen, this may warrant starting treatment.  He says that he would be okay with that if it is needed.     Since WBC is fairly stable at his baseline, hemoglobin and platelet count are also stable, and his symptoms have not changed, will continue to monitor closely for now.    -RTC in 1 month with  repeat labs.    2) Abdominal aortic aneurysm: measures up to 7.6 cm on CT scan, incidentally found.  He underwent EVAR on 12/4/17.  -follow-up with vascular surgery; he is to follow-up with repeat CTA around January 2019    3) Diabetes, hypertension:  -following with PCP      I spent a total of 25 minutes with the patient, with over >50% of the time in counseling and/or coordination of care.      Breana Perry MD  Hematology/Oncology  [facility]

## 2022-11-15 RX ORDER — LEVOTHYROXINE SODIUM 0.2 MG/1
200 TABLET ORAL DAILY
Qty: 30 TABLET | Refills: 3 | Status: SHIPPED | OUTPATIENT
Start: 2022-11-15

## 2022-11-15 RX ORDER — TRAZODONE HYDROCHLORIDE 50 MG/1
50 TABLET ORAL NIGHTLY PRN
Qty: 30 TABLET | Refills: 3 | Status: SHIPPED | OUTPATIENT
Start: 2022-11-15 | End: 2022-11-16 | Stop reason: SDUPTHER

## 2022-11-15 RX ORDER — HYDROCHLOROTHIAZIDE 12.5 MG/1
CAPSULE, GELATIN COATED ORAL
Qty: 30 CAPSULE | Refills: 3 | Status: SHIPPED | OUTPATIENT
Start: 2022-11-15

## 2022-11-15 NOTE — TELEPHONE ENCOUNTER
Leldon Ellison called requesting a refill of the below medication which has been pended for you:     Requested Prescriptions     Pending Prescriptions Disp Refills    hydroCHLOROthiazide (MICROZIDE) 12.5 MG capsule 30 capsule 3     Sig: TAKE 1 CAPSULE BY MOUTH EVERY DAY    levothyroxine (SYNTHROID) 200 MCG tablet 30 tablet 3     Sig: Take 1 tablet by mouth Daily    traZODone (DESYREL) 50 MG tablet 30 tablet 3     Sig: Take 1 tablet by mouth nightly as needed for Sleep 1/2 tablet nightly as needed Take 50 mg by mouth nightly as needed for Sleep 1/2 tablet nightly as needed       Last Appointment Date: 8/8/2022  Next Appointment Date: 2/8/2023    No Known Allergies

## 2022-11-16 RX ORDER — TRAZODONE HYDROCHLORIDE 50 MG/1
TABLET ORAL
Qty: 30 TABLET | Refills: 3 | Status: SHIPPED | OUTPATIENT
Start: 2022-11-16

## 2022-11-16 NOTE — TELEPHONE ENCOUNTER
Jero Singh called requesting a refill of the below medication which has been pended for you:     Requested Prescriptions     Pending Prescriptions Disp Refills    traZODone (DESYREL) 50 MG tablet 30 tablet 3     Sig: ONE TABLET NIGHTLY AS NEEDED FOR SLEEP       Last Appointment Date: 8/8/2022  Next Appointment Date: 2/8/2023    No Known Allergies    Jero Singh called requesting a refill of the below medication which has been pended for you:     Requested Prescriptions     Pending Prescriptions Disp Refills    traZODone (DESYREL) 50 MG tablet 30 tablet 3     Sig: ONE TABLET NIGHTLY AS NEEDED FOR SLEEP       Last Appointment Date: 8/8/2022  Next Appointment Date: 2/8/2023    No Known Allergies

## 2022-12-06 ENCOUNTER — HOSPITAL ENCOUNTER (OUTPATIENT)
Dept: WOMENS IMAGING | Age: 77
Discharge: HOME | End: 2022-12-06
Payer: MEDICARE

## 2022-12-06 DIAGNOSIS — Z98.890 HISTORY OF RIGHT BREAST BIOPSY: ICD-10-CM

## 2022-12-06 DIAGNOSIS — R92.8 FOLLOW-UP EXAMINATION OF ABNORMAL MAMMOGRAM: ICD-10-CM

## 2022-12-06 PROCEDURE — 77065 DX MAMMO INCL CAD UNI: CPT

## 2022-12-06 PROCEDURE — G0279 TOMOSYNTHESIS, MAMMO: HCPCS

## 2022-12-07 RX ORDER — TRAZODONE HYDROCHLORIDE 50 MG/1
TABLET ORAL
Qty: 30 TABLET | Refills: 3 | Status: SHIPPED | OUTPATIENT
Start: 2022-12-07

## 2022-12-07 NOTE — TELEPHONE ENCOUNTER
Archana Nurse called requesting a refill of the below medication which has been pended for you:     Requested Prescriptions     Pending Prescriptions Disp Refills    traZODone (DESYREL) 50 MG tablet 30 tablet 3     Sig: ONE TABLET NIGHTLY AS NEEDED FOR SLEEP       Last Appointment Date: 8/8/2022  Next Appointment Date: 2/8/2023    No Known Allergies

## 2023-02-03 DIAGNOSIS — E55.9 VITAMIN D DEFICIENCY: ICD-10-CM

## 2023-02-03 DIAGNOSIS — F41.9 ANXIETY: ICD-10-CM

## 2023-02-03 DIAGNOSIS — I10 PRIMARY HYPERTENSION: Primary | ICD-10-CM

## 2023-02-06 DIAGNOSIS — E55.9 VITAMIN D DEFICIENCY: ICD-10-CM

## 2023-02-06 DIAGNOSIS — I10 PRIMARY HYPERTENSION: ICD-10-CM

## 2023-02-06 LAB
ALBUMIN SERPL-MCNC: 3.4 G/DL (ref 3.5–5.2)
ALP BLD-CCNC: 133 U/L (ref 35–104)
ALT SERPL-CCNC: 21 U/L (ref 5–33)
ANION GAP SERPL CALCULATED.3IONS-SCNC: 9 MMOL/L (ref 7–19)
AST SERPL-CCNC: 35 U/L (ref 5–32)
BASOPHILS ABSOLUTE: 0 K/UL (ref 0–0.2)
BASOPHILS RELATIVE PERCENT: 0.7 % (ref 0–1)
BILIRUB SERPL-MCNC: 1.2 MG/DL (ref 0.2–1.2)
BILIRUBIN URINE: NEGATIVE
BLOOD, URINE: NEGATIVE
BUN BLDV-MCNC: 13 MG/DL (ref 8–23)
CALCIUM SERPL-MCNC: 9 MG/DL (ref 8.8–10.2)
CHLORIDE BLD-SCNC: 102 MMOL/L (ref 98–111)
CLARITY: CLEAR
CO2: 27 MMOL/L (ref 22–29)
COLOR: YELLOW
CREAT SERPL-MCNC: 0.8 MG/DL (ref 0.5–0.9)
EOSINOPHILS ABSOLUTE: 0.1 K/UL (ref 0–0.6)
EOSINOPHILS RELATIVE PERCENT: 2.2 % (ref 0–5)
GFR SERPL CREATININE-BSD FRML MDRD: >60 ML/MIN/{1.73_M2}
GLUCOSE BLD-MCNC: 270 MG/DL (ref 74–109)
GLUCOSE URINE: NEGATIVE MG/DL
HCT VFR BLD CALC: 35 % (ref 37–47)
HEMOGLOBIN: 12 G/DL (ref 12–16)
IMMATURE GRANULOCYTES #: 0 K/UL
KETONES, URINE: ABNORMAL MG/DL
LEUKOCYTE ESTERASE, URINE: NEGATIVE
LYMPHOCYTES ABSOLUTE: 1.1 K/UL (ref 1.1–4.5)
LYMPHOCYTES RELATIVE PERCENT: 24.8 % (ref 20–40)
MCH RBC QN AUTO: 31.1 PG (ref 27–31)
MCHC RBC AUTO-ENTMCNC: 34.3 G/DL (ref 33–37)
MCV RBC AUTO: 90.7 FL (ref 81–99)
MONOCYTES ABSOLUTE: 0.3 K/UL (ref 0–0.9)
MONOCYTES RELATIVE PERCENT: 5.4 % (ref 0–10)
NEUTROPHILS ABSOLUTE: 3.1 K/UL (ref 1.5–7.5)
NEUTROPHILS RELATIVE PERCENT: 66.5 % (ref 50–65)
NITRITE, URINE: NEGATIVE
PDW BLD-RTO: 15.1 % (ref 11.5–14.5)
PH UA: 6 (ref 5–8)
PLATELET # BLD: 73 K/UL (ref 130–400)
PMV BLD AUTO: 9.9 FL (ref 9.4–12.3)
POTASSIUM SERPL-SCNC: 4.3 MMOL/L (ref 3.5–5)
PROTEIN UA: NEGATIVE MG/DL
RBC # BLD: 3.86 M/UL (ref 4.2–5.4)
SODIUM BLD-SCNC: 138 MMOL/L (ref 136–145)
SPECIFIC GRAVITY UA: 1.02 (ref 1–1.03)
TOTAL PROTEIN: 6.3 G/DL (ref 6.6–8.7)
UROBILINOGEN, URINE: 1 E.U./DL
VITAMIN D 25-HYDROXY: 54.1 NG/ML
WBC # BLD: 4.6 K/UL (ref 4.8–10.8)

## 2023-02-08 ENCOUNTER — OFFICE VISIT (OUTPATIENT)
Dept: INTERNAL MEDICINE | Age: 78
End: 2023-02-08
Payer: MEDICARE

## 2023-02-08 VITALS
DIASTOLIC BLOOD PRESSURE: 62 MMHG | BODY MASS INDEX: 35.45 KG/M2 | OXYGEN SATURATION: 99 % | SYSTOLIC BLOOD PRESSURE: 142 MMHG | TEMPERATURE: 98.2 F | HEART RATE: 94 BPM | WEIGHT: 213 LBS

## 2023-02-08 DIAGNOSIS — E11.9 CONTROLLED TYPE 2 DIABETES MELLITUS WITHOUT COMPLICATION, WITHOUT LONG-TERM CURRENT USE OF INSULIN (HCC): ICD-10-CM

## 2023-02-08 DIAGNOSIS — K59.01 SLOW TRANSIT CONSTIPATION: ICD-10-CM

## 2023-02-08 DIAGNOSIS — E03.8 OTHER SPECIFIED HYPOTHYROIDISM: ICD-10-CM

## 2023-02-08 DIAGNOSIS — R16.1 SPLENOMEGALY: Primary | ICD-10-CM

## 2023-02-08 DIAGNOSIS — E55.9 VITAMIN D DEFICIENCY: ICD-10-CM

## 2023-02-08 DIAGNOSIS — I10 PRIMARY HYPERTENSION: ICD-10-CM

## 2023-02-08 DIAGNOSIS — K75.81 NASH (NONALCOHOLIC STEATOHEPATITIS): ICD-10-CM

## 2023-02-08 DIAGNOSIS — E66.01 SEVERE OBESITY (BMI 35.0-39.9) WITH COMORBIDITY (HCC): ICD-10-CM

## 2023-02-08 DIAGNOSIS — D69.6 THROMBOCYTOPENIA (HCC): ICD-10-CM

## 2023-02-08 DIAGNOSIS — K74.00 LIVER FIBROSIS: ICD-10-CM

## 2023-02-08 LAB — HBA1C MFR BLD: 6.7 %

## 2023-02-08 PROCEDURE — 99214 OFFICE O/P EST MOD 30 MIN: CPT | Performed by: NURSE PRACTITIONER

## 2023-02-08 PROCEDURE — G8417 CALC BMI ABV UP PARAM F/U: HCPCS | Performed by: NURSE PRACTITIONER

## 2023-02-08 PROCEDURE — 3017F COLORECTAL CA SCREEN DOC REV: CPT | Performed by: NURSE PRACTITIONER

## 2023-02-08 PROCEDURE — 3078F DIAST BP <80 MM HG: CPT | Performed by: NURSE PRACTITIONER

## 2023-02-08 PROCEDURE — G8399 PT W/DXA RESULTS DOCUMENT: HCPCS | Performed by: NURSE PRACTITIONER

## 2023-02-08 PROCEDURE — 83036 HEMOGLOBIN GLYCOSYLATED A1C: CPT | Performed by: NURSE PRACTITIONER

## 2023-02-08 PROCEDURE — 2022F DILAT RTA XM EVC RTNOPTHY: CPT | Performed by: NURSE PRACTITIONER

## 2023-02-08 PROCEDURE — 1036F TOBACCO NON-USER: CPT | Performed by: NURSE PRACTITIONER

## 2023-02-08 PROCEDURE — 1090F PRES/ABSN URINE INCON ASSESS: CPT | Performed by: NURSE PRACTITIONER

## 2023-02-08 PROCEDURE — G8484 FLU IMMUNIZE NO ADMIN: HCPCS | Performed by: NURSE PRACTITIONER

## 2023-02-08 PROCEDURE — 1123F ACP DISCUSS/DSCN MKR DOCD: CPT | Performed by: NURSE PRACTITIONER

## 2023-02-08 PROCEDURE — G8427 DOCREV CUR MEDS BY ELIG CLIN: HCPCS | Performed by: NURSE PRACTITIONER

## 2023-02-08 PROCEDURE — 3046F HEMOGLOBIN A1C LEVEL >9.0%: CPT | Performed by: NURSE PRACTITIONER

## 2023-02-08 PROCEDURE — 3077F SYST BP >= 140 MM HG: CPT | Performed by: NURSE PRACTITIONER

## 2023-02-08 SDOH — ECONOMIC STABILITY: INCOME INSECURITY: HOW HARD IS IT FOR YOU TO PAY FOR THE VERY BASICS LIKE FOOD, HOUSING, MEDICAL CARE, AND HEATING?: NOT HARD AT ALL

## 2023-02-08 SDOH — ECONOMIC STABILITY: FOOD INSECURITY: WITHIN THE PAST 12 MONTHS, YOU WORRIED THAT YOUR FOOD WOULD RUN OUT BEFORE YOU GOT MONEY TO BUY MORE.: NEVER TRUE

## 2023-02-08 SDOH — ECONOMIC STABILITY: FOOD INSECURITY: WITHIN THE PAST 12 MONTHS, THE FOOD YOU BOUGHT JUST DIDN'T LAST AND YOU DIDN'T HAVE MONEY TO GET MORE.: NEVER TRUE

## 2023-02-08 SDOH — ECONOMIC STABILITY: HOUSING INSECURITY
IN THE LAST 12 MONTHS, WAS THERE A TIME WHEN YOU DID NOT HAVE A STEADY PLACE TO SLEEP OR SLEPT IN A SHELTER (INCLUDING NOW)?: NO

## 2023-02-08 ASSESSMENT — ENCOUNTER SYMPTOMS
TROUBLE SWALLOWING: 0
STRIDOR: 0
EYE ITCHING: 0
VOMITING: 0
CONSTIPATION: 1
CHOKING: 0
SHORTNESS OF BREATH: 0
EYE DISCHARGE: 0
DIARRHEA: 0
WHEEZING: 0
ABDOMINAL PAIN: 0
NAUSEA: 0
COUGH: 0
ABDOMINAL DISTENTION: 0
COLOR CHANGE: 0
SORE THROAT: 0
BLOOD IN STOOL: 0

## 2023-02-08 ASSESSMENT — PATIENT HEALTH QUESTIONNAIRE - PHQ9
1. LITTLE INTEREST OR PLEASURE IN DOING THINGS: 1
SUM OF ALL RESPONSES TO PHQ QUESTIONS 1-9: 1
SUM OF ALL RESPONSES TO PHQ9 QUESTIONS 1 & 2: 1
2. FEELING DOWN, DEPRESSED OR HOPELESS: 0

## 2023-02-08 NOTE — PATIENT INSTRUCTIONS
1 hypertension continue same meds no changes  Over 2-with enlarged spleen and fibrous liver by the referral back to GI question of need for  biopsy  3.   Type 2 diabetes mellitus lets restart the metformin you have at home at 500 twice daily we will recheck all this in 3 months  4.   constipation if you want to clean yourself out you can use the whole bottle of MiraLAX and then maintain with a daily dose  #5 vitamin D deficiency stable with 50,000 units weekly

## 2023-02-08 NOTE — ASSESSMENT & PLAN NOTE
This is still in process of being worked up she did not go to the final GI appointment which we are rescheduling for her

## 2023-02-08 NOTE — PROGRESS NOTES
[facility] PHYSICIAN SERVICES  [facility] INTERNAL MEDICINE  [address] [facility] [address]  [address] [address]  Dept: [phone]  Dept Fax: [phone]: [phone]    Rosa White (:  1945) is a 78 y.o. female,Established patient  with green , here for evaluation of the following chief complaint(s): 6 Month Follow-Up      Rosa White is a 78 y.o. female who presents today for her medical conditions/complaints as noted below. Rosa White is c/byron 6 Month Follow-Up        HPI:     Chief Complaint   Patient presents with    6 Month Follow-Up     HPI    Hypertension she is stable with HCTZ 12.5 Bumex 0.5  MILLER  saw GI and had US liver please see other documentation below  T2DM  fasting as 270 she is on no meds; she was not fasting; but her A1c is 6.7 so we will go ahead and restart her metformin    Constipation this is chronic for her she just wants to clean out and sterile over again so I have advised her to take a bottle of MiraLAX  Vitamin D deficiency level is good and she is stable with vitamin D 50,000 weekly  Referred to hematology for enlarged spleen   Hematology notes:      Pancytopenia felt related to splenomegaly. Potential causes of splenomegaly discussed. Will request prior records from Dr. Yosi Mao in [place], [place] -diagnosed with MILLER 2018  Additional serology requested as noted below  If serology unrevealing, discussed need for bone marrow aspirate and biopsy  Consider JAK2 with reflexes due to splenomegaly, if serology unrevealing. Patient was also noted to have significant splenomegaly. She reports a known history of MILLER. She was previously evaluated by [facility] in the [facility] 2019. Medical record reviewed and documented a history of cirrhosis with hepatosplenomegaly and changes of portal hypertension including gastric varices noted on CT scan dated 2018. EGD 2018 was normal, with no varices.     Patient is more recently followed by [facility]) Gastroenterology. She has also undergone medication adjustment for hypothyroidism. she had [facility] MARROW:  the GI and Hematology have sent back and forth with cuase of the splenomegaly;   '      ASSESSMENT/PLAN:    1. Leukopenia, unspecified type    2. Thrombocytopenia (HCC)    3. Splenomegaly    4. MILLER (nonalcoholic steatohepatitis)    5. Hypothyroidism (acquired)    6. Follow-up examination of abnormal mammogram    7. History of right breast biopsy    8. Encounter for screening mammogram for malignant neoplasm of breast        Leukopenia/Thrombocytopenia related to splenomegaly       Recent Labs     10/11/22  1356 09/26/22  1503   WBC 4.11 4.24   HGB 11.6 11.7   HCT 35.4 34.4   MCV 94.4 90.1   PLT 66* 70*      Suspect leukopenia and thrombocytopenia from splenomegaly secondary to possible liver disease  Could be exacerbated by significant hypothyroidism, currently being addressed by PCP  Avoid NSAID use  BMAB negative, as noted above     Splenomegaly  Again, suspect related to possible liver disease  No evidence to suggest lymphoma, peripheral blood smear negative, BMAB negative  JAK2 negative; no MPN     MILLER (nonalcoholic steatohepatitis)  History of MILLER  FibroSure suggests fibrosis score of F4, cirrhosis  CMP w/ abn LFTs  Discussed with KATIA Drew who feels FibroSure is a less sensitive test.  If concern for cirrhosis (given other negative findings; ultrasound liver, LFT, AFP, no varices etc.), liver biopsy would be recommended for definitive diagnosis. Defer to GI if felt warranted. Discussed with patient, recommend continued follow-up with GI.      Hypothyroidism  TSH 32 on 8/2/2022, addressed by PCP  Currently taking Synthroid 200 mcg daily     Patient didn't fu with GI:  so I have recommended her to do that;  unfortunately  Justyn Talbert is no longer there      Past Medical History:   Diagnosis Date    Anxiety     Bruising     Diabetes mellitus ([address]     Edema Fracture of nasal bone     History of cellulitis     History of constipation     medication induced    Hypertension     Non-alcoholic cirrhosis (HCC)     SDH (subdural hematoma) 10/30/2021    small, post fall ([address], [address]    Sleep disorder     Splenomegaly     Thyroid disease       Past Surgical History:   Procedure Laterality Date    CHOLECYSTECTOMY  1978    COLONOSCOPY  2014    normal, per pt    HYSTERECTOMY ([address])  2000    SETH STEROTACTIC LOC BREAST BIOPSY RIGHT Right 04/27/2022    SETH STEROTACTIC LOC BREAST BIOPSY RIGHT 4/27/2022 [facility] [address]    OVARY REMOVAL Bilateral 2000    AGE 50       Vitals 2/8/2023 10/11/2022 9/26/2022 9/1/2022 8/22/2022 6/84/7939   SYSTOLIC 341 385 595 131 911 293   DIASTOLIC 62 64 78 84 84 82   Site - - - - - -   Pulse 94 95 97 94 - -   Temp 98.2 - - - - -   Resp - - - - - -   SpO2 99 99 98 94 - -   Weight 213 lb 213 lb 215 lb 8 oz 216 lb - 212 lb   Height - 5' 5\" - - - 5' 5\"   Body mass index - 35.44 kg/m2 - - - 35.27 kg/m2   Pain Level - - - - - -       Family History   Problem Relation Age of Onset    Heart Disease Mother     Breast Cancer Maternal Aunt 67    Breast Cancer Maternal Aunt 74    Stomach Cancer Maternal Uncle 72        Great Uncle    Colon Polyps Neg Hx     Colon Cancer Neg Hx        Social History     Tobacco Use    Smoking status: Never    Smokeless tobacco: Never   Substance Use Topics    Alcohol use: Never      Current Outpatient Medications   Medication Sig Dispense Refill    metFORMIN (GLUCOPHAGE) 500 MG tablet Take 1 tablet by mouth 2 times daily (with meals) 180 tablet 1    traZODone (DESYREL) 50 MG tablet ONE TABLET NIGHTLY AS NEEDED FOR SLEEP 30 tablet 3    hydroCHLOROthiazide (MICROZIDE) 12.5 MG capsule TAKE 1 CAPSULE BY MOUTH EVERY DAY 30 capsule 3    levothyroxine (SYNTHROID) 200 MCG tablet Take 1 tablet by mouth Daily 30 tablet 3    vitamin D (ERGOCALCIFEROL) 1.25 MG (63788 UT) CAPS capsule TAKE 1 CAPSULE BY MOUTH ONE TIME PER WEEK 12 capsule 1    bumetanide (BUMEX) 0.5 MG tablet Take 1 tablet by mouth in the morning. 90 tablet 1    losartan-hydroCHLOROthiazide (HYZAAR) 50-12.5 MG per tablet Take 1 tablet by mouth daily      b complex vitamins capsule Take 1 capsule by mouth daily       No current facility-administered medications for this visit.      No Known Allergies    Health Maintenance   Topic Date Due    Diabetic retinal exam  Never done    DTaP/Tdap/Td vaccine (1 - Tdap) Never done    Flu vaccine (1) Never done    Shingles vaccine (1 of 2) 04/11/2023 (Originally 5/10/1997)    Pneumococcal 65+ years Vaccine (1 - PCV) 04/15/2024 (Originally 5/10/1951)    COVID-19 Vaccine (4 - Booster for Pfizer series) 08/12/2024 (Originally 11/18/2021)    Lipids  04/15/2023    Diabetic foot exam  07/11/2023    A1C test (Diabetic or Prediabetic)  07/11/2023    Depression Screen  08/08/2023    Annual Wellness Visit (AWV)  08/09/2023    Colorectal Cancer Screen  08/01/2032    DEXA (modify frequency per FRAX score)  Completed    Hepatitis C screen  Completed    Hepatitis A vaccine  Aged Out    Hib vaccine  Aged Out    Meningococcal (ACWY) vaccine  Aged Out       Lab Results   Component Value Date    LABA1C 5.5 07/11/2022     No results found for: PSA, PSADIA  TSH   Date Value Ref Range Status   08/02/2022 32.260 (H) 0.270 - 4.200 uIU/mL Final   ]  Lab Results   Component Value Date     02/06/2023    K 4.3 02/06/2023     02/06/2023    CO2 27 02/06/2023    BUN 13 02/06/2023    CREATININE 0.8 02/06/2023    GLUCOSE 270 (H) 02/06/2023    CALCIUM 9.0 02/06/2023    PROT 6.3 (L) 02/06/2023    LABALBU 3.4 (L) 02/06/2023    BILITOT 1.2 02/06/2023    ALKPHOS 133 (H) 02/06/2023    AST 35 (H) 02/06/2023    ALT 21 02/06/2023    LABGLOM >60 02/06/2023    GFRAA >59 07/11/2022     Lab Results   Component Value Date    CHOL 170 04/15/2022     Lab Results   Component Value Date    TRIG 100 04/15/2022     Lab Results   Component Value Date    HDL 71 04/15/2022     Lab Results   Component Value Date    LDLCALC 79 04/15/2022     Lab Results   Component Value Date/Time     02/06/2023 02:11 PM    K 4.3 02/06/2023 02:11 PM     02/06/2023 02:11 PM    CO2 27 02/06/2023 02:11 PM    BUN 13 02/06/2023 02:11 PM    CREATININE 0.8 02/06/2023 02:11 PM    GLUCOSE 270 02/06/2023 02:11 PM    CALCIUM 9.0 02/06/2023 02:11 PM      Lab Results   Component Value Date    WBC 4.6 (L) 02/06/2023    HGB 12.0 02/06/2023    HCT 35.0 (L) 02/06/2023    MCV 90.7 02/06/2023    PLT 73 (L) 02/06/2023    LYMPHOPCT 24.8 02/06/2023    RBC 3.86 (L) 02/06/2023    MCH 31.1 (H) 02/06/2023    MCHC 34.3 02/06/2023    RDW 15.1 (H) 02/06/2023     Lab Results   Component Value Date    VITD25 54.1 02/06/2023     Labs reviewed from 2/6/2023    Subjective:      Review of Systems   Constitutional:  Negative for fatigue, fever and unexpected weight change. HENT:  Negative for ear discharge, ear pain, mouth sores, sore throat and trouble swallowing. Eyes:  Negative for discharge, itching and visual disturbance. Respiratory:  Negative for cough, choking, shortness of breath, wheezing and stridor. Cardiovascular:  Negative for chest pain, palpitations and leg swelling. Gastrointestinal:  Positive for constipation. Negative for abdominal distention, abdominal pain, blood in stool, diarrhea, nausea and vomiting. Endocrine: Negative for cold intolerance, polydipsia and polyuria. Genitourinary:  Negative for difficulty urinating, dysuria, frequency and urgency. Musculoskeletal:  Negative for arthralgias and gait problem. Skin:  Negative for color change and rash. Allergic/Immunologic: Negative for food allergies and immunocompromised state. Neurological:  Negative for dizziness, tremors, syncope, speech difficulty, weakness and headaches. Hematological:  Negative for adenopathy. Does not bruise/bleed easily. Psychiatric/Behavioral:  Negative for confusion and hallucinations. Objective:     Physical Exam  Constitutional:       General: She is not in acute distress. Appearance: She is well-developed. HENT:      Head: Normocephalic and atraumatic. Eyes:      General: No scleral icterus. Right eye: No discharge. Left eye: No discharge. Pupils: Pupils are equal, round, and reactive to light. Neck:      Thyroid: No thyromegaly. Vascular: No JVD. Cardiovascular:      Rate and Rhythm: Normal rate and regular rhythm. Heart sounds: Normal heart sounds. No murmur heard. Pulmonary:      Effort: Pulmonary effort is normal. No respiratory distress. Breath sounds: Normal breath sounds. No wheezing or rales. Abdominal:      General: Bowel sounds are normal. There is no distension. Palpations: Abdomen is soft. There is no mass. Tenderness: There is no abdominal tenderness. There is no guarding or rebound. Musculoskeletal:         General: No tenderness. Normal range of motion. Cervical back: Normal range of motion and neck supple. Skin:     General: Skin is warm and dry. Findings: No erythema or rash. Neurological:      Mental Status: She is alert and oriented to person, place, and time. Cranial Nerves: No cranial nerve deficit. Coordination: Coordination normal.      Deep Tendon Reflexes: Reflexes are normal and symmetric. Reflexes normal.   Psychiatric:         Mood and Affect: Mood is not depressed. Behavior: Behavior normal.         Thought Content:  Thought content normal.         Judgment: Judgment normal.     BP (!) 142/62   Pulse 94   Temp 98.2 °F (36.8 °C)   Wt 213 lb (96.6 kg)   SpO2 99%   BMI 35.45 kg/m²           Assessment:      Problem List       Liver fibrosis     This is still in process of being worked up she did not go to the final GI appointment which we are rescheduling for her          Relevant Orders    Carline Mckinnon, [facility], KATIA, Gastroenterology, [place]    Severe obesity (BMI 35.0-39. 9) with comorbidity ([address]    Relevant Medications    metFORMIN (GLUCOPHAGE) 500 MG tablet    Slow transit constipation     She wants to get cleaned out advised her to take a bottle of MiraLAX          Splenomegaly - Primary    Relevant Orders    Kelly Galloway, Abraham santoro, KATIA, Gastroenterology, [facility])    Relevant Orders    CBC with Auto Differential    Comprehensive Metabolic Panel    Vitamin D deficiency     She is stable at 50,000 units weekly level is good          Relevant Orders    Vitamin D 25 Hydroxy    Controlled type 2 diabetes mellitus without complication, without long-term current use of insulin ([facility])     A1c has gone back up to 6.7 her fasting sugar was 270 we will restart the metformin 500 twice daily that she has at home          Relevant Medications    metFORMIN (GLUCOPHAGE) 500 MG tablet    Other Relevant Orders    Hemoglobin A1C    Lipid Panel    MILLER (nonalcoholic steatohepatitis)    Other specified hypothyroidism    Relevant Medications    levothyroxine (SYNTHROID) 200 MCG tablet    Primary hypertension     Stable with HCTZ          Relevant Orders    Urinalysis with Reflex to Culture    TSH       Plan:        Patient given educational materials - see patient instructions. Discussed use, benefit, and side effects of prescribed medications. Allpatient questions answered. Pt voiced understanding. Reviewed health maintenance. Instructed to continue current medications, diet and exercise. Patient agreed with treatment plan. Follow up as directed.    MEDICATIONS:  Orders Placed This Encounter   Medications    metFORMIN (GLUCOPHAGE) 500 MG tablet     Sig: Take 1 tablet by mouth 2 times daily (with meals)     Dispense:  180 tablet     Refill:  1           ORDERS:  Orders Placed This Encounter   Procedures    CBC with Auto Differential    Comprehensive Metabolic Panel    Hemoglobin A1C    Lipid Panel    Vitamin D 25 Hydroxy    Urinalysis with Reflex to Culture    [facility]    [address], [facility] KATIA santoro, Gastroenterology, Toni Lyudmila       Follow-up:  Return in about 3 months (around 5/8/2023) for have labs done prior to appt. PATIENT INSTRUCTIONS:  Patient Instructions   1 hypertension continue same meds no changes  Over 2-with enlarged spleen and fibrous liver by the referral back to GI question of need for  biopsy  3. Type 2 diabetes mellitus lets restart the metformin you have at home at 500 twice daily we will recheck all this in 3 months  4.   constipation if you want to clean yourself out you can use the whole bottle of MiraLAX and then maintain with a daily dose  #5 vitamin D deficiency stable with 50,000 units weekly  Electronically signed by KATIA Jones on 2/8/2023 at 12:29 PM    @    [employer]/transcription disclaimer:  Much of this encounter note is electronic transcription/translation of spoken language to printed texts. The electronic translation of spoken language may be erroneous, or at times,nonsensical words or phrases may be inadvertently transcribed.   Although I have reviewed the note for such errors, some may still exist.

## 2023-02-08 NOTE — ASSESSMENT & PLAN NOTE
A1c has gone back up to 6.7 her fasting sugar was 270 we will restart the metformin 500 twice daily that she has at home

## 2023-02-23 RX ORDER — BUMETANIDE 0.5 MG/1
TABLET ORAL
Qty: 30 TABLET | Refills: 5 | Status: SHIPPED | OUTPATIENT
Start: 2023-02-23

## 2023-02-23 NOTE — TELEPHONE ENCOUNTER
Miller Dickey called requesting a refill of the below medication which has been pended for you:     Requested Prescriptions     Pending Prescriptions Disp Refills    bumetanide (BUMEX) 0.5 MG tablet [Pharmacy Med Name: BUMETANIDE 0.5 MG TABLET] 30 tablet 5     Sig: TAKE 1 TABLET BY MOUTH EVERY DAY IN THE MORNING       Last Appointment Date: 2/8/2023  Next Appointment Date: 5/8/2023    No Known Allergies

## 2023-03-10 NOTE — TELEPHONE ENCOUNTER
Joey Baptiste called requesting a refill of the below medication which has been pended for you:     Requested Prescriptions     Pending Prescriptions Disp Refills    vitamin D (ERGOCALCIFEROL) 1.25 MG (33281 UT) CAPS capsule [Pharmacy Med Name: VITAMIN D2 1.25MG(50,000 UNIT)] 12 capsule 1     Sig: TAKE 1 CAPSULE BY MOUTH ONE TIME PER WEEK       Last Appointment Date: 2/8/2023  Next Appointment Date: 5/15/2023    No Known Allergies

## 2023-03-13 RX ORDER — ERGOCALCIFEROL 1.25 MG/1
CAPSULE ORAL
Qty: 12 CAPSULE | Refills: 1 | Status: SHIPPED | OUTPATIENT
Start: 2023-03-13

## 2023-03-15 ENCOUNTER — OFFICE VISIT (OUTPATIENT)
Dept: GASTROENTEROLOGY | Age: 78
End: 2023-03-15
Payer: MEDICARE

## 2023-03-15 VITALS
HEIGHT: 65 IN | OXYGEN SATURATION: 96 % | DIASTOLIC BLOOD PRESSURE: 65 MMHG | SYSTOLIC BLOOD PRESSURE: 139 MMHG | WEIGHT: 210 LBS | BODY MASS INDEX: 34.99 KG/M2 | HEART RATE: 89 BPM

## 2023-03-15 DIAGNOSIS — K62.5 RECTAL BLEEDING: ICD-10-CM

## 2023-03-15 DIAGNOSIS — K59.00 CONSTIPATION, UNSPECIFIED CONSTIPATION TYPE: ICD-10-CM

## 2023-03-15 DIAGNOSIS — R19.4 CHANGE IN BOWEL HABITS: ICD-10-CM

## 2023-03-15 DIAGNOSIS — K76.0 FATTY LIVER: Primary | ICD-10-CM

## 2023-03-15 PROCEDURE — 1123F ACP DISCUSS/DSCN MKR DOCD: CPT | Performed by: NURSE PRACTITIONER

## 2023-03-15 PROCEDURE — 99214 OFFICE O/P EST MOD 30 MIN: CPT | Performed by: NURSE PRACTITIONER

## 2023-03-15 PROCEDURE — G8427 DOCREV CUR MEDS BY ELIG CLIN: HCPCS | Performed by: NURSE PRACTITIONER

## 2023-03-15 PROCEDURE — 1090F PRES/ABSN URINE INCON ASSESS: CPT | Performed by: NURSE PRACTITIONER

## 2023-03-15 PROCEDURE — 3017F COLORECTAL CA SCREEN DOC REV: CPT | Performed by: NURSE PRACTITIONER

## 2023-03-15 PROCEDURE — 3075F SYST BP GE 130 - 139MM HG: CPT | Performed by: NURSE PRACTITIONER

## 2023-03-15 PROCEDURE — G8417 CALC BMI ABV UP PARAM F/U: HCPCS | Performed by: NURSE PRACTITIONER

## 2023-03-15 PROCEDURE — 1036F TOBACCO NON-USER: CPT | Performed by: NURSE PRACTITIONER

## 2023-03-15 PROCEDURE — G8399 PT W/DXA RESULTS DOCUMENT: HCPCS | Performed by: NURSE PRACTITIONER

## 2023-03-15 PROCEDURE — 3078F DIAST BP <80 MM HG: CPT | Performed by: NURSE PRACTITIONER

## 2023-03-15 PROCEDURE — G8484 FLU IMMUNIZE NO ADMIN: HCPCS | Performed by: NURSE PRACTITIONER

## 2023-03-15 ASSESSMENT — ENCOUNTER SYMPTOMS
RECTAL PAIN: 0
SHORTNESS OF BREATH: 0
NAUSEA: 0
ABDOMINAL DISTENTION: 0
ABDOMINAL PAIN: 0
CHOKING: 0
CONSTIPATION: 0
TROUBLE SWALLOWING: 0
DIARRHEA: 0
ANAL BLEEDING: 0
BLOOD IN STOOL: 0
VOMITING: 0
COUGH: 0

## 2023-03-15 NOTE — PATIENT INSTRUCTIONS
Colonoscopy: Before Your Procedure  What is a colonoscopy? A colonoscopy is a test that lets a doctor look inside your colon. The doctor uses a thin, lighted tube called a colonoscope to look for problems. These include small growths called polyps, cancer, or bleeding. During the test, the doctor can take samples of tissue that can be checked for cancer or other problems. This is called a biopsy. The doctor can also take out polyps. Before the test, you will need to stop eating solid foods. You also will be given instructions on how to clean out your colon. This helps your doctor be able to see inside your colon during the test.  How do you prepare for the procedure? Procedures can be stressful. This information will help you understand what you can expect. And it will help you safely prepare for your procedure. Preparing for the procedure    Be sure you have someone to take you home. Anesthesia and pain medicine will make it unsafe for you to drive or get home on your own. Understand exactly what procedure is planned, along with the risks, benefits, and other options. Tell your doctor ALL the medicines, vitamins, supplements, and herbal remedies you take. Some may increase the risk of problems during your procedure. Your doctor will tell you if you should stop taking any of them before the procedure and how soon to do it. If you take a medicine that prevents blood clots, your doctor may tell you to stop taking it before your procedure. Or your doctor may tell you to keep taking it. (These medicines include aspirin and other blood thinners.) Make sure that you understand exactly what your doctor wants you to do. Make sure your doctor and the hospital have a copy of your advance directive. If you don't have one, you may want to prepare one. It lets others know your health care wishes. It's a good thing to have before any type of surgery or procedure.    Before the procedure    Follow your doctor's directions about when to stop eating solid foods and drink only clear liquids. You can drink water, clear juices, clear broths, flavored ice pops, and gelatin (such as Jell-O). Do not eat or drink anything red or purple. This includes grape juice and grape-flavored ice pops. It also includes fruit punch and cherry gelatin. Drink the \"colon prep\" liquid as your doctor tells you. You will want to stay home, because the liquid will make you go to the bathroom a lot. Your stools will be loose and watery. It's very important to drink all of the liquid. If you have problems drinking it, call your doctor. Do not eat any solid foods after you drink the colon prep. Stop drinking clear liquids for a few hours before the test. Your doctor will tell you how many hours this will be. What happens on the day of the procedure? Follow the instructions exactly about when to stop eating and drinking. If you don't, your procedure may be canceled. If your doctor told you to take your medicines on the day of the procedure, take them with only a sip of water. Take a bath or shower before you come in for your procedure. Do not apply lotions, perfumes, deodorants, or nail polish. Take off all jewelry and piercings. And take out contact lenses, if you wear them. At the [address] [place] [address] or [facility]   Bring a picture ID. You will be kept comfortable and safe by your anesthesia provider. The anesthesia may make you sleep. You will lie on your back or your side with your knees drawn up toward your belly. The doctor will gently put a gloved finger into your anus. Then the doctor puts the scope in and moves it into your colon. The scope goes in easily because it is lubricated. The doctor may also use small tools to take tissue samples for a biopsy or to remove polyps. This does not hurt. The test usually takes 30 to 45 minutes. But it may take longer. It depends on what is found and what is done. When should you call your doctor? You have questions or concerns. You don't understand how to prepare for your procedure. You are having trouble with the bowel prep. You become ill before the procedure (such as fever, flu, or a cold). You need to reschedule or have changed your mind about having the procedure. Where can you learn more? Go to http://www.guzman.com/ and enter C315 to learn more about \"Colonoscopy: Before Your Procedure. \"  Current as of: May 4, 2022               Content Version: 13.5  © 7913-5339 [employer]. Care instructions adapted under license by [employer]. If you have questions about a medical condition or this instruction, always ask your healthcare professional. [address] any warranty or liability for your use of this information.

## 2023-03-15 NOTE — PROGRESS NOTES
Subjective:     Patient ID: Jorge Narvaez is a 78 y.o. female  PCP: KATIA Perea  Referring Provider: KATIA Champion    HPI  Patient presents to the office today with the following complaints: Follow-up      Patient seen in the office today for follow up on liver, reports she saw her hematologist  and they suggested a follow up on her liver due to chronic low platelets. (Patient seen in this office 5/13/2022 related to fatty liver disease). According to her labs her platelets have been chronically low (labs are in Epic)  Reports today she is concerned with a change in bowel habits and she takes magnesium to help her bowels. Reports she did see blood on the paper on a few occasions, denies any pain. CBC 2/6/2023:  Lab Results   Component Value Date    WBC 4.6 (L) 02/06/2023    HGB 12.0 02/06/2023    HCT 35.0 (L) 02/06/2023    MCV 90.7 02/06/2023    PLT 73 (L) 02/06/2023    LYMPHOPCT 24.8 02/06/2023    RBC 3.86 (L) 02/06/2023    MCH 31.1 (H) 02/06/2023    MCHC 34.3 02/06/2023    RDW 15.1 (H) 02/06/2023          CMP 2/6/2023:  Lab Results   Component Value Date     02/06/2023    K 4.3 02/06/2023     02/06/2023    CO2 27 02/06/2023    BUN 13 02/06/2023    CREATININE 0.8 02/06/2023    GLUCOSE 270 (H) 02/06/2023    CALCIUM 9.0 02/06/2023    PROT 6.3 (L) 02/06/2023    LABALBU 3.4 (L) 02/06/2023    BILITOT 1.2 02/06/2023    ALKPHOS 133 (H) 02/06/2023    AST 35 (H) 02/06/2023    ALT 21 02/06/2023    LABGLOM >60 02/06/2023    GFRAA >59 07/11/2022          Assessment:     1. Fatty liver  2. Rectal bleeding  3. Constipation, unspecified constipation type  4. Change in bowel habits         Plan:   Schedule Colonoscopy  Instruct on bowel prep. Nothing to eat or drink after midnight the day of the exam.  Unable to drive for 24 hours after the procedure. No aspirin or nonsteroidal anti-inflammatories for 5 days before procedure.   I have discussed the benefits, alternatives, and risks (including bleeding, perforation and death)  for pursuing Endoscopy (EGD/Colonscopy/EUS/ERCP) with the patient and they are willing to continue. We also discussed the need for anesthesia, IV access, proper dietary changes, medication changes if necessary, and need for bowel prep (if ordered) prior to their Endoscopic procedure.  They are aware they must have someone accompany them to their scheduled procedure to drive them home - they agree to the above and are willing to continue.     Orders  No orders of the defined types were placed in this encounter.    Medications  No orders of the defined types were placed in this encounter.        Patient History:     Past Medical History:   Diagnosis Date    Anxiety     Bruising     Diabetes mellitus (HCC)     Edema     Fracture of nasal bone     History of cellulitis     History of constipation     medication induced    Hypertension     Non-alcoholic cirrhosis (HCC)     SDH (subdural hematoma) 10/30/2021    small, [facility] fall ([facility]    Sleep disorder     Splenomegaly     Thyroid disease        Past Surgical History:   Procedure Laterality Date    CHOLECYSTECTOMY  1978    COLONOSCOPY  2014    normal, per pt    HYSTERECTOMY (CERVIX STATUS UNKNOWN)  2000    SETH STEROTACTIC LOC BREAST BIOPSY RIGHT Right 04/27/2022    SETH STEROTACTIC LOC BREAST BIOPSY RIGHT 4/27/2022 [facility]    OVARY REMOVAL Bilateral 2000    AGE 55       Family History   Problem Relation Age of Onset    Heart Disease Mother     Breast Cancer Maternal Aunt 72    Breast Cancer Maternal Aunt 74    Stomach Cancer Maternal Uncle 65        Great Uncle    Colon Polyps Neg Hx     Colon Cancer Neg Hx        Social History     Socioeconomic History    Marital status:    Tobacco Use    Smoking status: Never    Smokeless tobacco: Never   Vaping Use    Vaping Use: Never used   Substance and Sexual Activity    Alcohol use: Never    Drug use: Never     Social Determinants of Health  Financial Resource Strain: Low Risk     Difficulty of Paying Living Expenses: Not hard at all   Food Insecurity: No Food Insecurity    Worried About Running Out of Food in the Last Year: Never true    Ran Out of Food in the Last Year: Never true   Transportation Needs: Unknown    Lack of Transportation (Non-Medical): No   Physical Activity: Sufficiently Active    Days of Exercise per Week: 7 days    Minutes of Exercise per Session: 60 min   Housing Stability: Unknown    Unstable Housing in the Last Year: No       Current Outpatient Medications   Medication Sig Dispense Refill    vitamin D (ERGOCALCIFEROL) 1.25 MG (34153 UT) CAPS capsule TAKE 1 CAPSULE BY MOUTH ONE TIME PER WEEK 12 capsule 1    bumetanide (BUMEX) 0.5 MG tablet TAKE 1 TABLET BY MOUTH EVERY DAY IN THE MORNING 30 tablet 5    metFORMIN (GLUCOPHAGE) 500 MG tablet Take 1 tablet by mouth 2 times daily (with meals) 180 tablet 1    traZODone (DESYREL) 50 MG tablet ONE TABLET NIGHTLY AS NEEDED FOR SLEEP 30 tablet 3    hydroCHLOROthiazide (MICROZIDE) 12.5 MG capsule TAKE 1 CAPSULE BY MOUTH EVERY DAY 30 capsule 3    levothyroxine (SYNTHROID) 200 MCG tablet Take 1 tablet by mouth Daily 30 tablet 3    losartan-hydroCHLOROthiazide (HYZAAR) 50-12.5 MG per tablet Take 1 tablet by mouth daily      b complex vitamins capsule Take 1 capsule by mouth daily       No current facility-administered medications for this visit. No Known Allergies    Review of Systems   Constitutional:  Negative for activity change, appetite change, fatigue, fever and unexpected weight change. HENT:  Negative for trouble swallowing. Respiratory:  Negative for cough, choking and shortness of breath. Cardiovascular:  Negative for chest pain. Gastrointestinal:  Negative for abdominal distention, abdominal pain, anal bleeding, blood in stool, constipation, diarrhea, nausea, rectal pain and vomiting. Allergic/Immunologic: Negative for food allergies.    All other systems reviewed and are negative. Objective:     /65 (Site: Left Upper Arm)   Pulse 89   Ht 5' 5\" (1.651 m)   Wt 210 lb (95.3 kg)   SpO2 96%   BMI 34.95 kg/m²     Physical Exam  Vitals reviewed. Constitutional:       General: She is not in acute distress. Appearance: She is well-developed. HENT:      Head: Normocephalic and atraumatic. Right Ear: External ear normal.      Left Ear: External ear normal.      Nose: Nose normal.   Eyes:      General: No scleral icterus. Right eye: No discharge. Left eye: No discharge. Conjunctiva/sclera: Conjunctivae normal.      Pupils: Pupils are equal, round, and reactive to light. Cardiovascular:      Rate and Rhythm: Normal rate and regular rhythm. Heart sounds: Normal heart sounds. No murmur heard. Pulmonary:      Effort: Pulmonary effort is normal. No respiratory distress. Breath sounds: Normal breath sounds. No wheezing or rales. Abdominal:      General: Bowel sounds are normal. There is no distension. Palpations: Abdomen is soft. There is no mass. Tenderness: There is no abdominal tenderness. There is no guarding or rebound. Musculoskeletal:         General: Normal range of motion. Cervical back: Normal range of motion and neck supple. Skin:     General: Skin is warm and dry. Coloration: Skin is not pale. Neurological:      Mental Status: She is alert and oriented to person, place, and time.    Psychiatric:         Behavior: Behavior normal.

## 2023-03-20 RX ORDER — ERGOCALCIFEROL 1.25 MG/1
50000 CAPSULE ORAL WEEKLY
Qty: 12 CAPSULE | Refills: 1 | Status: SHIPPED | OUTPATIENT
Start: 2023-03-20

## 2023-03-20 NOTE — TELEPHONE ENCOUNTER
Juan C Barahona called requesting a refill of the below medication which has been pended for you:     Requested Prescriptions     Pending Prescriptions Disp Refills    vitamin D (ERGOCALCIFEROL) 1.25 MG (76715 UT) CAPS capsule 12 capsule 1     Sig: Take 1 capsule by mouth once a week       Last Appointment Date: 2/8/2023  Next Appointment Date: 5/15/2023    No Known Allergies

## 2023-04-03 RX ORDER — LEVOTHYROXINE SODIUM 0.2 MG/1
TABLET ORAL
Qty: 30 TABLET | Refills: 3 | Status: SHIPPED | OUTPATIENT
Start: 2023-04-03

## 2023-04-03 NOTE — TELEPHONE ENCOUNTER
Anai Flowers called requesting a refill of the below medication which has been pended for you:     Requested Prescriptions     Pending Prescriptions Disp Refills    levothyroxine (SYNTHROID) 200 MCG tablet [Pharmacy Med Name: LEVOTHYROXINE 200 MCG TABLET] 30 tablet 3     Sig: TAKE 1 TABLET BY MOUTH EVERY DAY       Last Appointment Date: 2/8/2023  Next Appointment Date: 5/15/2023    No Known Allergies

## 2023-04-11 ENCOUNTER — HOSPITAL ENCOUNTER (OUTPATIENT)
Dept: INFUSION THERAPY | Age: 78
Discharge: HOME | End: 2023-04-11
Payer: MEDICARE

## 2023-04-11 DIAGNOSIS — D69.6 THROMBOCYTOPENIA (HCC): ICD-10-CM

## 2023-04-11 LAB
BASOPHILS # BLD: 0.03 K/UL (ref 0.01–0.08)
BASOPHILS NFR BLD: 0.6 % (ref 0.1–1.2)
EOSINOPHIL # BLD: 0.14 K/UL (ref 0.04–0.54)
EOSINOPHIL NFR BLD: 2.9 % (ref 0.7–7)
ERYTHROCYTE [DISTWIDTH] IN BLOOD BY AUTOMATED COUNT: 15.4 % (ref 11.7–14.4)
HCT VFR BLD AUTO: 35.1 % (ref 34.1–44.9)
HGB BLD-MCNC: 11.8 G/DL (ref 11.2–15.7)
LYMPHOCYTES # BLD: 1.11 K/UL (ref 1.18–3.74)
LYMPHOCYTES NFR BLD: 23.2 % (ref 19.3–53.1)
MCH RBC QN AUTO: 31.2 PG (ref 25.6–32.2)
MCHC RBC AUTO-ENTMCNC: 33.6 G/DL (ref 32.3–35.5)
MCV RBC AUTO: 92.9 FL (ref 79.4–94.8)
MONOCYTES # BLD: 0.35 K/UL (ref 0.24–0.82)
MONOCYTES NFR BLD: 7.3 % (ref 4.7–12.5)
NEUTROPHILS # BLD: 3.15 K/UL (ref 1.56–6.13)
NEUTS SEG NFR BLD: 65.8 % (ref 34–71.1)
PLATELET # BLD AUTO: 78 K/UL (ref 182–369)
PMV BLD AUTO: 10.7 FL (ref 7.4–10.4)
RBC # BLD AUTO: 3.78 M/UL (ref 3.93–5.22)
WBC # BLD AUTO: 4.79 K/UL (ref 3.98–10.04)

## 2023-04-11 PROCEDURE — 36415 COLL VENOUS BLD VENIPUNCTURE: CPT

## 2023-04-11 PROCEDURE — 85025 COMPLETE CBC W/AUTO DIFF WBC: CPT

## 2023-04-17 ENCOUNTER — ANESTHESIA (OUTPATIENT)
Dept: OPERATING ROOM | Age: 78
End: 2023-04-17

## 2023-04-17 ENCOUNTER — ANESTHESIA EVENT (OUTPATIENT)
Dept: OPERATING ROOM | Age: 78
End: 2023-04-17

## 2023-04-17 ENCOUNTER — HOSPITAL ENCOUNTER (OUTPATIENT)
Age: 78
Setting detail: OUTPATIENT SURGERY
Discharge: HOME | End: 2023-04-17
Attending: INTERNAL MEDICINE | Admitting: INTERNAL MEDICINE

## 2023-04-17 ENCOUNTER — APPOINTMENT (OUTPATIENT)
Dept: OPERATING ROOM | Age: 78
End: 2023-04-17

## 2023-04-17 ENCOUNTER — HOSPITAL ENCOUNTER (OUTPATIENT)
Age: 78
Setting detail: SPECIMEN
Discharge: HOME | End: 2023-04-17
Payer: MEDICARE

## 2023-04-17 VITALS
DIASTOLIC BLOOD PRESSURE: 75 MMHG | RESPIRATION RATE: 16 BRPM | BODY MASS INDEX: 34.82 KG/M2 | HEART RATE: 81 BPM | OXYGEN SATURATION: 98 % | HEIGHT: 65 IN | TEMPERATURE: 98.3 F | WEIGHT: 209 LBS | SYSTOLIC BLOOD PRESSURE: 148 MMHG

## 2023-04-17 PROCEDURE — G8907 PT DOC NO EVENTS ON DISCHARG: HCPCS

## 2023-04-17 PROCEDURE — G8918 PT W/O PREOP ORDER IV AB PRO: HCPCS

## 2023-04-17 PROCEDURE — 45385 COLONOSCOPY W/LESION REMOVAL: CPT

## 2023-04-17 PROCEDURE — 88305 TISSUE EXAM BY PATHOLOGIST: CPT

## 2023-04-17 RX ORDER — SODIUM CHLORIDE, SODIUM LACTATE, POTASSIUM CHLORIDE, CALCIUM CHLORIDE 600; 310; 30; 20 MG/100ML; MG/100ML; MG/100ML; MG/100ML
INJECTION, SOLUTION INTRAVENOUS CONTINUOUS
Status: DISCONTINUED | OUTPATIENT
Start: 2023-04-17 | End: 2023-04-17 | Stop reason: HOSPADM

## 2023-04-17 RX ORDER — LIDOCAINE HYDROCHLORIDE 10 MG/ML
INJECTION, SOLUTION EPIDURAL; INFILTRATION; INTRACAUDAL; PERINEURAL PRN
Status: DISCONTINUED | OUTPATIENT
Start: 2023-04-17 | End: 2023-04-17 | Stop reason: SDUPTHER

## 2023-04-17 RX ORDER — PROPOFOL 10 MG/ML
INJECTION, EMULSION INTRAVENOUS PRN
Status: DISCONTINUED | OUTPATIENT
Start: 2023-04-17 | End: 2023-04-17 | Stop reason: SDUPTHER

## 2023-04-17 RX ADMIN — SODIUM CHLORIDE, SODIUM LACTATE, POTASSIUM CHLORIDE, CALCIUM CHLORIDE: 600; 310; 30; 20 INJECTION, SOLUTION INTRAVENOUS at 12:25

## 2023-04-17 RX ADMIN — PROPOFOL 300 MG: 10 INJECTION, EMULSION INTRAVENOUS at 13:00

## 2023-04-17 RX ADMIN — LIDOCAINE HYDROCHLORIDE 30 MG: 10 INJECTION, SOLUTION EPIDURAL; INFILTRATION; INTRACAUDAL; PERINEURAL at 13:00

## 2023-04-17 ASSESSMENT — PAIN - FUNCTIONAL ASSESSMENT: PAIN_FUNCTIONAL_ASSESSMENT: NONE - DENIES PAIN

## 2023-04-17 NOTE — ANESTHESIA POSTPROCEDURE EVALUATION
Department of Anesthesiology  Postprocedure Note    Patient: Karl Lowe  MRN: [id number]  YOB: 1945  Date of evaluation: 4/17/2023      Procedure Summary     Date: 04/17/23 Room / Location: [facility] / [address]    Anesthesia Start: 1247 Anesthesia Stop:     Procedure: COLONOSCOPY DIAGNOSTIC (Abdomen) Diagnosis:       RB (rectal bleeding)      Change in bowel habits      (RB (rectal bleeding) [K62.5])      (Change in bowel habits [R19.4])    Surgeons: Mirlande Bailey MD Responsible Provider: KATIA Moore CRNA    Anesthesia Type: general, TIVA ASA Status: 3          Anesthesia Type: No value filed.     Ninfa Phase I:      Ninfa Phase II:        Anesthesia Post Evaluation    Patient location during evaluation: bedside  Patient participation: complete - patient participated  Level of consciousness: sleepy but conscious  Pain score: 0  Airway patency: patent  Nausea & Vomiting: no nausea and no vomiting  Complications: no  Cardiovascular status: blood pressure returned to baseline  Respiratory status: acceptable, room air and spontaneous ventilation  Hydration status: euvolemic

## 2023-04-17 NOTE — DISCHARGE INSTRUCTIONS
Recommendations:  1. Repeat colonoscopy: pending pathology - 5 years  2. Await biopsy results  3. Miralax daily- start today    POST-OP ORDERS: ENDOSCOPY & COLONOSCOPY:    1. Rest today. 2. DO NOT eat or drink until wide awake; eat your usual diet today in moderate amount only. 3. DO NOT drive today. 4. Call physician if you have severe pain, vomiting, fever, rectal bleeding or black bowel movements. 5.  If a biopsy was taken or a polyp removed, you should expect to hear results in about 7-10 days. If you have heard nothing from your physician by then, call the office for results. 6.  Discharge home when patient awake, vitals signs stable and tolerating liquids.

## 2023-04-17 NOTE — OP NOTE
during the procedure to remove as much air as possible from the bowel. The colonoscope was removed from the patient, and the procedure was terminated. Findings are listed below. Findings: The mucosa appeared normal throughout the entire examined colon. In the transverse colon, a 6 mm sessile polyp was removed completely with cold snare polypectomy. Retroflexion in the rectum was normal and revealed no further abnormalities. Recommendations:  1. Repeat colonoscopy: pending pathology - 5 years  2. Await biopsy results  3. Miralax daily- start today    Findings and recommendations were discussed w/ the patient. A copy of the images was provided. Kulwant Hou am scribing for and in the presence of Dr. Lyla Osman MD.  Electronically signed by Lindsay Arnold RN on 4/17/2023 at 12:22 PM    I personally performed the services described in this documentation as scribed by Luis Carlos Garcia, and it appears accurate and complete.      Lyla Osman MD  4/17/2023

## 2023-04-17 NOTE — ANESTHESIA PRE PROCEDURE
Cardiology follow-up    [facility]  [address] [place] [address]  Miki Darling MD        Assessment     There are no diagnoses linked to this encounter.      No orders of the defined types were placed in this encounter.           Assesment and Plan:     PAF     Stable   Continue current management.  Conservative management for now.  Continue to follow with EP.                  HTN     Not well controlled.    We will add Norvasc 5 mg p.o. daily.      HLP   · 1/23/2021 labs at primary care physician's office total cholesterol of 158 HDL 47 LDL 88 triglyceride 126 normal TSH normal A1c  Not optimal.    Increase Lipitor to 40.     Hypothyroidism              Controlled per patient.     Possible sleep apnea will need sleep study              Cardiac Imaging:     · 10/10/2019 EKG with NSR  · 8/14/2019 exercise stress echo with normal EF.  Negative for ischemia.  EF 60-55%.  Exercised for total of 7 minutes 34 seconds of Graham protocol.    · 9/12/2000 nineteen 2D echo with EF 60-65 percent, normal wall motion abnormality.  No significant valve pathology.     Referral Provider: Fred Joseph MD     HPI:  77-year-old dyslipidemia hypertension, hypothyroidism, PAF on anticoagulation, normal EF, here for follow-up.     She has been doing well from the cardiac standpoint of view.  Denies any cardiovascular symptoms.  Denies any chest pain, PND, orthopnea, palpitations, presyncopal or syncopal episodes.    They will had a long discussion regarding the plan.  She is stable from her arrhythmia standpoint of view.  Her blood pressures mildly elevated.  Added Norvasc.  Lipids not at target.  Will increase Lipitor to 40.    Rest the medication will remain the same.  Continue to use Kardia monitor       Follow-up with us in 6 to 9 months       The ASCVD Risk score (Curtis DC Jr., et al., 2013) failed to calculate for the following reasons:    Cannot find a previous HDL lab    Cannot find a  S02. 2XXA    Slow transit constipation K59.01    Splenomegaly R16.1    Liver fibrosis K74.00    Vitamin D deficiency E55.9       Past Medical History:        Diagnosis Date    Anxiety     Bruising     Diabetes mellitus ([facility]     Edema     Fracture of nasal bone     History of cellulitis     History of constipation     medication induced    Hypertension     Non-alcoholic cirrhosis ([facility]     SDH (subdural hematoma) 10/30/2021    small, post fall ([facility] [place]    Sleep disorder     Splenomegaly     Thyroid disease        Past Surgical History:        Procedure Laterality Date    CHOLECYSTECTOMY  1978    COLONOSCOPY  2014    normal, per pt    HYSTERECTOMY ([facility])  2000    SETH STEROTACTIC LOC BREAST BIOPSY RIGHT Right 04/27/2022    SETH STEROTACTIC LOC BREAST BIOPSY RIGHT 4/27/2022 [facility] [address]    OVARY REMOVAL Bilateral 2000    AGE 50       Social History:    Social History     Tobacco Use    Smoking status: Never    Smokeless tobacco: Never   Substance Use Topics    Alcohol use: Never                                Counseling given: Not Answered      Vital Signs (Current):   Vitals:    04/17/23 1218   BP: (!) 149/74   Pulse: 94   Resp: 16   Temp: 97.8 °F (36.6 °C)   SpO2: 97%   Weight: 209 lb (94.8 kg)   Height: 5' 5\" (1.651 m)                                              BP Readings from Last 3 Encounters:   04/17/23 (!) 149/74   03/15/23 139/65   02/08/23 (!) 142/62       NPO Status: Time of last liquid consumption: 0800                        Time of last solid consumption: 2300                        Date of last liquid consumption: 04/17/23                        Date of last solid food consumption: 04/15/23    BMI:   Wt Readings from Last 3 Encounters:   04/17/23 209 lb (94.8 kg)   03/15/23 210 lb (95.3 kg)   02/08/23 213 lb (96.6 kg)     Body mass index is 34.78 kg/m².     CBC:   Lab Results   Component Value Date/Time    WBC 4.79 04/11/2023 01:43 PM previous total cholesterol lab        Labs:  CHOLESTEROL (mg/dl)   Date Value   06/27/2017 165     HDL (mg/dl)   Date Value   06/27/2017 54 (L)     CHOL/HDL ( )   Date Value   06/27/2017 3.1     TRIGLYCERIDE (mg/dl)   Date Value   06/27/2017 114     CALCULATED LDL (mg/dl)   Date Value   06/27/2017 88        Hemoglobin A1C (%)   Date Value   06/27/2017 5.8 (H)        No results found for: WBC  No results found for: RBC  No results found for: HCT  No results found for: HGB  No results found for: PLT     Sodium (mmol/L)   Date Value   08/21/2017 141     Potassium (mmol/L)   Date Value   08/21/2017 4.1     Chloride (mmol/L)   Date Value   08/21/2017 104     Glucose (mg/dl)   Date Value   08/21/2017 85     CALCIUM (mg/dl)   Date Value   08/21/2017 9.4     Carbon Dioxide (mmol/L)   Date Value   08/21/2017 31     BUN (mg/dl)   Date Value   08/21/2017 10     Creatinine (mg/dl)   Date Value   08/21/2017 0.68        AST/SGOT (Units/L)   Date Value   08/21/2017 21     ALT/SGPT (Units/L)   Date Value   08/21/2017 26     No results found for: GGTP  ALK PHOSPHATASE (Units/L)   Date Value   08/21/2017 85     TOTAL BILIRUBIN (mg/dl)   Date Value   08/21/2017 0.7        Imaging:  No results found.  No results found for this or any previous visit.           Subjective     Patient ID: Bryanna is a 77 year old female.    Chief Complaint   Patient presents with   • Follow-up     of one year   • Office Visit         Past Medical History:   Diagnosis Date   • Breast cancer (CMS/HCC)    • Dyslipidemia    • HTN (hypertension)    • Hypothyroidism    • MDD (major depressive disorder)    • PUD (peptic ulcer disease)      Social History     Tobacco Use   • Smoking status: Never Smoker   • Smokeless tobacco: Never Used   Substance Use Topics   • Alcohol use: Never   • Drug use: Never     Current Outpatient Medications   Medication Sig Dispense Refill   • benazepril (LOTENSIN) 40 MG tablet TAKE 1 TABLET EVERY DAY 90 tablet 0   • carvedilol (COREG)  25 MG tablet TAKE 1 TABLET TWICE DAILY WITH MEALS 180 tablet 0   • hydrochlorothiazide (HYDRODIURIL) 50 MG tablet TAKE 1 TABLET EVERY DAY 90 tablet 0   • venlafaxine XR (EFFEXOR XR) 75 MG 24 hr capsule Take 75 mg by mouth daily.     • Eliquis 5 MG Tab Take 1 tablet by mouth 2 times daily. 180 tablet 3   • atorvastatin (LIPITOR) 10 MG tablet Take 1 tablet by mouth daily. 90 tablet 3   • lansoprazole (PREVACID) 30 MG capsule Take 1 capsule by mouth daily.     • levothyroxine (SYNTHROID, LEVOTHROID) 25 MCG tablet Take 1 tablet by mouth daily.     • zolpidem (AMBIEN) 10 MG tablet Take 1 tablet by mouth as needed.     • venlafaxine XR (EFFEXOR XR) 37.5 MG 24 hr capsule Take 1 capsule by mouth daily.       No current facility-administered medications for this visit.     Family History   Problem Relation Age of Onset   • Sudden Death Neg Hx      ALLERGIES:  Patient has no known allergies.      Review of Systems   12 point review of systems done, all negative except per HPI.    Vitals:    03/01/22 0834   BP: (!) 147/70   BP Location: LUE - Left upper extremity   Patient Position: Sitting   Cuff Size: Regular   Pulse: 61   SpO2: 96%   Weight: 65.6 kg (144 lb 10 oz)   Height: 5' 1\" (1.549 m)   PainSc:  0          Wt Readings from Last 4 Encounters:   03/01/22 65.6 kg (144 lb 10 oz)   02/22/22 67.8 kg (149 lb 5.8 oz)   03/08/21 65.1 kg (143 lb 6.6 oz)   02/18/21 64.7 kg (142 lb 10.2 oz)         Physical Exam  Constitutional:       Appearance: She is well-developed.   HENT:      Head: Normocephalic.   Eyes:      Pupils: Pupils are equal, round, and reactive to light.   Neck:      Thyroid: No thyromegaly.      Vascular: No JVD.   Cardiovascular:      Rate and Rhythm: Normal rate and regular rhythm.      Pulses: Intact distal pulses.      Heart sounds:     No friction rub. No gallop.   Pulmonary:      Effort: Pulmonary effort is normal. No respiratory distress.      Breath sounds: Normal breath sounds. No wheezing or rales.    Chest:      Chest wall: No tenderness.   Abdominal:      General: Bowel sounds are normal. There is no distension.      Palpations: Abdomen is soft. There is no mass.      Tenderness: There is no abdominal tenderness.   Musculoskeletal:         General: Normal range of motion.      Cervical back: Normal range of motion.   Skin:     General: Skin is warm and dry.      Coloration: Skin is not pale.      Findings: No erythema.   Neurological:      Mental Status: She is alert and oriented to person, place, and time.           Miki Darling MD   Interventional Cardiology       Disclaimer: this document  was dictated using Dragon Voice Recognition Software. Rapid proofreading  was performed to expedite delivery of information. Phonetic errors will occur, which are common with voice recognition software. If there is concern about meaning or content, please contact our office    The total amount of time spent in the care of this patient ( this includes. but is not limited to,   face-to-face interaction, imaging and chart review, discussion with other physicians and member of the health care team and coordination of care) is 45 minutes.   Spent greater than 50% of time with counseling and education. counseling regarding importance of medication compliance, importance of optimal diabetes management, importance of optimal blood pressure management, and importance to abstain and or quit smoking as well as review of past medical records and cardiovascular work.

## 2023-04-17 NOTE — H&P
Plan:  Proceed with planned sedation and procedure as above. Kaylin Oseguera am scribing for and in the presence of Dr. Matt Medel MD.  Electronically signed by Norma Burgos RN on 4/17/2023 at 12:16 PM    I personally performed the services described in this documentation as scribed by Jesus Mcgraw, and it appears accurate and complete.      Matt Medel MD  4/17/2023

## 2023-04-24 PROBLEM — D36.9 ADENOMATOUS POLYP: Status: ACTIVE | Noted: 2023-04-24

## 2023-05-08 ENCOUNTER — HOSPITAL ENCOUNTER (OUTPATIENT)
Dept: ULTRASOUND IMAGING | Age: 78
Discharge: HOME | End: 2023-05-08
Payer: MEDICARE

## 2023-05-08 DIAGNOSIS — E55.9 VITAMIN D DEFICIENCY: ICD-10-CM

## 2023-05-08 DIAGNOSIS — I10 PRIMARY HYPERTENSION: ICD-10-CM

## 2023-05-08 DIAGNOSIS — E11.9 CONTROLLED TYPE 2 DIABETES MELLITUS WITHOUT COMPLICATION, WITHOUT LONG-TERM CURRENT USE OF INSULIN (HCC): ICD-10-CM

## 2023-05-08 DIAGNOSIS — D69.6 THROMBOCYTOPENIA (HCC): ICD-10-CM

## 2023-05-08 DIAGNOSIS — K76.0 FATTY LIVER: ICD-10-CM

## 2023-05-08 LAB
25(OH)D3 SERPL-MCNC: 59.5 NG/ML
ALBUMIN SERPL-MCNC: 3.7 G/DL (ref 3.5–5.2)
ALP SERPL-CCNC: 129 U/L (ref 35–104)
ALT SERPL-CCNC: 15 U/L (ref 5–33)
ANION GAP SERPL CALCULATED.3IONS-SCNC: 7 MMOL/L (ref 7–19)
AST SERPL-CCNC: 30 U/L (ref 5–32)
BASOPHILS # BLD: 0 K/UL (ref 0–0.2)
BASOPHILS NFR BLD: 0.6 % (ref 0–1)
BILIRUB SERPL-MCNC: 1.3 MG/DL (ref 0.2–1.2)
BILIRUB UR QL STRIP: NEGATIVE
BUN SERPL-MCNC: 16 MG/DL (ref 8–23)
CALCIUM SERPL-MCNC: 9.2 MG/DL (ref 8.8–10.2)
CHLORIDE SERPL-SCNC: 106 MMOL/L (ref 98–111)
CHOLEST SERPL-MCNC: 160 MG/DL (ref 160–199)
CLARITY UR: CLEAR
CO2 SERPL-SCNC: 30 MMOL/L (ref 22–29)
COLOR UR: YELLOW
CREAT SERPL-MCNC: 0.9 MG/DL (ref 0.5–0.9)
EOSINOPHIL # BLD: 0.2 K/UL (ref 0–0.6)
EOSINOPHIL NFR BLD: 3.7 % (ref 0–5)
ERYTHROCYTE [DISTWIDTH] IN BLOOD BY AUTOMATED COUNT: 15.5 % (ref 11.5–14.5)
GLUCOSE SERPL-MCNC: 151 MG/DL (ref 74–109)
GLUCOSE UR STRIP.AUTO-MCNC: NEGATIVE MG/DL
HCT VFR BLD AUTO: 36.3 % (ref 37–47)
HDLC SERPL-MCNC: 73 MG/DL (ref 65–121)
HGB BLD-MCNC: 12.4 G/DL (ref 12–16)
HGB UR STRIP.AUTO-MCNC: NEGATIVE MG/L
IMM GRANULOCYTES # BLD: 0 K/UL
KETONES UR STRIP.AUTO-MCNC: NEGATIVE MG/DL
LDLC SERPL CALC-MCNC: 71 MG/DL
LEUKOCYTE ESTERASE UR QL STRIP.AUTO: NEGATIVE
LYMPHOCYTES # BLD: 1.2 K/UL (ref 1.1–4.5)
LYMPHOCYTES NFR BLD: 23.6 % (ref 20–40)
MCH RBC QN AUTO: 31.8 PG (ref 27–31)
MCHC RBC AUTO-ENTMCNC: 34.2 G/DL (ref 33–37)
MCV RBC AUTO: 93.1 FL (ref 81–99)
MONOCYTES # BLD: 0.3 K/UL (ref 0–0.9)
MONOCYTES NFR BLD: 5.3 % (ref 0–10)
NEUTROPHILS # BLD: 3.2 K/UL (ref 1.5–7.5)
NEUTS SEG NFR BLD: 66.4 % (ref 50–65)
NITRITE UR QL STRIP.AUTO: NEGATIVE
PH UR STRIP.AUTO: 7 [PH] (ref 5–8)
PLATELET # BLD AUTO: 89 K/UL (ref 130–400)
PMV BLD AUTO: 10.6 FL (ref 9.4–12.3)
POTASSIUM SERPL-SCNC: 4.4 MMOL/L (ref 3.5–5)
PROT SERPL-MCNC: 7 G/DL (ref 6.6–8.7)
PROT UR STRIP.AUTO-MCNC: NEGATIVE MG/DL
RBC # BLD AUTO: 3.9 M/UL (ref 4.2–5.4)
SODIUM SERPL-SCNC: 143 MMOL/L (ref 136–145)
SP GR UR STRIP.AUTO: 1.01 (ref 1–1.03)
TRIGL SERPL-MCNC: 78 MG/DL (ref 0–149)
TSH SERPL DL<=0.005 MIU/L-ACNC: 0.05 UIU/ML (ref 0.27–4.2)
UROBILINOGEN UR STRIP.AUTO-MCNC: 1 E.U./DL
WBC # BLD AUTO: 4.9 K/UL (ref 4.8–10.8)

## 2023-05-08 PROCEDURE — 76705 ECHO EXAM OF ABDOMEN: CPT

## 2023-05-08 PROCEDURE — 76705 ECHO EXAM OF ABDOMEN: CPT | Performed by: RADIOLOGY

## 2023-05-12 ENCOUNTER — TELEPHONE (OUTPATIENT)
Dept: GASTROENTEROLOGY | Age: 78
End: 2023-05-12

## 2023-05-12 NOTE — TELEPHONE ENCOUNTER
----- Message from KATIA Abdi sent at 5/11/2023 12:58 PM CDT -----  Liver slightly nodular in contour and coarse in appearance, no liver lesion   Spleen enlarged will repeat liver ultrasound in 6 months     05- Called [facility] for the patient to return a call to the office     05- Called and notified of results and recommendations as per CT APRN

## 2023-05-12 NOTE — TELEPHONE ENCOUNTER
----- Message from KATIA Jorgensen sent at 5/11/2023  9:39 AM CDT -----  Bilirubin and alkaline phosphatase slightly elevated we will continue to monitor      05- Called [facility] for the patient to return a call to the office        05- Called and notified of results and recommendations as per CT KATIA

## 2023-05-15 ENCOUNTER — OFFICE VISIT (OUTPATIENT)
Dept: INTERNAL MEDICINE | Age: 78
End: 2023-05-15
Payer: MEDICARE

## 2023-05-15 VITALS
BODY MASS INDEX: 35.82 KG/M2 | HEART RATE: 80 BPM | SYSTOLIC BLOOD PRESSURE: 132 MMHG | OXYGEN SATURATION: 98 % | WEIGHT: 215 LBS | DIASTOLIC BLOOD PRESSURE: 60 MMHG | HEIGHT: 65 IN

## 2023-05-15 DIAGNOSIS — I10 PRIMARY HYPERTENSION: ICD-10-CM

## 2023-05-15 DIAGNOSIS — F51.01 PRIMARY INSOMNIA: ICD-10-CM

## 2023-05-15 DIAGNOSIS — E11.9 CONTROLLED TYPE 2 DIABETES MELLITUS WITHOUT COMPLICATION, WITHOUT LONG-TERM CURRENT USE OF INSULIN (HCC): ICD-10-CM

## 2023-05-15 DIAGNOSIS — E03.9 HYPOTHYROIDISM, UNSPECIFIED TYPE: Primary | ICD-10-CM

## 2023-05-15 DIAGNOSIS — R73.9 HYPERGLYCEMIA: ICD-10-CM

## 2023-05-15 DIAGNOSIS — E55.9 VITAMIN D DEFICIENCY: ICD-10-CM

## 2023-05-15 DIAGNOSIS — K59.01 SLOW TRANSIT CONSTIPATION: ICD-10-CM

## 2023-05-15 DIAGNOSIS — R60.0 LOCALIZED EDEMA: ICD-10-CM

## 2023-05-15 DIAGNOSIS — E03.8 OTHER SPECIFIED HYPOTHYROIDISM: ICD-10-CM

## 2023-05-15 DIAGNOSIS — K59.00 CONSTIPATION, UNSPECIFIED CONSTIPATION TYPE: ICD-10-CM

## 2023-05-15 LAB — HBA1C MFR BLD: 5.7 %

## 2023-05-15 PROCEDURE — 1123F ACP DISCUSS/DSCN MKR DOCD: CPT | Performed by: NURSE PRACTITIONER

## 2023-05-15 PROCEDURE — 99214 OFFICE O/P EST MOD 30 MIN: CPT | Performed by: NURSE PRACTITIONER

## 2023-05-15 PROCEDURE — G8417 CALC BMI ABV UP PARAM F/U: HCPCS | Performed by: NURSE PRACTITIONER

## 2023-05-15 PROCEDURE — 1036F TOBACCO NON-USER: CPT | Performed by: NURSE PRACTITIONER

## 2023-05-15 PROCEDURE — 3075F SYST BP GE 130 - 139MM HG: CPT | Performed by: NURSE PRACTITIONER

## 2023-05-15 PROCEDURE — 1090F PRES/ABSN URINE INCON ASSESS: CPT | Performed by: NURSE PRACTITIONER

## 2023-05-15 PROCEDURE — G8427 DOCREV CUR MEDS BY ELIG CLIN: HCPCS | Performed by: NURSE PRACTITIONER

## 2023-05-15 PROCEDURE — 83036 HEMOGLOBIN GLYCOSYLATED A1C: CPT | Performed by: NURSE PRACTITIONER

## 2023-05-15 PROCEDURE — G8399 PT W/DXA RESULTS DOCUMENT: HCPCS | Performed by: NURSE PRACTITIONER

## 2023-05-15 PROCEDURE — 3078F DIAST BP <80 MM HG: CPT | Performed by: NURSE PRACTITIONER

## 2023-05-15 PROCEDURE — 3044F HG A1C LEVEL LT 7.0%: CPT | Performed by: NURSE PRACTITIONER

## 2023-05-15 ASSESSMENT — ENCOUNTER SYMPTOMS
BLOOD IN STOOL: 0
VOMITING: 0
TROUBLE SWALLOWING: 0
SHORTNESS OF BREATH: 0
ABDOMINAL PAIN: 0
COUGH: 0
EYE ITCHING: 0
DIARRHEA: 0
SORE THROAT: 0
STRIDOR: 0
WHEEZING: 0
CHOKING: 0
COLOR CHANGE: 0
EYE DISCHARGE: 0
NAUSEA: 0
CONSTIPATION: 1
ABDOMINAL DISTENTION: 0

## 2023-05-15 NOTE — PATIENT INSTRUCTIONS
Edema;  watch salt;  compression stockings   Hypothyroidism;  only take the 200mcg 5 days a week and recheck TSH in 6 weeks;    Constipation;  add fiber to your   Elevated glucose; watch carbs;  and exercise;   Vit d def;  stable with current dose   Insomnia;  stable with trazodone    Hypertension  stable with HCTZ daily

## 2023-05-31 ENCOUNTER — TELEPHONE (OUTPATIENT)
Dept: INTERNAL MEDICINE | Age: 78
End: 2023-05-31

## 2023-06-05 ENCOUNTER — OFFICE VISIT (OUTPATIENT)
Dept: INTERNAL MEDICINE | Age: 78
End: 2023-06-05
Payer: MEDICARE

## 2023-06-05 VITALS — TEMPERATURE: 80 F | OXYGEN SATURATION: 96 % | SYSTOLIC BLOOD PRESSURE: 128 MMHG | DIASTOLIC BLOOD PRESSURE: 68 MMHG

## 2023-06-05 DIAGNOSIS — F34.1 PERSISTENT DEPRESSIVE DISORDER: ICD-10-CM

## 2023-06-05 PROCEDURE — 99212 OFFICE O/P EST SF 10 MIN: CPT | Performed by: NURSE PRACTITIONER

## 2023-06-05 PROCEDURE — 3074F SYST BP LT 130 MM HG: CPT | Performed by: NURSE PRACTITIONER

## 2023-06-05 PROCEDURE — G8399 PT W/DXA RESULTS DOCUMENT: HCPCS | Performed by: NURSE PRACTITIONER

## 2023-06-05 PROCEDURE — G8417 CALC BMI ABV UP PARAM F/U: HCPCS | Performed by: NURSE PRACTITIONER

## 2023-06-05 PROCEDURE — 1036F TOBACCO NON-USER: CPT | Performed by: NURSE PRACTITIONER

## 2023-06-05 PROCEDURE — 1123F ACP DISCUSS/DSCN MKR DOCD: CPT | Performed by: NURSE PRACTITIONER

## 2023-06-05 PROCEDURE — 1090F PRES/ABSN URINE INCON ASSESS: CPT | Performed by: NURSE PRACTITIONER

## 2023-06-05 PROCEDURE — 3078F DIAST BP <80 MM HG: CPT | Performed by: NURSE PRACTITIONER

## 2023-06-05 PROCEDURE — G8427 DOCREV CUR MEDS BY ELIG CLIN: HCPCS | Performed by: NURSE PRACTITIONER

## 2023-06-05 ASSESSMENT — ENCOUNTER SYMPTOMS
COUGH: 0
COLOR CHANGE: 0
SORE THROAT: 0
NAUSEA: 0
DIARRHEA: 0
BLOOD IN STOOL: 0
CHOKING: 0
EYE ITCHING: 0
ABDOMINAL PAIN: 0
CONSTIPATION: 0
ABDOMINAL DISTENTION: 0
EYE DISCHARGE: 0
SHORTNESS OF BREATH: 0
WHEEZING: 0
TROUBLE SWALLOWING: 0
VOMITING: 0
STRIDOR: 0

## 2023-06-05 NOTE — PATIENT INSTRUCTIONS
Depression;  refer to Dr Amador Gaytan per patient request \;  she denies any harmful feelings currently

## 2023-06-05 NOTE — PROGRESS NOTES
05/08/2023    MCH 31.8 (H) 05/08/2023    MCHC 34.2 05/08/2023    RDW 15.5 (H) 05/08/2023     Lab Results   Component Value Date    VITD25 59.5 05/08/2023       Subjective:      Review of Systems   Constitutional:  Negative for fatigue, fever and unexpected weight change. HENT:  Negative for ear discharge, ear pain, mouth sores, sore throat and trouble swallowing. Eyes:  Negative for discharge, itching and visual disturbance. Respiratory:  Negative for cough, choking, shortness of breath, wheezing and stridor. Cardiovascular:  Negative for chest pain, palpitations and leg swelling. Gastrointestinal:  Negative for abdominal distention, abdominal pain, blood in stool, constipation, diarrhea, nausea and vomiting. Endocrine: Negative for cold intolerance, polydipsia and polyuria. Genitourinary:  Negative for difficulty urinating, dysuria, frequency and urgency. Musculoskeletal:  Negative for arthralgias and gait problem. Skin:  Negative for color change and rash. Allergic/Immunologic: Negative for food allergies and immunocompromised state. Neurological:  Negative for dizziness, tremors, syncope, speech difficulty, weakness and headaches. Hematological:  Negative for adenopathy. Does not bruise/bleed easily. Psychiatric/Behavioral:  Positive for dysphoric mood. Negative for confusion and hallucinations. Objective:     Physical Exam  Constitutional:       General: She is not in acute distress. Appearance: She is well-developed. HENT:      Head: Normocephalic and atraumatic. Eyes:      General: No scleral icterus. Right eye: No discharge. Left eye: No discharge. Pupils: Pupils are equal, round, and reactive to light. Neck:      Thyroid: No thyromegaly. Vascular: No JVD. Cardiovascular:      Rate and Rhythm: Normal rate and regular rhythm. Heart sounds: Normal heart sounds. No murmur heard.   Pulmonary:      Effort: Pulmonary effort is normal. No

## 2023-08-15 ENCOUNTER — OFFICE VISIT (OUTPATIENT)
Dept: INTERNAL MEDICINE | Age: 78
End: 2023-08-15
Payer: MEDICARE

## 2023-08-15 VITALS
BODY MASS INDEX: 35.82 KG/M2 | DIASTOLIC BLOOD PRESSURE: 68 MMHG | HEIGHT: 65 IN | WEIGHT: 215 LBS | OXYGEN SATURATION: 97 % | SYSTOLIC BLOOD PRESSURE: 120 MMHG | HEART RATE: 72 BPM

## 2023-08-15 DIAGNOSIS — D64.9 ANEMIA, UNSPECIFIED TYPE: Primary | ICD-10-CM

## 2023-08-15 DIAGNOSIS — B49 FUNGUS DISEASE: ICD-10-CM

## 2023-08-15 DIAGNOSIS — R60.0 LOCALIZED EDEMA: ICD-10-CM

## 2023-08-15 PROCEDURE — G8417 CALC BMI ABV UP PARAM F/U: HCPCS | Performed by: NURSE PRACTITIONER

## 2023-08-15 PROCEDURE — G8399 PT W/DXA RESULTS DOCUMENT: HCPCS | Performed by: NURSE PRACTITIONER

## 2023-08-15 PROCEDURE — 1036F TOBACCO NON-USER: CPT | Performed by: NURSE PRACTITIONER

## 2023-08-15 PROCEDURE — 3074F SYST BP LT 130 MM HG: CPT | Performed by: NURSE PRACTITIONER

## 2023-08-15 PROCEDURE — 3078F DIAST BP <80 MM HG: CPT | Performed by: NURSE PRACTITIONER

## 2023-08-15 PROCEDURE — 1090F PRES/ABSN URINE INCON ASSESS: CPT | Performed by: NURSE PRACTITIONER

## 2023-08-15 PROCEDURE — 99213 OFFICE O/P EST LOW 20 MIN: CPT | Performed by: NURSE PRACTITIONER

## 2023-08-15 PROCEDURE — 1123F ACP DISCUSS/DSCN MKR DOCD: CPT | Performed by: NURSE PRACTITIONER

## 2023-08-15 PROCEDURE — G8427 DOCREV CUR MEDS BY ELIG CLIN: HCPCS | Performed by: NURSE PRACTITIONER

## 2023-08-15 RX ORDER — BUMETANIDE 0.5 MG/1
0.5 TABLET ORAL EVERY MORNING
Qty: 180 TABLET | Refills: 1
Start: 2023-08-15

## 2023-08-15 RX ORDER — CLOTRIMAZOLE 1 %
CREAM (GRAM) TOPICAL
Qty: 14 G | Refills: 1 | Status: SHIPPED | OUTPATIENT
Start: 2023-08-15 | End: 2023-08-22

## 2023-08-15 ASSESSMENT — ENCOUNTER SYMPTOMS
COUGH: 0
SORE THROAT: 0
ABDOMINAL DISTENTION: 0
EYE DISCHARGE: 0
CHOKING: 0
DIARRHEA: 0
EYE ITCHING: 0
BLOOD IN STOOL: 0
CONSTIPATION: 0
NAUSEA: 0
COLOR CHANGE: 0
STRIDOR: 0
SHORTNESS OF BREATH: 0
TROUBLE SWALLOWING: 0
ABDOMINAL PAIN: 0
VOMITING: 0
WHEEZING: 0

## 2023-08-15 NOTE — PROGRESS NOTES
SpO2 97 96 98 98 97 97   Weight 215 lb - 215 lb - - 209 lb   Height 5' 5\" - 5' 5\" - - 5' 5\"   Body Mass Index 35.78 kg/m2 - 35.78 kg/m2 - - 34.78 kg/m2   Pain Level - - - - - -       Family History   Problem Relation Age of Onset    Heart Disease Mother     Breast Cancer Maternal Aunt 67    Breast Cancer Maternal Aunt 74    Stomach Cancer Maternal Uncle 72        Great Uncle    Colon Polyps Neg Hx     Colon Cancer Neg Hx        Social History     Tobacco Use    Smoking status: Never    Smokeless tobacco: Never   Substance Use Topics    Alcohol use: Never      Current Outpatient Medications   Medication Sig Dispense Refill    clotrimazole (LOTRIMIN AF) 1 % cream Apply topically 2 times daily. 14 g 1    bumetanide (BUMEX) 0.5 MG tablet Take 1 tablet by mouth every morning 180 tablet 1    levothyroxine (SYNTHROID) 200 MCG tablet TAKE 1 TABLET BY MOUTH EVERY DAY 30 tablet 3    vitamin D (ERGOCALCIFEROL) 1.25 MG (67749 UT) CAPS capsule Take 1 capsule by mouth once a week 12 capsule 1    metFORMIN (GLUCOPHAGE) 500 MG tablet Take 1 tablet by mouth 2 times daily (with meals) 180 tablet 1    hydroCHLOROthiazide (MICROZIDE) 12.5 MG capsule TAKE 1 CAPSULE BY MOUTH EVERY DAY 30 capsule 3    b complex vitamins capsule Take 1 capsule by mouth daily      traZODone (DESYREL) 50 MG tablet ONE TABLET NIGHTLY AS NEEDED FOR SLEEP (Patient not taking: Reported on 8/15/2023) 30 tablet 3     No current facility-administered medications for this visit.      No Known Allergies    Health Maintenance   Topic Date Due    Shingles vaccine (1 of 2) Never done    Annual Wellness Visit (AWV)  08/09/2023    DTaP/Tdap/Td vaccine (1 - Tdap) 09/05/2023 (Originally 5/10/1964)    Pneumococcal 65+ years Vaccine (1 - PCV) 04/15/2024 (Originally 5/10/1951)    Flu vaccine (1) 06/30/2024 (Originally 8/1/2023)    COVID-19 Vaccine (4 - Booster for [place] series) 08/12/2024 (Originally 11/18/2021)    Depression Monitoring  02/08/2024    DEXA (modify frequency

## 2023-08-15 NOTE — PATIENT INSTRUCTIONS
Legs swelling;   start taking the bumex you have at home twice daily for 3 days then daily;  continue the HCTZ 12.5 that you are already taking    COMPRESSION stocking will certainly help with the swelling but you will need to put them on before you get out of bed or when they are not swollen;  you can get [facility] moderate compresssion from [facility]   Fungal rash;   clotrimozole to the rash 3-4 times a day til clear

## 2023-08-17 RX ORDER — LEVOTHYROXINE SODIUM 0.2 MG/1
TABLET ORAL
Qty: 30 TABLET | Refills: 3 | Status: SHIPPED | OUTPATIENT
Start: 2023-08-17

## 2023-08-17 NOTE — TELEPHONE ENCOUNTER
Zane Nguyen called requesting a refill of the below medication which has been pended for you:     Requested Prescriptions     Pending Prescriptions Disp Refills    levothyroxine (SYNTHROID) 200 MCG tablet [Pharmacy Med Name: LEVOTHYROXINE 200 MCG TABLET] 30 tablet 3     Sig: TAKE 1 TABLET BY MOUTH EVERY DAY       Last Appointment Date: 8/15/2023  Next Appointment Date: 9/19/2023    No Known Allergies

## 2023-08-18 NOTE — TELEPHONE ENCOUNTER
[facility] called requesting a refill of the below medication which has been pended for you:     Requested Prescriptions     Pending Prescriptions Disp Refills    hydroCHLOROthiazide (MICROZIDE) 12.5 MG capsule [Pharmacy Med Name: HYDROCHLOROTHIAZIDE 12.5 MG CP] 30 capsule 3     Sig: TAKE 1 CAPSULE BY MOUTH EVERY DAY       Last Appointment Date: 8/15/2023  Next Appointment Date: 9/19/2023    No Known Allergies

## 2023-08-21 RX ORDER — HYDROCHLOROTHIAZIDE 12.5 MG/1
CAPSULE, GELATIN COATED ORAL
Qty: 30 CAPSULE | Refills: 3 | Status: SHIPPED | OUTPATIENT
Start: 2023-08-21

## 2023-09-12 DIAGNOSIS — K59.00 CONSTIPATION, UNSPECIFIED CONSTIPATION TYPE: ICD-10-CM

## 2023-09-12 DIAGNOSIS — D64.9 ANEMIA, UNSPECIFIED TYPE: ICD-10-CM

## 2023-09-12 DIAGNOSIS — E03.9 HYPOTHYROIDISM, UNSPECIFIED TYPE: ICD-10-CM

## 2023-09-12 DIAGNOSIS — R60.0 LOCALIZED EDEMA: ICD-10-CM

## 2023-09-12 DIAGNOSIS — R73.9 HYPERGLYCEMIA: ICD-10-CM

## 2023-09-12 DIAGNOSIS — E55.9 VITAMIN D DEFICIENCY: ICD-10-CM

## 2023-09-12 LAB
25(OH)D3 SERPL-MCNC: 66.8 NG/ML
ALBUMIN SERPL-MCNC: 3.4 G/DL (ref 3.5–5.2)
ALP SERPL-CCNC: 128 U/L (ref 35–104)
ALT SERPL-CCNC: 19 U/L (ref 5–33)
ANION GAP SERPL CALCULATED.3IONS-SCNC: 8 MMOL/L (ref 7–19)
AST SERPL-CCNC: 36 U/L (ref 5–32)
BASOPHILS # BLD: 0 K/UL (ref 0–0.2)
BASOPHILS NFR BLD: 0.4 % (ref 0–1)
BILIRUB SERPL-MCNC: 1.5 MG/DL (ref 0.2–1.2)
BUN SERPL-MCNC: 18 MG/DL (ref 8–23)
CALCIUM SERPL-MCNC: 9 MG/DL (ref 8.8–10.2)
CHLORIDE SERPL-SCNC: 104 MMOL/L (ref 98–111)
CO2 SERPL-SCNC: 29 MMOL/L (ref 22–29)
CREAT SERPL-MCNC: 0.8 MG/DL (ref 0.5–0.9)
EOSINOPHIL # BLD: 0.1 K/UL (ref 0–0.6)
EOSINOPHIL NFR BLD: 3.1 % (ref 0–5)
ERYTHROCYTE [DISTWIDTH] IN BLOOD BY AUTOMATED COUNT: 15.3 % (ref 11.5–14.5)
GLUCOSE SERPL-MCNC: 118 MG/DL (ref 74–109)
HBA1C MFR BLD: 5.4 % (ref 4–6)
HCT VFR BLD AUTO: 37 % (ref 37–47)
HGB BLD-MCNC: 12.4 G/DL (ref 12–16)
IMM GRANULOCYTES # BLD: 0 K/UL
LYMPHOCYTES # BLD: 0.9 K/UL (ref 1.1–4.5)
LYMPHOCYTES NFR BLD: 20 % (ref 20–40)
MAGNESIUM SERPL-MCNC: 1.8 MG/DL (ref 1.6–2.4)
MCH RBC QN AUTO: 31 PG (ref 27–31)
MCHC RBC AUTO-ENTMCNC: 33.5 G/DL (ref 33–37)
MCV RBC AUTO: 92.5 FL (ref 81–99)
MONOCYTES # BLD: 0.3 K/UL (ref 0–0.9)
MONOCYTES NFR BLD: 5.5 % (ref 0–10)
NEUTROPHILS # BLD: 3.2 K/UL (ref 1.5–7.5)
NEUTS SEG NFR BLD: 70.6 % (ref 50–65)
PLATELET # BLD AUTO: 80 K/UL (ref 130–400)
PMV BLD AUTO: 10.9 FL (ref 9.4–12.3)
POTASSIUM SERPL-SCNC: 4.3 MMOL/L (ref 3.5–5)
PROT SERPL-MCNC: 6.6 G/DL (ref 6.6–8.7)
RBC # BLD AUTO: 4 M/UL (ref 4.2–5.4)
SODIUM SERPL-SCNC: 141 MMOL/L (ref 136–145)
TSH SERPL DL<=0.005 MIU/L-ACNC: 0.01 UIU/ML (ref 0.27–4.2)
VIT B12 SERPL-MCNC: 1067 PG/ML (ref 211–946)
WBC # BLD AUTO: 4.5 K/UL (ref 4.8–10.8)

## 2023-09-19 ENCOUNTER — OFFICE VISIT (OUTPATIENT)
Dept: INTERNAL MEDICINE | Age: 78
End: 2023-09-19
Payer: MEDICARE

## 2023-09-19 VITALS
SYSTOLIC BLOOD PRESSURE: 134 MMHG | WEIGHT: 220 LBS | BODY MASS INDEX: 36.65 KG/M2 | HEART RATE: 76 BPM | OXYGEN SATURATION: 98 % | DIASTOLIC BLOOD PRESSURE: 60 MMHG | HEIGHT: 65 IN

## 2023-09-19 DIAGNOSIS — S02.2XXK CLOSED FRACTURE OF NASAL BONE WITH NONUNION, SUBSEQUENT ENCOUNTER: ICD-10-CM

## 2023-09-19 DIAGNOSIS — R60.0 LOCALIZED EDEMA: ICD-10-CM

## 2023-09-19 DIAGNOSIS — F51.01 PRIMARY INSOMNIA: ICD-10-CM

## 2023-09-19 DIAGNOSIS — I10 PRIMARY HYPERTENSION: ICD-10-CM

## 2023-09-19 DIAGNOSIS — E11.9 CONTROLLED TYPE 2 DIABETES MELLITUS WITHOUT COMPLICATION, WITHOUT LONG-TERM CURRENT USE OF INSULIN (HCC): ICD-10-CM

## 2023-09-19 DIAGNOSIS — E03.8 OTHER SPECIFIED HYPOTHYROIDISM: ICD-10-CM

## 2023-09-19 DIAGNOSIS — Z00.00 MEDICARE ANNUAL WELLNESS VISIT, SUBSEQUENT: Primary | ICD-10-CM

## 2023-09-19 DIAGNOSIS — E55.9 VITAMIN D DEFICIENCY: ICD-10-CM

## 2023-09-19 PROCEDURE — 99214 OFFICE O/P EST MOD 30 MIN: CPT | Performed by: NURSE PRACTITIONER

## 2023-09-19 PROCEDURE — 3075F SYST BP GE 130 - 139MM HG: CPT | Performed by: NURSE PRACTITIONER

## 2023-09-19 PROCEDURE — 3044F HG A1C LEVEL LT 7.0%: CPT | Performed by: NURSE PRACTITIONER

## 2023-09-19 PROCEDURE — 1123F ACP DISCUSS/DSCN MKR DOCD: CPT | Performed by: NURSE PRACTITIONER

## 2023-09-19 PROCEDURE — 3078F DIAST BP <80 MM HG: CPT | Performed by: NURSE PRACTITIONER

## 2023-09-19 PROCEDURE — G0439 PPPS, SUBSEQ VISIT: HCPCS | Performed by: NURSE PRACTITIONER

## 2023-09-19 ASSESSMENT — PATIENT HEALTH QUESTIONNAIRE - PHQ9
10. IF YOU CHECKED OFF ANY PROBLEMS, HOW DIFFICULT HAVE THESE PROBLEMS MADE IT FOR YOU TO DO YOUR WORK, TAKE CARE OF THINGS AT HOME, OR GET ALONG WITH OTHER PEOPLE: 1
7. TROUBLE CONCENTRATING ON THINGS, SUCH AS READING THE NEWSPAPER OR WATCHING TELEVISION: 1
SUM OF ALL RESPONSES TO PHQ QUESTIONS 1-9: 9
SUM OF ALL RESPONSES TO PHQ9 QUESTIONS 1 & 2: 2
SUM OF ALL RESPONSES TO PHQ QUESTIONS 1-9: 8
SUM OF ALL RESPONSES TO PHQ QUESTIONS 1-9: 9
5. POOR APPETITE OR OVEREATING: 1
1. LITTLE INTEREST OR PLEASURE IN DOING THINGS: 1
3. TROUBLE FALLING OR STAYING ASLEEP: 1
8. MOVING OR SPEAKING SO SLOWLY THAT OTHER PEOPLE COULD HAVE NOTICED. OR THE OPPOSITE, BEING SO FIGETY OR RESTLESS THAT YOU HAVE BEEN MOVING AROUND A LOT MORE THAN USUAL: 1
SUM OF ALL RESPONSES TO PHQ QUESTIONS 1-9: 9
4. FEELING TIRED OR HAVING LITTLE ENERGY: 1
2. FEELING DOWN, DEPRESSED OR HOPELESS: 1
9. THOUGHTS THAT YOU WOULD BE BETTER OFF DEAD, OR OF HURTING YOURSELF: 1
6. FEELING BAD ABOUT YOURSELF - OR THAT YOU ARE A FAILURE OR HAVE LET YOURSELF OR YOUR FAMILY DOWN: 1

## 2023-09-19 ASSESSMENT — LIFESTYLE VARIABLES
HOW OFTEN DO YOU HAVE A DRINK CONTAINING ALCOHOL: NEVER
HOW MANY STANDARD DRINKS CONTAINING ALCOHOL DO YOU HAVE ON A TYPICAL DAY: PATIENT DOES NOT DRINK

## 2023-09-19 ASSESSMENT — COLUMBIA-SUICIDE SEVERITY RATING SCALE - C-SSRS
6. HAVE YOU EVER DONE ANYTHING, STARTED TO DO ANYTHING, OR PREPARED TO DO ANYTHING TO END YOUR LIFE?: NO
1. WITHIN THE PAST MONTH, HAVE YOU WISHED YOU WERE DEAD OR WISHED YOU COULD GO TO SLEEP AND NOT WAKE UP?: NO
2. HAVE YOU ACTUALLY HAD ANY THOUGHTS OF KILLING YOURSELF?: NO

## 2023-09-19 ASSESSMENT — ENCOUNTER SYMPTOMS
NAUSEA: 0
VOMITING: 0
SORE THROAT: 0
COLOR CHANGE: 0
CHOKING: 0
DIARRHEA: 0
STRIDOR: 0
BLOOD IN STOOL: 0
TROUBLE SWALLOWING: 0
COUGH: 0
CONSTIPATION: 0
ABDOMINAL DISTENTION: 0
WHEEZING: 0
EYE DISCHARGE: 0
SHORTNESS OF BREATH: 0
EYE ITCHING: 0
ABDOMINAL PAIN: 0

## 2023-09-19 NOTE — PROGRESS NOTES
[facility] PHYSICIAN SERVICES  [facility] INTERNAL MEDICINE  [address] [facility]  [address] [address]  Dept: [phone]  Dept Fax: [phone]: [phone]    Burt Carlisle (:  1945) is a 70 y.o. female,Established patient  with green, here for evaluation of the following chief complaint(s): Medicare AWV      Burt Carlisle is a 70 y.o. female who presents today for her medical conditions/complaints as noted below. Burt Carlisle is c/byron Medicare AWV        HPI:     Chief Complaint   Patient presents with    Medicare AWV     @pt is alone     HPI    Edema  that goes down at night; but not all the way;  then worsens as the day goes on ; she has had this for decades but she feels it is worsening over the last 7 years ;  she has had a ittle luck with the bumex and did go down 10 pounds but she has gained it all back;    2. Hypothyroidism; taking 200 mcg every am as directed;   but level is  0.010     TYPE II DM Elevate glucose sugar is 157   a1c is 5.7  she is on metformin 500 bid   5 Hypertension  ; stable with  HCTZ     Vit d def;  stable with 50,000 weekly  level is normal   Insomnia;  stable with trazodone 50 mg at bedtime    8. Fall with nasal fracture; it is obviously crooked.   She has had some problems with her sinuses since a fall she did see ENT but she just needs to go back up there with them again    Past Medical History:   Diagnosis Date    Anxiety     Bruising     Diabetes mellitus ([address])     Edema     Fracture of nasal bone     History of cellulitis     History of constipation     medication induced    Hypertension     Non-alcoholic cirrhosis ([address])     SDH (subdural hematoma) ([address]) 10/30/2021    small, post fall ([place]    Sleep disorder     Splenomegaly     Thyroid disease       Past Surgical History:   Procedure Laterality Date    CHOLECYSTECTOMY      COLONOSCOPY      normal, per pt    COLONOSCOPY N/A 2023    Dr Colton Lucero x 1,

## 2023-09-19 NOTE — ASSESSMENT & PLAN NOTE
We are going to try to get her the new compression device to have in her home to help with the lymphedema we measured today and will need to measure again in 1 month

## 2023-09-19 NOTE — ASSESSMENT & PLAN NOTE
You do need to go back up to ENT and see about them helping you with your nose because is obviously dislocated.   Also that is probably has something to do with the feeling that you are having in your ears as well with your sinuses

## 2023-10-10 NOTE — PROGRESS NOTES
unremarkable morphology  No overt dysplasia. No increase in blasts. Stainable storage iron present with no ring sideroblasts  FLOW cytometry: Unremarkable  FISH: Negative for MDS  Cytogenetics: Normal female karyotype  JAK2 mutation: Negative  Thrombocytopenia along with adequate number of megakaryocytes can be seen in peripheral destruction/sequestration of platelets.       Past Medical History:   Diagnosis Date    Anxiety     Bruising     Diabetes mellitus ([address])     Edema     Fracture of nasal bone     History of cellulitis     History of constipation     medication induced    Hypertension     Non-alcoholic cirrhosis (HCC)     SDH (subdural hematoma) ([address]) 10/30/2021    small, post fall ([place], [place]    Sleep disorder     Splenomegaly     Thyroid disease      Past Surgical History:   Procedure Laterality Date    CHOLECYSTECTOMY  1978    COLONOSCOPY  2014    normal, per pt    COLONOSCOPY N/A 04/17/2023    Dr Acevedo Cords x 1, 5 yr recall    HYSTERECTOMY (CERVIX STATUS UNKNOWN)  2000    [facility] LOC BREAST BIOPSY RIGHT Right 04/27/2022    [facility] STEROTACTIC LOC BREAST BIOPSY RIGHT 4/27/2022 [facility] [address] [place]    OVARY REMOVAL Bilateral 2000    AGE 55       Current Outpatient Medications:     escitalopram (LEXAPRO) 20 MG tablet, TAKE 1 TABLET BY MOUTH EVERY DAY EVERY MORNING, Disp: , Rfl:     traZODone (DESYREL) 50 MG tablet, TAKE 1 TABLET BY MOUTH EVERY DAY EVERY EVENING, Disp: , Rfl:     hydroCHLOROthiazide (MICROZIDE) 12.5 MG capsule, TAKE 1 CAPSULE BY MOUTH EVERY DAY, Disp: 30 capsule, Rfl: 3    levothyroxine (SYNTHROID) 200 MCG tablet, TAKE 1 TABLET BY MOUTH EVERY DAY, Disp: 30 tablet, Rfl: 3    bumetanide (BUMEX) 0.5 MG tablet, Take 1 tablet by mouth every morning, Disp: 180 tablet, Rfl: 1    vitamin D (ERGOCALCIFEROL) 1.25 MG (74030 UT) CAPS capsule, Take 1 capsule by mouth once a week, Disp: 12 capsule, Rfl: 1    metFORMIN (GLUCOPHAGE) 500 MG tablet, Take 1 tablet by mouth 2 times daily

## 2023-10-11 ENCOUNTER — TELEPHONE (OUTPATIENT)
Dept: HEMATOLOGY | Age: 78
End: 2023-10-11

## 2023-10-11 DIAGNOSIS — D72.819 LEUKOPENIA, UNSPECIFIED TYPE: Primary | ICD-10-CM

## 2023-10-11 DIAGNOSIS — D69.6 THROMBOCYTOPENIA (HCC): ICD-10-CM

## 2023-10-11 DIAGNOSIS — R16.1 SPLENOMEGALY: ICD-10-CM

## 2023-10-12 ENCOUNTER — HOSPITAL ENCOUNTER (OUTPATIENT)
Dept: INFUSION THERAPY | Age: 78
Discharge: HOME | End: 2023-10-12
Payer: MEDICARE

## 2023-10-12 ENCOUNTER — OFFICE VISIT (OUTPATIENT)
Dept: HEMATOLOGY | Age: 78
End: 2023-10-12
Payer: MEDICARE

## 2023-10-12 VITALS
DIASTOLIC BLOOD PRESSURE: 80 MMHG | BODY MASS INDEX: 37.7 KG/M2 | HEIGHT: 65 IN | SYSTOLIC BLOOD PRESSURE: 138 MMHG | HEART RATE: 78 BPM | TEMPERATURE: 98.3 F | OXYGEN SATURATION: 95 % | WEIGHT: 226.3 LBS

## 2023-10-12 DIAGNOSIS — E03.9 HYPOTHYROIDISM (ACQUIRED): ICD-10-CM

## 2023-10-12 DIAGNOSIS — D72.819 LEUKOPENIA, UNSPECIFIED TYPE: ICD-10-CM

## 2023-10-12 DIAGNOSIS — D69.6 THROMBOCYTOPENIA (HCC): Primary | ICD-10-CM

## 2023-10-12 DIAGNOSIS — R16.1 SPLENOMEGALY: ICD-10-CM

## 2023-10-12 DIAGNOSIS — D69.6 THROMBOCYTOPENIA (HCC): ICD-10-CM

## 2023-10-12 DIAGNOSIS — Z71.89 CARE PLAN DISCUSSED WITH PATIENT: ICD-10-CM

## 2023-10-12 DIAGNOSIS — K75.81 NASH (NONALCOHOLIC STEATOHEPATITIS): ICD-10-CM

## 2023-10-12 LAB
BASOPHILS # BLD: 0.04 K/UL (ref 0.01–0.08)
BASOPHILS NFR BLD: 0.8 % (ref 0.1–1.2)
EOSINOPHIL # BLD: 0.12 K/UL (ref 0.04–0.54)
EOSINOPHIL NFR BLD: 2.3 % (ref 0.7–7)
ERYTHROCYTE [DISTWIDTH] IN BLOOD BY AUTOMATED COUNT: 15.9 % (ref 11.7–14.4)
HCT VFR BLD AUTO: 32.7 % (ref 34.1–44.9)
HGB BLD-MCNC: 11.5 G/DL (ref 11.2–15.7)
LYMPHOCYTES # BLD: 1.06 K/UL (ref 1.18–3.74)
LYMPHOCYTES NFR BLD: 20.7 % (ref 19.3–53.1)
MCH RBC QN AUTO: 30.7 PG (ref 25.6–32.2)
MCHC RBC AUTO-ENTMCNC: 35.2 G/DL (ref 32.3–35.5)
MCV RBC AUTO: 87.2 FL (ref 79.4–94.8)
MONOCYTES # BLD: 0.37 K/UL (ref 0.24–0.82)
MONOCYTES NFR BLD: 7.2 % (ref 4.7–12.5)
NEUTROPHILS # BLD: 3.5 K/UL (ref 1.56–6.13)
NEUTS SEG NFR BLD: 68.2 % (ref 34–71.1)
PLATELET # BLD AUTO: 71 K/UL (ref 182–369)
PMV BLD AUTO: 10.6 FL (ref 7.4–10.4)
RBC # BLD AUTO: 3.75 M/UL (ref 3.93–5.22)
WBC # BLD AUTO: 5.13 K/UL (ref 3.98–10.04)

## 2023-10-12 PROCEDURE — 99211 OFF/OP EST MAY X REQ PHY/QHP: CPT

## 2023-10-12 PROCEDURE — G8484 FLU IMMUNIZE NO ADMIN: HCPCS | Performed by: NURSE PRACTITIONER

## 2023-10-12 PROCEDURE — 85025 COMPLETE CBC W/AUTO DIFF WBC: CPT

## 2023-10-12 PROCEDURE — G8399 PT W/DXA RESULTS DOCUMENT: HCPCS | Performed by: NURSE PRACTITIONER

## 2023-10-12 PROCEDURE — G8427 DOCREV CUR MEDS BY ELIG CLIN: HCPCS | Performed by: NURSE PRACTITIONER

## 2023-10-12 PROCEDURE — 99213 OFFICE O/P EST LOW 20 MIN: CPT | Performed by: NURSE PRACTITIONER

## 2023-10-12 PROCEDURE — 3078F DIAST BP <80 MM HG: CPT | Performed by: NURSE PRACTITIONER

## 2023-10-12 PROCEDURE — 3074F SYST BP LT 130 MM HG: CPT | Performed by: NURSE PRACTITIONER

## 2023-10-12 PROCEDURE — G8417 CALC BMI ABV UP PARAM F/U: HCPCS | Performed by: NURSE PRACTITIONER

## 2023-10-12 PROCEDURE — 1090F PRES/ABSN URINE INCON ASSESS: CPT | Performed by: NURSE PRACTITIONER

## 2023-10-12 PROCEDURE — 1123F ACP DISCUSS/DSCN MKR DOCD: CPT | Performed by: NURSE PRACTITIONER

## 2023-10-12 PROCEDURE — 36415 COLL VENOUS BLD VENIPUNCTURE: CPT

## 2023-10-12 PROCEDURE — 1036F TOBACCO NON-USER: CPT | Performed by: NURSE PRACTITIONER

## 2023-10-12 RX ORDER — ESCITALOPRAM OXALATE 20 MG/1
TABLET ORAL
COMMUNITY
Start: 2023-08-29

## 2023-10-12 RX ORDER — TRAZODONE HYDROCHLORIDE 50 MG/1
TABLET ORAL
COMMUNITY
Start: 2023-09-28

## 2023-10-14 ASSESSMENT — ENCOUNTER SYMPTOMS
DIARRHEA: 0
ABDOMINAL PAIN: 0
CONSTIPATION: 1
NAUSEA: 0
VOMITING: 0
COUGH: 0
EYE ITCHING: 0
TROUBLE SWALLOWING: 0
SHORTNESS OF BREATH: 0
EYE DISCHARGE: 0
WHEEZING: 0
SORE THROAT: 0

## 2023-10-19 ENCOUNTER — OFFICE VISIT (OUTPATIENT)
Dept: INTERNAL MEDICINE | Age: 78
End: 2023-10-19
Payer: MEDICARE

## 2023-10-19 VITALS
WEIGHT: 223 LBS | SYSTOLIC BLOOD PRESSURE: 108 MMHG | HEIGHT: 65 IN | DIASTOLIC BLOOD PRESSURE: 56 MMHG | OXYGEN SATURATION: 98 % | HEART RATE: 72 BPM | BODY MASS INDEX: 37.15 KG/M2

## 2023-10-19 DIAGNOSIS — R60.0 LOCALIZED EDEMA: ICD-10-CM

## 2023-10-19 PROCEDURE — 1123F ACP DISCUSS/DSCN MKR DOCD: CPT | Performed by: NURSE PRACTITIONER

## 2023-10-19 PROCEDURE — G8399 PT W/DXA RESULTS DOCUMENT: HCPCS | Performed by: NURSE PRACTITIONER

## 2023-10-19 PROCEDURE — 1036F TOBACCO NON-USER: CPT | Performed by: NURSE PRACTITIONER

## 2023-10-19 PROCEDURE — G8427 DOCREV CUR MEDS BY ELIG CLIN: HCPCS | Performed by: NURSE PRACTITIONER

## 2023-10-19 PROCEDURE — G8484 FLU IMMUNIZE NO ADMIN: HCPCS | Performed by: NURSE PRACTITIONER

## 2023-10-19 PROCEDURE — 3074F SYST BP LT 130 MM HG: CPT | Performed by: NURSE PRACTITIONER

## 2023-10-19 PROCEDURE — 3078F DIAST BP <80 MM HG: CPT | Performed by: NURSE PRACTITIONER

## 2023-10-19 PROCEDURE — 99212 OFFICE O/P EST SF 10 MIN: CPT | Performed by: NURSE PRACTITIONER

## 2023-10-19 PROCEDURE — 1090F PRES/ABSN URINE INCON ASSESS: CPT | Performed by: NURSE PRACTITIONER

## 2023-10-19 PROCEDURE — G8417 CALC BMI ABV UP PARAM F/U: HCPCS | Performed by: NURSE PRACTITIONER

## 2023-10-19 NOTE — PROGRESS NOTES
[address] [facility] INTERNAL MEDICINE  [address] [facility] [address]  [address] [address]  Dept: [phone]  Dept Fax: [phone]: [phone]    Zaid Mcfarlane (:  1945) is a 70 y.o. female,Established patient  with green , here for evaluation of the following chief complaint(s): Other (Leg edema)      Zaid Mcfarlane is a 70 y.o. female who presents today for her medical conditions/complaints as noted below.   Zaid Mcfarlane is c/byron Other (Leg edema)        HPI:     Chief Complaint   Patient presents with    Other     Leg edema     @pt is alone     HPI    Edema;  she returns today for re-eval for peripheral edema;  she has been on bumex and HCTZ  with elevation and compression; to help with this ;  we also measured her for compresstech 30 days ago so we will measure again today and send to the company;  al her measurements are same or worse except the left knee is a little smaller;         Past Medical History:   Diagnosis Date    Anxiety     Bruising     Diabetes mellitus ([address])     Edema     Fracture of nasal bone     History of cellulitis     History of constipation     medication induced    Hypertension     Non-alcoholic cirrhosis ([address])     SDH (subdural hematoma) ([address]) 10/30/2021    small, post fall ([place] [address]    Sleep disorder     Splenomegaly     Thyroid disease       Past Surgical History:   Procedure Laterality Date    CHOLECYSTECTOMY      COLONOSCOPY      normal, per pt    COLONOSCOPY N/A 2023    Dr Brooklynn Wilkinson x 1, 5 yr recall    HYSTERECTOMY ( [facility])      [facility] LOC BREAST BIOPSY RIGHT Right 2022    [facility] STEROTACT[facility] LOC BREAST BIOPSY RIGHT 2022 [facility] Bilateral 2000    AGE 55           10/19/2023    10:53 AM 10/12/2023     1:32 PM 2023     3:31 PM 8/15/2023     1:52 PM 2023     3:31 PM 5/15/2023    11:24 AM   Vitals   SYSTOLIC [phone]

## 2023-11-01 ENCOUNTER — TELEPHONE (OUTPATIENT)
Dept: GASTROENTEROLOGY | Age: 78
End: 2023-11-01

## 2023-11-01 DIAGNOSIS — K76.0 FATTY LIVER: Primary | ICD-10-CM

## 2023-11-01 NOTE — TELEPHONE ENCOUNTER
----- Message from Will Hernandez, [place] sent at 5/12/2023  9:55 AM CDT -----  Regarding: LIver us and labs  Liver us and labs in 6 months due  per CT APRN

## 2023-11-01 NOTE — TELEPHONE ENCOUNTER
11-01-23  [facility] for pt to call [phone] to sched the US appt. Looks like PCP has lab orders already in epic of pt. If pt gets those soon, we won't need anymore.

## 2023-11-30 ENCOUNTER — HOSPITAL ENCOUNTER (EMERGENCY)
Age: 78
Discharge: HOME | End: 2023-11-30
Payer: MEDICARE

## 2023-11-30 ENCOUNTER — APPOINTMENT (OUTPATIENT)
Dept: GENERAL RADIOLOGY | Age: 78
End: 2023-11-30
Payer: MEDICARE

## 2023-11-30 VITALS
TEMPERATURE: 98.6 F | DIASTOLIC BLOOD PRESSURE: 71 MMHG | HEART RATE: 80 BPM | SYSTOLIC BLOOD PRESSURE: 168 MMHG | RESPIRATION RATE: 17 BRPM | OXYGEN SATURATION: 100 %
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
## 2023-11-30 DIAGNOSIS — R07.89 CHEST WALL TENDERNESS: ICD-10-CM

## 2023-11-30 DIAGNOSIS — M25.512 ACUTE PAIN OF LEFT SHOULDER: ICD-10-CM

## 2023-11-30 PROCEDURE — 71045 X-RAY EXAM CHEST 1 VIEW: CPT

## 2023-11-30 PROCEDURE — 99283 EMERGENCY DEPT VISIT LOW MDM: CPT

## 2023-11-30 PROCEDURE — 73030 X-RAY EXAM OF SHOULDER: CPT

## 2023-11-30 ASSESSMENT — ENCOUNTER SYMPTOMS
BACK PAIN: 0
SHORTNESS OF BREATH: 0
VOMITING: 0
NAUSEA: 0
ABDOMINAL PAIN: 0
PHOTOPHOBIA: 0
DIARRHEA: 0
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
## 2023-12-15 DIAGNOSIS — E03.8 OTHER SPECIFIED HYPOTHYROIDISM: ICD-10-CM

## 2023-12-15 DIAGNOSIS — E55.9 VITAMIN D DEFICIENCY: ICD-10-CM

## 2023-12-15 DIAGNOSIS — R60.0 LOCALIZED EDEMA: ICD-10-CM

## 2023-12-15 DIAGNOSIS — E11.9 CONTROLLED TYPE 2 DIABETES MELLITUS WITHOUT COMPLICATION, WITHOUT LONG-TERM CURRENT USE OF INSULIN (HCC): ICD-10-CM

## 2023-12-15 LAB
25(OH)D3 SERPL-MCNC: 47.5 NG/ML
ALBUMIN SERPL-MCNC: 3.6 G/DL (ref 3.5–5.2)
ALP SERPL-CCNC: 138 U/L (ref 35–104)
ALT SERPL-CCNC: 14 U/L (ref 5–33)
ANION GAP SERPL CALCULATED.3IONS-SCNC: 7 MMOL/L (ref 7–19)
AST SERPL-CCNC: 30 U/L (ref 5–32)
BASOPHILS # BLD: 0 K/UL (ref 0–0.2)
BASOPHILS NFR BLD: 0.6 % (ref 0–1)
BILIRUB SERPL-MCNC: 1.6 MG/DL (ref 0.2–1.2)
BILIRUB UR QL STRIP: NEGATIVE
BUN SERPL-MCNC: 16 MG/DL (ref 8–23)
CALCIUM SERPL-MCNC: 8.9 MG/DL (ref 8.8–10.2)
CHLORIDE SERPL-SCNC: 102 MMOL/L (ref 98–111)
CHOLEST SERPL-MCNC: 157 MG/DL (ref 160–199)
CLARITY UR: CLEAR
CO2 SERPL-SCNC: 30 MMOL/L (ref 22–29)
COLOR UR: YELLOW
CREAT SERPL-MCNC: 0.8 MG/DL (ref 0.5–0.9)
EOSINOPHIL # BLD: 0.2 K/UL (ref 0–0.6)
EOSINOPHIL NFR BLD: 3.3 % (ref 0–5)
ERYTHROCYTE [DISTWIDTH] IN BLOOD BY AUTOMATED COUNT: 16 % (ref 11.5–14.5)
GLUCOSE SERPL-MCNC: 193 MG/DL (ref 74–109)
GLUCOSE UR STRIP.AUTO-MCNC: NEGATIVE MG/DL
HBA1C MFR BLD: 5.1 % (ref 4–6)
HCT VFR BLD AUTO: 35.7 % (ref 37–47)
HDLC SERPL-MCNC: 79 MG/DL (ref 65–121)
HGB BLD-MCNC: 11.8 G/DL (ref 12–16)
HGB UR STRIP.AUTO-MCNC: NEGATIVE MG/L
IMM GRANULOCYTES # BLD: 0.1 K/UL
KETONES UR STRIP.AUTO-MCNC: NEGATIVE MG/DL
LDLC SERPL CALC-MCNC: 63 MG/DL
LEUKOCYTE ESTERASE UR QL STRIP.AUTO: NEGATIVE
LYMPHOCYTES # BLD: 1 K/UL (ref 1.1–4.5)
LYMPHOCYTES NFR BLD: 19.9 % (ref 20–40)
MCH RBC QN AUTO: 32.3 PG (ref 27–31)
MCHC RBC AUTO-ENTMCNC: 33.1 G/DL (ref 33–37)
MCV RBC AUTO: 97.8 FL (ref 81–99)
MONOCYTES # BLD: 0.3 K/UL (ref 0–0.9)
MONOCYTES NFR BLD: 6.4 % (ref 0–10)
NEUTROPHILS # BLD: 3.5 K/UL (ref 1.5–7.5)
NEUTS SEG NFR BLD: 68.3 % (ref 50–65)
NITRITE UR QL STRIP.AUTO: NEGATIVE
PH UR STRIP.AUTO: 7 [PH] (ref 5–8)
PLATELET # BLD AUTO: 75 K/UL (ref 130–400)
PMV BLD AUTO: 10.4 FL (ref 9.4–12.3)
POTASSIUM SERPL-SCNC: 4.1 MMOL/L (ref 3.5–5)
PROT SERPL-MCNC: 6.6 G/DL (ref 6.6–8.7)
PROT UR STRIP.AUTO-MCNC: NEGATIVE MG/DL
RBC # BLD AUTO: 3.65 M/UL (ref 4.2–5.4)
SODIUM SERPL-SCNC: 139 MMOL/L (ref 136–145)
SP GR UR STRIP.AUTO: 1.02 (ref 1–1.03)
TRIGL SERPL-MCNC: 74 MG/DL (ref 0–149)
TSH SERPL DL<=0.005 MIU/L-ACNC: 0.15 UIU/ML (ref 0.27–4.2)
UROBILINOGEN UR STRIP.AUTO-MCNC: 2 E.U./DL
WBC # BLD AUTO: 5.2 K/UL (ref 4.8–10.8)

## 2024-01-02 RX ORDER — HYDROCHLOROTHIAZIDE 12.5 MG/1
CAPSULE, GELATIN COATED ORAL
Qty: 30 CAPSULE | Refills: 3 | Status: SHIPPED | OUTPATIENT
Start: 2024-01-02

## 2024-01-02 RX ORDER — LEVOTHYROXINE SODIUM 0.2 MG/1
TABLET ORAL
Qty: 30 TABLET | Refills: 3 | Status: SHIPPED | OUTPATIENT
Start: 2024-01-02

## 2024-01-02 NOTE — TELEPHONE ENCOUNTER
Yuli called requesting a refill of the below medication which has been pended for you:     Requested Prescriptions     Pending Prescriptions Disp Refills    levothyroxine (SYNTHROID) 200 MCG tablet [Pharmacy Med Name: LEVOTHYROXINE 200 MCG TABLET] 30 tablet 3     Sig: TAKE 1 TABLET BY MOUTH EVERY DAY    hydroCHLOROthiazide (MICROZIDE) 12.5 MG capsule [Pharmacy Med Name: HYDROCHLOROTHIAZIDE 12.5 MG CP] 30 capsule 3     Sig: TAKE 1 CAPSULE BY MOUTH EVERY DAY       Last Appointment Date: 12/19/2023  Next Appointment Date: Visit date not found    No Known Allergies

## 2024-04-11 ENCOUNTER — HOSPITAL ENCOUNTER (OUTPATIENT)
Dept: INFUSION THERAPY | Age: 79
Discharge: HOME | End: 2024-04-11
Payer: MEDICARE

## 2024-04-11 DIAGNOSIS — D72.819 LEUKOPENIA, UNSPECIFIED TYPE: ICD-10-CM

## 2024-04-11 DIAGNOSIS — D69.6 THROMBOCYTOPENIA (HCC): ICD-10-CM

## 2024-04-11 DIAGNOSIS — R16.1 SPLENOMEGALY: ICD-10-CM

## 2024-04-11 LAB
BASOPHILS # BLD: 0.03 K/UL (ref 0.01–0.08)
BASOPHILS NFR BLD: 0.6 % (ref 0.1–1.2)
EOSINOPHIL # BLD: 0.16 K/UL (ref 0.04–0.54)
EOSINOPHIL NFR BLD: 3.4 % (ref 0.7–7)
ERYTHROCYTE [DISTWIDTH] IN BLOOD BY AUTOMATED COUNT: 16.2 % (ref 11.7–14.4)
HCT VFR BLD AUTO: 34.6 % (ref 34.1–44.9)
HGB BLD-MCNC: 11.7 G/DL (ref 11.2–15.7)
LYMPHOCYTES # BLD: 0.91 K/UL (ref 1.18–3.74)
LYMPHOCYTES NFR BLD: 19.4 % (ref 19.3–53.1)
MCH RBC QN AUTO: 31.4 PG (ref 25.6–32.2)
MCHC RBC AUTO-ENTMCNC: 33.8 G/DL (ref 32.3–35.5)
MCV RBC AUTO: 92.8 FL (ref 79.4–94.8)
MONOCYTES # BLD: 0.32 K/UL (ref 0.24–0.82)
MONOCYTES NFR BLD: 6.8 % (ref 4.7–12.5)
NEUTROPHILS # BLD: 3.24 K/UL (ref 1.56–6.13)
NEUTS SEG NFR BLD: 69.4 % (ref 34–71.1)
PLATELET # BLD AUTO: 73 K/UL (ref 182–369)
PMV BLD AUTO: 9.9 FL (ref 7.4–10.4)
RBC # BLD AUTO: 3.73 M/UL (ref 3.93–5.22)
WBC # BLD AUTO: 4.68 K/UL (ref 3.98–10.04)

## 2024-04-11 PROCEDURE — 36415 COLL VENOUS BLD VENIPUNCTURE: CPT

## 2024-04-11 PROCEDURE — 85025 COMPLETE CBC W/AUTO DIFF WBC: CPT

## 2024-04-15 ENCOUNTER — HOSPITAL ENCOUNTER (OUTPATIENT)
Dept: WOMENS IMAGING | Age: 79
Discharge: HOME | End: 2024-04-15
Payer: MEDICARE

## 2024-04-15 VITALS — WEIGHT: 230 LBS | HEIGHT: 65 IN | BODY MASS INDEX: 38.32 KG/M2

## 2024-04-15 DIAGNOSIS — Z78.0 POSTMENOPAUSAL: ICD-10-CM

## 2024-04-15 DIAGNOSIS — Z12.31 ENCOUNTER FOR SCREENING MAMMOGRAM FOR BREAST CANCER: ICD-10-CM

## 2024-04-15 PROCEDURE — 77063 BREAST TOMOSYNTHESIS BI: CPT

## 2024-04-15 PROCEDURE — 77080 DXA BONE DENSITY AXIAL: CPT

## 2024-04-16 ENCOUNTER — HOSPITAL ENCOUNTER (EMERGENCY)
Age: 79
Discharge: HOME | End: 2024-04-17
Attending: EMERGENCY MEDICINE
Payer: MEDICARE

## 2024-04-16 DIAGNOSIS — L03.115 BILATERAL LOWER LEG CELLULITIS: Primary | ICD-10-CM

## 2024-04-16 DIAGNOSIS — L03.116 BILATERAL LOWER LEG CELLULITIS: Primary | ICD-10-CM

## 2024-04-16 LAB
ALBUMIN SERPL-MCNC: 3.7 G/DL (ref 3.5–5.2)
ALP SERPL-CCNC: 136 U/L (ref 35–104)
ALT SERPL-CCNC: 17 U/L (ref 5–33)
ANION GAP SERPL CALCULATED.3IONS-SCNC: 11 MMOL/L (ref 7–19)
AST SERPL-CCNC: 34 U/L (ref 5–32)
BASOPHILS # BLD: 0 K/UL (ref 0–0.2)
BASOPHILS NFR BLD: 0.7 % (ref 0–1)
BILIRUB SERPL-MCNC: 1.6 MG/DL (ref 0.2–1.2)
BNP BLD-MCNC: 448 PG/ML (ref 0–449)
BUN SERPL-MCNC: 16 MG/DL (ref 8–23)
CALCIUM SERPL-MCNC: 9 MG/DL (ref 8.8–10.2)
CHLORIDE SERPL-SCNC: 101 MMOL/L (ref 98–111)
CO2 SERPL-SCNC: 25 MMOL/L (ref 22–29)
CREAT SERPL-MCNC: 0.8 MG/DL (ref 0.5–0.9)
EOSINOPHIL # BLD: 0.2 K/UL (ref 0–0.6)
EOSINOPHIL NFR BLD: 3.8 % (ref 0–5)
ERYTHROCYTE [DISTWIDTH] IN BLOOD BY AUTOMATED COUNT: 16.3 % (ref 11.5–14.5)
GLUCOSE SERPL-MCNC: 169 MG/DL (ref 74–109)
HCT VFR BLD AUTO: 37 % (ref 37–47)
HGB BLD-MCNC: 12.6 G/DL (ref 12–16)
IMM GRANULOCYTES # BLD: 0 K/UL
LACTATE BLDV-SCNC: 1.8 MG/DL (ref 0.5–1.9)
LYMPHOCYTES # BLD: 1.2 K/UL (ref 1.1–4.5)
LYMPHOCYTES NFR BLD: 21.9 % (ref 20–40)
MCH RBC QN AUTO: 31.9 PG (ref 27–31)
MCHC RBC AUTO-ENTMCNC: 34.1 G/DL (ref 33–37)
MCV RBC AUTO: 93.7 FL (ref 81–99)
MONOCYTES # BLD: 0.4 K/UL (ref 0–0.9)
MONOCYTES NFR BLD: 6.4 % (ref 0–10)
NEUTROPHILS # BLD: 3.7 K/UL (ref 1.5–7.5)
NEUTS SEG NFR BLD: 66.7 % (ref 50–65)
PLATELET # BLD AUTO: 75 K/UL (ref 130–400)
PMV BLD AUTO: 10.5 FL (ref 9.4–12.3)
POTASSIUM SERPL-SCNC: 3.4 MMOL/L (ref 3.5–5)
PROT SERPL-MCNC: 7.1 G/DL (ref 6.6–8.7)
RBC # BLD AUTO: 3.95 M/UL (ref 4.2–5.4)
SODIUM SERPL-SCNC: 137 MMOL/L (ref 136–145)
WBC # BLD AUTO: 5.5 K/UL (ref 4.8–10.8)

## 2024-04-16 PROCEDURE — 83880 ASSAY OF NATRIURETIC PEPTIDE: CPT

## 2024-04-16 PROCEDURE — 36415 COLL VENOUS BLD VENIPUNCTURE: CPT

## 2024-04-16 PROCEDURE — 85025 COMPLETE CBC W/AUTO DIFF WBC: CPT

## 2024-04-16 PROCEDURE — 99283 EMERGENCY DEPT VISIT LOW MDM: CPT

## 2024-04-16 PROCEDURE — 83605 ASSAY OF LACTIC ACID: CPT

## 2024-04-16 PROCEDURE — 80053 COMPREHEN METABOLIC PANEL: CPT

## 2024-04-17 ENCOUNTER — OFFICE VISIT (OUTPATIENT)
Dept: INTERNAL MEDICINE | Age: 79
End: 2024-04-17
Payer: MEDICARE

## 2024-04-17 VITALS
HEART RATE: 87 BPM | WEIGHT: 230 LBS | RESPIRATION RATE: 20 BRPM | BODY MASS INDEX: 38.27 KG/M2 | SYSTOLIC BLOOD PRESSURE: 155 MMHG | TEMPERATURE: 97.8 F | OXYGEN SATURATION: 99 % | DIASTOLIC BLOOD PRESSURE: 56 MMHG

## 2024-04-17 VITALS
HEART RATE: 75 BPM | OXYGEN SATURATION: 97 % | BODY MASS INDEX: 38.32 KG/M2 | SYSTOLIC BLOOD PRESSURE: 140 MMHG | DIASTOLIC BLOOD PRESSURE: 70 MMHG | HEIGHT: 65 IN | WEIGHT: 230 LBS

## 2024-04-17 DIAGNOSIS — E66.01 SEVERE OBESITY (BMI 35.0-39.9) WITH COMORBIDITY (HCC): ICD-10-CM

## 2024-04-17 DIAGNOSIS — L55.9 SUNBURN: ICD-10-CM

## 2024-04-17 DIAGNOSIS — L03.90 CHRONIC CELLULITIS: ICD-10-CM

## 2024-04-17 DIAGNOSIS — L80 VITILIGO: Primary | ICD-10-CM

## 2024-04-17 DIAGNOSIS — R60.0 LOCALIZED EDEMA: ICD-10-CM

## 2024-04-17 PROCEDURE — G8417 CALC BMI ABV UP PARAM F/U: HCPCS | Performed by: NURSE PRACTITIONER

## 2024-04-17 PROCEDURE — 1123F ACP DISCUSS/DSCN MKR DOCD: CPT | Performed by: NURSE PRACTITIONER

## 2024-04-17 PROCEDURE — 3078F DIAST BP <80 MM HG: CPT | Performed by: NURSE PRACTITIONER

## 2024-04-17 PROCEDURE — G8427 DOCREV CUR MEDS BY ELIG CLIN: HCPCS | Performed by: NURSE PRACTITIONER

## 2024-04-17 PROCEDURE — 1036F TOBACCO NON-USER: CPT | Performed by: NURSE PRACTITIONER

## 2024-04-17 PROCEDURE — 3077F SYST BP >= 140 MM HG: CPT | Performed by: NURSE PRACTITIONER

## 2024-04-17 PROCEDURE — G8399 PT W/DXA RESULTS DOCUMENT: HCPCS | Performed by: NURSE PRACTITIONER

## 2024-04-17 PROCEDURE — 1090F PRES/ABSN URINE INCON ASSESS: CPT | Performed by: NURSE PRACTITIONER

## 2024-04-17 PROCEDURE — 99214 OFFICE O/P EST MOD 30 MIN: CPT | Performed by: NURSE PRACTITIONER

## 2024-04-17 RX ORDER — CEPHALEXIN 500 MG/1
500 CAPSULE ORAL 3 TIMES DAILY
Qty: 21 CAPSULE | Refills: 0 | Status: SHIPPED | OUTPATIENT
Start: 2024-04-17 | End: 2024-04-24

## 2024-04-17 SDOH — ECONOMIC STABILITY: FOOD INSECURITY: WITHIN THE PAST 12 MONTHS, THE FOOD YOU BOUGHT JUST DIDN'T LAST AND YOU DIDN'T HAVE MONEY TO GET MORE.: NEVER TRUE

## 2024-04-17 SDOH — ECONOMIC STABILITY: FOOD INSECURITY: WITHIN THE PAST 12 MONTHS, YOU WORRIED THAT YOUR FOOD WOULD RUN OUT BEFORE YOU GOT MONEY TO BUY MORE.: NEVER TRUE

## 2024-04-17 SDOH — ECONOMIC STABILITY: INCOME INSECURITY: HOW HARD IS IT FOR YOU TO PAY FOR THE VERY BASICS LIKE FOOD, HOUSING, MEDICAL CARE, AND HEATING?: NOT HARD AT ALL

## 2024-04-17 ASSESSMENT — ENCOUNTER SYMPTOMS
COUGH: 0
DIARRHEA: 0
CHOKING: 0
ABDOMINAL DISTENTION: 0
EYE DISCHARGE: 0
SHORTNESS OF BREATH: 0
CONSTIPATION: 0
BLOOD IN STOOL: 0
STRIDOR: 0
COLOR CHANGE: 0
ABDOMINAL PAIN: 0
WHEEZING: 0
VOMITING: 0
NAUSEA: 0
SORE THROAT: 0
TROUBLE SWALLOWING: 0
EYE ITCHING: 0

## 2024-04-17 ASSESSMENT — PATIENT HEALTH QUESTIONNAIRE - PHQ9
SUM OF ALL RESPONSES TO PHQ QUESTIONS 1-9: 6
3. TROUBLE FALLING OR STAYING ASLEEP: NOT AT ALL
10. IF YOU CHECKED OFF ANY PROBLEMS, HOW DIFFICULT HAVE THESE PROBLEMS MADE IT FOR YOU TO DO YOUR WORK, TAKE CARE OF THINGS AT HOME, OR GET ALONG WITH OTHER PEOPLE: NOT DIFFICULT AT ALL
SUM OF ALL RESPONSES TO PHQ QUESTIONS 1-9: 6
SUM OF ALL RESPONSES TO PHQ QUESTIONS 1-9: 6
7. TROUBLE CONCENTRATING ON THINGS, SUCH AS READING THE NEWSPAPER OR WATCHING TELEVISION: NOT AT ALL
SUM OF ALL RESPONSES TO PHQ9 QUESTIONS 1 & 2: 6
8. MOVING OR SPEAKING SO SLOWLY THAT OTHER PEOPLE COULD HAVE NOTICED. OR THE OPPOSITE, BEING SO FIGETY OR RESTLESS THAT YOU HAVE BEEN MOVING AROUND A LOT MORE THAN USUAL: NOT AT ALL
4. FEELING TIRED OR HAVING LITTLE ENERGY: NOT AT ALL
1. LITTLE INTEREST OR PLEASURE IN DOING THINGS: NEARLY EVERY DAY
9. THOUGHTS THAT YOU WOULD BE BETTER OFF DEAD, OR OF HURTING YOURSELF: NOT AT ALL
2. FEELING DOWN, DEPRESSED OR HOPELESS: NEARLY EVERY DAY
6. FEELING BAD ABOUT YOURSELF - OR THAT YOU ARE A FAILURE OR HAVE LET YOURSELF OR YOUR FAMILY DOWN: NOT AT ALL
SUM OF ALL RESPONSES TO PHQ QUESTIONS 1-9: 6
5. POOR APPETITE OR OVEREATING: NOT AT ALL

## 2024-04-17 NOTE — ED PROVIDER NOTES
[facility] EMERGENCY DEPT  eMERGENCY dEPARTMENT eNCOUnter      Pt Name: Yuli Duran  MRN: [id number]  Birthdate 1945  Date of evaluation: 4/16/2024  Provider: Burton Richardson MD    CHIEF COMPLAINT       Chief Complaint   Patient presents with    Leg Swelling     Bilateral leg swelling and redness, redness spreading for past week         HISTORY OF PRESENT ILLNESS   (Location/Symptom, Timing/Onset,Context/Setting, Quality, Duration, Modifying Factors, Severity)  Note limiting factors.   Yuli Duran is a 78 y.o. female who presents to the emergency department for evaluation regarding bilateral lower extremity swelling and redness.  States that she has noticed some increasing redness over about the past 1 week.  She has not had any fevers or chills.  Notes that she is maintained on diuretics with Bumex and 0.5 mg daily.  She has not had any feelings of shortness of breath or acute chest pain.  Denies any recent oral antibiotics.    HPI    NursingNotes were reviewed.    REVIEW OF SYSTEMS    (2-9 systems for level 4, 10 or more for level 5)     Review of Systems   Constitutional:  Negative for chills and fever.   Respiratory:  Negative for shortness of breath.    Cardiovascular:  Positive for leg swelling. Negative for chest pain.   Gastrointestinal:  Negative for abdominal pain, diarrhea, nausea and vomiting.   Neurological:  Negative for syncope.   All other systems reviewed and are negative.           PAST MEDICALHISTORY     Past Medical History:   Diagnosis Date    Anxiety     Bruising     Diabetes mellitus (HCC)     Edema     Fracture of nasal bone     History of cellulitis     History of constipation     medication induced    Hypertension     Non-alcoholic cirrhosis (HCC)     SDH (subdural hematoma) (HCC) 10/30/2021    small, post fall [facility]    Sleep disorder     Splenomegaly     Thyroid disease          SURGICAL HISTORY       Past Surgical History:   Procedure Laterality Date     2119]   BP Temp Temp src Pulse Respirations SpO2 Height Weight - Scale   (!) 200/86 97.8 °F (36.6 °C) -- 98 18 99 % -- 104.3 kg (230 lb)       Physical Exam    DIAGNOSTIC RESULTS     EKG: All EKG's areinterpreted by the Emergency Department Physician who either signs or Co-signs this chart in the absence of a cardiologist.    ***    RADIOLOGY:  Non-plain film images such as CT, Ultrasound and MRI are read by the radiologist. Plain radiographic images are visualized and preliminarily interpreted bythe emergency physician with the below findings:    ***      No orders to display           LABS:  Labs Reviewed   CBC WITH AUTO DIFFERENTIAL - Abnormal; Notable for the following components:       Result Value    RBC 3.95 (*)     MCH 31.9 (*)     RDW 16.3 (*)     Platelets 75 (*)     Neutrophils % 66.7 (*)     All other components within normal limits   COMPREHENSIVE METABOLIC PANEL - Abnormal; Notable for the following components:    Potassium 3.4 (*)     Glucose 169 (*)     Total Bilirubin 1.6 (*)     Alkaline Phosphatase 136 (*)     AST 34 (*)     All other components within normal limits   BRAIN NATRIURETIC PEPTIDE   LACTATE, SEPSIS   LACTATE, SEPSIS       All other labs were within normal range or not returned as of this dictation.    EMERGENCY DEPARTMENT COURSE and DIFFERENTIAL DIAGNOSIS/MDM:   Vitals:    Vitals:    04/16/24 2119   BP: (!) 200/86   Pulse: 98   Resp: 18   Temp: 97.8 °F (36.6 °C)   SpO2: 99%   Weight: 104.3 kg (230 lb)       MDM      Reassessment  ***    CONSULTS:  None    PROCEDURES:  Unless otherwise noted below, none     Procedures    FINAL IMPRESSION    No diagnosis found.      DISPOSITION/PLAN   DISPOSITION        PATIENT REFERRED TO:  No follow-up provider specified.    DISCHARGE MEDICATIONS:  New Prescriptions    No medications on file          (Please note that portions of this note were completed with a voice recognition program.  Efforts were made to edit thedictations but occasionally words

## 2024-04-17 NOTE — PROGRESS NOTES
[facility]  [facility] INTERNAL MEDICINE  [address] [place] [address] DRIVE  [address]  [place] [address]  Dept: [phone]  Dept Fax: [phone]  Loc: [phone]    Yuli Duran (:  1945) is a 78 y.o. female,Established patient  with green, here for evaluation of the following chief complaint(s): Follow-up (Bilateral cellulitis )      Yuli Duran is a 78 y.o. female who presents today for her medical conditions/complaints as noted below.  Yuli Duran is c/byron Follow-up (Bilateral cellulitis )        HPI:     Chief Complaint   Patient presents with    Follow-up     Bilateral cellulitis      @pt is alone     HPI    ER fu  she went there yesterday for redness and swelling of bilateral lower extremities; she was sent home with Keflex she was CBC with a normal white count; chemistry is okay other than slightly elevated blood sugar and potassium 3.9.          2.  Chronic cellulitis;  worsened about 2 weeks ago; WBC is in the ER last night was normal;    3.  Weight gain;  she ihas been off the bumex'        Past Medical History:   Diagnosis Date    Anxiety     Bruising     Diabetes mellitus (HCC)     Edema     Fracture of nasal bone     History of cellulitis     History of constipation     medication induced    Hypertension     Non-alcoholic cirrhosis (HCC)     SDH (subdural hematoma) (HCC) 10/30/2021    small, post fall ([facility]    Sleep disorder     Splenomegaly     Thyroid disease       Past Surgical History:   Procedure Laterality Date    CHOLECYSTECTOMY      COLONOSCOPY      normal, per pt    COLONOSCOPY N/A 2023    Dr FAVIO Sandoval-[facility] x 1, 5 yr recall    HYSTERECTOMY (CERVIX STATUS UNKNOWN)      [facility] STEROTACT[facility] LOC BREAST BIOPSY RIGHT Right 2022    [facility] STEROTACTIC LOC BREAST BIOPSY RIGHT 2022 [facility]    OVARY REMOVAL Bilateral     AGE 55           2024     1:43 PM 2024    12:14 AM 2024     9:19 PM 4/15/2024

## 2024-04-17 NOTE — ASSESSMENT & PLAN NOTE
Restart the compression machine and take Bumex 3 days in a row with added potassium and then start taking it 3 times a week every time having a potassium pill

## 2024-04-17 NOTE — PATIENT INSTRUCTIONS
Swelling of feet and legs;  you can take bumex 0.5 daily for the next 3 days  then 3 times a week; ;  then add K+ every time you take a bumex   Chronic cellulitis continue with the Keflex  2.  Sunburn just keep good lotion on your legs and feet  PATIENT INSTRUCTIONS:      IT IS VERY IMPORTANT THAT YOU GET YOUR LABS AT LEAST 1 DAY BEFORE YOUR APPT.  WE MAY HAVE TO RESCHEDULE YOU IF YOU WE DONT HAVE THE RESULTS;      WE CAN DISCUSS THE LABS ON THE DAY OF YOUR OFFICE VISIT AND MAKE CHANGES TOGETHER WHILE YOU ARE HERE.  THEN YOU CAN LEAVE WITH A COPY OF THOSE CHANGES.     CALLING YOU BACK WITH ROUTINE RESULTS IS VERY TIME CONSUMING;  I HAVE TO REVIEW THE LABS, THEN YOUR PAST LABS AND OV, THEN MAKE A NOTE TO THE NURSES TO CALL YOU, OR I CALL MYSELF WITH EXTREME ABNORMALITIES WHICH CAN BE TIME CONSUMING AS WELL;  WHEN I HAVE TO TAKE TIME OUT OF ANOTHER DAY FOR YOUR RESULTS THAT IS TIME AWAY FROM OTHER PATIENTS AND DISCUSSING RESULTS OVER THE PHONE IS JUST NOT A GOOD IDEA;  WHILE HERE WE CAN WRITE DOWN THE CHANGES BEFORE YOU LEAVE THE OFFICE;      Life simple 7  1) Manage blood pressure - high blood pressure is a major risk factor for heart disease and stroke. Keeping blood pressure in health range reduces strain on your heart, arteries and kidneys.  Blood pressure goal is less than 130/80.   2) Control cholesterol - contributes to plaque, which can clog arteries and lead to heart disease and stroke. When you control your cholesterol you are giving your arteries their best chance to remain clear.  It is recommended that you get cholesterol lab work done once a year.  3) Reduce blood sugar - most of the food we eat is turning into glucose or blood sugar that our body uses for energy.  Over time, high levels of blood sugar can damage your heart, kidneys, eyes and nerves.  4) Get active - living an active life is one of the most rewarding gifts you can give yourself and those you love.  Simply put, daily physical activity

## 2024-04-17 NOTE — ASSESSMENT & PLAN NOTE
She has absolute demarcation from clothing on her legs;  ; although there is some superficial and lower extremity cellulitis but is not much more than she has chronically.  Will finish the Keflex and have her get back into her compression machine

## 2024-05-01 ASSESSMENT — ENCOUNTER SYMPTOMS
SHORTNESS OF BREATH: 0
DIARRHEA: 0
NAUSEA: 0
ABDOMINAL PAIN: 0
VOMITING: 0

## 2024-05-06 DIAGNOSIS — I10 PRIMARY HYPERTENSION: ICD-10-CM

## 2024-05-06 DIAGNOSIS — E11.9 CONTROLLED TYPE 2 DIABETES MELLITUS WITHOUT COMPLICATION, WITHOUT LONG-TERM CURRENT USE OF INSULIN (HCC): ICD-10-CM

## 2024-05-06 DIAGNOSIS — R60.0 LOCALIZED EDEMA: ICD-10-CM

## 2024-05-06 DIAGNOSIS — E55.9 VITAMIN D DEFICIENCY: ICD-10-CM

## 2024-05-06 LAB
25(OH)D3 SERPL-MCNC: 44.3 NG/ML
ALBUMIN SERPL-MCNC: 3.2 G/DL (ref 3.5–5.2)
ALP SERPL-CCNC: 123 U/L (ref 35–104)
ALT SERPL-CCNC: 20 U/L (ref 5–33)
ANION GAP SERPL CALCULATED.3IONS-SCNC: 14 MMOL/L (ref 7–19)
AST SERPL-CCNC: 39 U/L (ref 5–32)
BASOPHILS # BLD: 0 K/UL (ref 0–0.2)
BASOPHILS NFR BLD: 0.4 % (ref 0–1)
BILIRUB SERPL-MCNC: 1.3 MG/DL (ref 0.2–1.2)
BILIRUB UR QL STRIP: NEGATIVE
BUN SERPL-MCNC: 15 MG/DL (ref 8–23)
CALCIUM SERPL-MCNC: 8.9 MG/DL (ref 8.8–10.2)
CHLORIDE SERPL-SCNC: 105 MMOL/L (ref 98–111)
CHOLEST SERPL-MCNC: 156 MG/DL (ref 160–199)
CLARITY UR: CLEAR
CO2 SERPL-SCNC: 24 MMOL/L (ref 22–29)
COLOR UR: YELLOW
CREAT SERPL-MCNC: 0.8 MG/DL (ref 0.5–0.9)
EOSINOPHIL # BLD: 0.2 K/UL (ref 0–0.6)
EOSINOPHIL NFR BLD: 4.5 % (ref 0–5)
ERYTHROCYTE [DISTWIDTH] IN BLOOD BY AUTOMATED COUNT: 15.7 % (ref 11.5–14.5)
GLUCOSE SERPL-MCNC: 131 MG/DL (ref 74–109)
GLUCOSE UR STRIP.AUTO-MCNC: NEGATIVE MG/DL
HBA1C MFR BLD: 5.4 % (ref 4–6)
HCT VFR BLD AUTO: 37.8 % (ref 37–47)
HDLC SERPL-MCNC: 76 MG/DL (ref 65–121)
HGB BLD-MCNC: 12.3 G/DL (ref 12–16)
HGB UR STRIP.AUTO-MCNC: NEGATIVE MG/L
IMM GRANULOCYTES # BLD: 0 K/UL
KETONES UR STRIP.AUTO-MCNC: NEGATIVE MG/DL
LDLC SERPL CALC-MCNC: 65 MG/DL
LEUKOCYTE ESTERASE UR QL STRIP.AUTO: NEGATIVE
LYMPHOCYTES # BLD: 1.1 K/UL (ref 1.1–4.5)
LYMPHOCYTES NFR BLD: 23.6 % (ref 20–40)
MCH RBC QN AUTO: 31.9 PG (ref 27–31)
MCHC RBC AUTO-ENTMCNC: 32.5 G/DL (ref 33–37)
MCV RBC AUTO: 98.2 FL (ref 81–99)
MONOCYTES # BLD: 0.3 K/UL (ref 0–0.9)
MONOCYTES NFR BLD: 7.3 % (ref 0–10)
NEUTROPHILS # BLD: 3 K/UL (ref 1.5–7.5)
NEUTS SEG NFR BLD: 64 % (ref 50–65)
NITRITE UR QL STRIP.AUTO: NEGATIVE
PH UR STRIP.AUTO: 7.5 [PH] (ref 5–8)
PLATELET # BLD AUTO: 75 K/UL (ref 130–400)
PMV BLD AUTO: 10.7 FL (ref 9.4–12.3)
POTASSIUM SERPL-SCNC: 4.1 MMOL/L (ref 3.5–5)
PROT SERPL-MCNC: 6.8 G/DL (ref 6.6–8.7)
PROT UR STRIP.AUTO-MCNC: NEGATIVE MG/DL
RBC # BLD AUTO: 3.85 M/UL (ref 4.2–5.4)
SODIUM SERPL-SCNC: 143 MMOL/L (ref 136–145)
SP GR UR STRIP.AUTO: 1.02 (ref 1–1.03)
TRIGL SERPL-MCNC: 75 MG/DL (ref 0–149)
TSH SERPL DL<=0.005 MIU/L-ACNC: 0.26 UIU/ML (ref 0.27–4.2)
UROBILINOGEN UR STRIP.AUTO-MCNC: 2 E.U./DL
WBC # BLD AUTO: 4.7 K/UL (ref 4.8–10.8)

## 2024-05-13 ENCOUNTER — OFFICE VISIT (OUTPATIENT)
Dept: INTERNAL MEDICINE | Age: 79
End: 2024-05-13
Payer: MEDICARE

## 2024-05-13 VITALS
HEART RATE: 90 BPM | OXYGEN SATURATION: 94 % | WEIGHT: 231 LBS | BODY MASS INDEX: 38.49 KG/M2 | SYSTOLIC BLOOD PRESSURE: 130 MMHG | HEIGHT: 65 IN | DIASTOLIC BLOOD PRESSURE: 70 MMHG

## 2024-05-13 DIAGNOSIS — R05.1 ACUTE COUGH: ICD-10-CM

## 2024-05-13 DIAGNOSIS — E11.9 CONTROLLED TYPE 2 DIABETES MELLITUS WITHOUT COMPLICATION, WITHOUT LONG-TERM CURRENT USE OF INSULIN (HCC): ICD-10-CM

## 2024-05-13 DIAGNOSIS — L55.9 SUNBURN: Primary | ICD-10-CM

## 2024-05-13 DIAGNOSIS — R60.0 LOCALIZED EDEMA: ICD-10-CM

## 2024-05-13 PROCEDURE — 1036F TOBACCO NON-USER: CPT | Performed by: NURSE PRACTITIONER

## 2024-05-13 PROCEDURE — 1090F PRES/ABSN URINE INCON ASSESS: CPT | Performed by: NURSE PRACTITIONER

## 2024-05-13 PROCEDURE — 3075F SYST BP GE 130 - 139MM HG: CPT | Performed by: NURSE PRACTITIONER

## 2024-05-13 PROCEDURE — 1123F ACP DISCUSS/DSCN MKR DOCD: CPT | Performed by: NURSE PRACTITIONER

## 2024-05-13 PROCEDURE — G8427 DOCREV CUR MEDS BY ELIG CLIN: HCPCS | Performed by: NURSE PRACTITIONER

## 2024-05-13 PROCEDURE — 99213 OFFICE O/P EST LOW 20 MIN: CPT | Performed by: NURSE PRACTITIONER

## 2024-05-13 PROCEDURE — 3044F HG A1C LEVEL LT 7.0%: CPT | Performed by: NURSE PRACTITIONER

## 2024-05-13 PROCEDURE — 3078F DIAST BP <80 MM HG: CPT | Performed by: NURSE PRACTITIONER

## 2024-05-13 PROCEDURE — G8417 CALC BMI ABV UP PARAM F/U: HCPCS | Performed by: NURSE PRACTITIONER

## 2024-05-13 PROCEDURE — G8399 PT W/DXA RESULTS DOCUMENT: HCPCS | Performed by: NURSE PRACTITIONER

## 2024-05-13 RX ORDER — LEVOTHYROXINE SODIUM 0.2 MG/1
TABLET ORAL
Qty: 30 TABLET | Refills: 3 | Status: SHIPPED | OUTPATIENT
Start: 2024-05-13

## 2024-05-13 RX ORDER — HYDROCHLOROTHIAZIDE 12.5 MG/1
CAPSULE, GELATIN COATED ORAL
Qty: 30 CAPSULE | Refills: 3 | Status: SHIPPED | OUTPATIENT
Start: 2024-05-13

## 2024-05-13 ASSESSMENT — ENCOUNTER SYMPTOMS
TROUBLE SWALLOWING: 0
SORE THROAT: 0
ABDOMINAL PAIN: 0
COLOR CHANGE: 0
EYE ITCHING: 0
BLOOD IN STOOL: 0
ABDOMINAL DISTENTION: 0
VOMITING: 0
STRIDOR: 0
CHOKING: 0
NAUSEA: 0

## 2024-05-13 NOTE — TELEPHONE ENCOUNTER
Yuli called requesting a refill of the below medication which has been pended for you:     Requested Prescriptions     Pending Prescriptions Disp Refills    levothyroxine (SYNTHROID) 200 MCG tablet [Pharmacy Med Name: LEVOTHYROXINE 200 MCG TABLET] 30 tablet 3     Sig: TAKE 1 TABLET BY MOUTH EVERY DAY    hydroCHLOROthiazide 12.5 MG capsule [Pharmacy Med Name: HYDROCHLOROTHIAZIDE 12.5 MG CP] 30 capsule 3     Sig: TAKE 1 CAPSULE BY MOUTH EVERY DAY       Last Appointment Date: 4/17/2024  Next Appointment Date: 5/13/2024    No Known Allergies

## 2024-05-13 NOTE — PROGRESS NOTES
[facility]  [facility] INTERNAL MEDICINE  [address] [place] [address] DRIVE  [address]  [place] [address]  Dept: [phone]  Dept Fax: [phone]  Loc: [phone]    Yuli Duran (:  1945) is a 79 y.o. female,Established patient  with green, here for evaluation of the following chief complaint(s): Follow-up      Yuli Duran is a 79 y.o. female who presents today for her medical conditions/complaints as noted below.  Yuli Duran is c/byron Follow-up        HPI:     Chief Complaint   Patient presents with    Follow-up     @pt is alone    HPI    Cough for 3 weeks; but much better   Chronic edema;   she is using compression machine for the edema;   Redness to legs she agrees now was a sunburn;  it is resolved to the thighs   4.  Controlled TYpe II DM;  5.4 a1c no meds;     Past Medical History:   Diagnosis Date    Anxiety     Bruising     Diabetes mellitus (HCC)     Edema     Fracture of nasal bone     History of cellulitis     History of constipation     medication induced    Hypertension     Non-alcoholic cirrhosis (HCC)     SDH (subdural hematoma) (HCC) 10/30/2021    small, post fall ([facility]    Sleep disorder     Splenomegaly     Thyroid disease       Past Surgical History:   Procedure Laterality Date    CHOLECYSTECTOMY      COLONOSCOPY  2014    normal, per pt    COLONOSCOPY N/A 2023    Dr FAVIO Sandoval-AP x 1, 5 yr recall    HYSTERECTOMY (CERVIX STATUS UNKNOWN)      [facility] LOC BREAST BIOPSY RIGHT Right 2022    [facility] STEROTACTIC LOC BREAST BIOPSY RIGHT 2022 [facility]    OVARY REMOVAL Bilateral     AGE 55           2024     3:10 PM 2024     1:43 PM 2024    12:14 AM 2024     9:19 PM 4/15/2024    10:29 AM 2023     1:25 PM   Vitals   SYSTOLIC 130 140 155 200  137   DIASTOLIC 70 70 56 86  76   Pulse 90 75 87 98  92   Temp    97.8 °F (36.6 °C)     Respirations   20 18     SpO2 94 % 97 % 99 % 99 %  96 %

## 2024-05-13 NOTE — PATIENT INSTRUCTIONS
1.  Chronic edema continue with compression machine  2.  Acute cough resolved  3.  Sunburn resolved  4.  Controlled type 2 diabetes mellitus stable no changes  PATIENT INSTRUCTIONS:      IT IS VERY IMPORTANT THAT YOU GET YOUR LABS AT LEAST 1 DAY BEFORE YOUR APPT.  WE MAY HAVE TO RESCHEDULE YOU IF YOU WE DONT HAVE THE RESULTS;      WE CAN DISCUSS THE LABS ON THE DAY OF YOUR OFFICE VISIT AND MAKE CHANGES TOGETHER WHILE YOU ARE HERE.  THEN YOU CAN LEAVE WITH A COPY OF THOSE CHANGES.     CALLING YOU BACK WITH ROUTINE RESULTS IS VERY TIME CONSUMING;  I HAVE TO REVIEW THE LABS, THEN YOUR PAST LABS AND OV, THEN MAKE A NOTE TO THE NURSES TO CALL YOU, OR I CALL MYSELF WITH EXTREME ABNORMALITIES WHICH CAN BE TIME CONSUMING AS WELL;  WHEN I HAVE TO TAKE TIME OUT OF ANOTHER DAY FOR YOUR RESULTS THAT IS TIME AWAY FROM OTHER PATIENTS AND DISCUSSING RESULTS OVER THE PHONE IS JUST NOT A GOOD IDEA;  WHILE HERE WE CAN WRITE DOWN THE CHANGES BEFORE YOU LEAVE THE OFFICE;      Life simple 7  1) Manage blood pressure - high blood pressure is a major risk factor for heart disease and stroke. Keeping blood pressure in health range reduces strain on your heart, arteries and kidneys.  Blood pressure goal is less than 130/80.   2) Control cholesterol - contributes to plaque, which can clog arteries and lead to heart disease and stroke. When you control your cholesterol you are giving your arteries their best chance to remain clear.  It is recommended that you get cholesterol lab work done once a year.  3) Reduce blood sugar - most of the food we eat is turning into glucose or blood sugar that our body uses for energy.  Over time, high levels of blood sugar can damage your heart, kidneys, eyes and nerves.  4) Get active - living an active life is one of the most rewarding gifts you can give yourself and those you love.  Simply put, daily physical activity increases your length and quality of life. Strive to exercise 15 minutes most days of the

## 2024-05-13 NOTE — ASSESSMENT & PLAN NOTE
This is chronic for her we will just use as needed Bumex due to her renal function and she is using compression machine which is certainly helped her

## 2024-06-10 RX ORDER — ERGOCALCIFEROL 1.25 MG/1
50000 CAPSULE ORAL WEEKLY
Qty: 12 CAPSULE | Refills: 1 | Status: SHIPPED | OUTPATIENT
Start: 2024-06-10

## 2024-06-10 NOTE — TELEPHONE ENCOUNTER
Yuli called requesting a refill of the below medication which has been pended for you:     Requested Prescriptions     Pending Prescriptions Disp Refills    vitamin D (ERGOCALCIFEROL) 1.25 MG (28106 UT) CAPS capsule [Pharmacy Med Name: VITAMIN D2 1.25MG(50,000 UNIT)] 12 capsule 1     Sig: TAKE 1 CAPSULE BY MOUTH ONE TIME PER WEEK       Last Appointment Date: 5/13/2024  Next Appointment Date: 8/16/2024    No Known Allergies

## 2024-08-09 DIAGNOSIS — E11.9 CONTROLLED TYPE 2 DIABETES MELLITUS WITHOUT COMPLICATION, WITHOUT LONG-TERM CURRENT USE OF INSULIN (HCC): Primary | ICD-10-CM

## 2024-08-09 DIAGNOSIS — Z00.00 MEDICARE ANNUAL WELLNESS VISIT, SUBSEQUENT: ICD-10-CM

## 2024-08-09 DIAGNOSIS — E11.9 CONTROLLED TYPE 2 DIABETES MELLITUS WITHOUT COMPLICATION, WITHOUT LONG-TERM CURRENT USE OF INSULIN (HCC): ICD-10-CM

## 2024-08-09 LAB
ALBUMIN SERPL-MCNC: 3.2 G/DL (ref 3.5–5.2)
ALP SERPL-CCNC: 111 U/L (ref 35–104)
ALT SERPL-CCNC: 14 U/L (ref 5–33)
ANION GAP SERPL CALCULATED.3IONS-SCNC: 10 MMOL/L (ref 7–19)
AST SERPL-CCNC: 30 U/L (ref 5–32)
BILIRUB SERPL-MCNC: 1.8 MG/DL (ref 0.2–1.2)
BUN SERPL-MCNC: 14 MG/DL (ref 8–23)
CALCIUM SERPL-MCNC: 8.7 MG/DL (ref 8.8–10.2)
CHLORIDE SERPL-SCNC: 104 MMOL/L (ref 98–111)
CHOLEST SERPL-MCNC: 145 MG/DL (ref 160–199)
CO2 SERPL-SCNC: 27 MMOL/L (ref 22–29)
CREAT SERPL-MCNC: 0.7 MG/DL (ref 0.5–0.9)
ERYTHROCYTE [DISTWIDTH] IN BLOOD BY AUTOMATED COUNT: 15.9 % (ref 11.5–14.5)
GLUCOSE SERPL-MCNC: 113 MG/DL (ref 74–109)
HCT VFR BLD AUTO: 35.9 % (ref 37–47)
HDLC SERPL-MCNC: 72 MG/DL (ref 65–121)
HGB BLD-MCNC: 11.9 G/DL (ref 12–16)
LDLC SERPL CALC-MCNC: 61 MG/DL
MCH RBC QN AUTO: 32.3 PG (ref 27–31)
MCHC RBC AUTO-ENTMCNC: 33.1 G/DL (ref 33–37)
MCV RBC AUTO: 97.6 FL (ref 81–99)
PLATELET # BLD AUTO: 74 K/UL (ref 130–400)
PMV BLD AUTO: 10.4 FL (ref 9.4–12.3)
POTASSIUM SERPL-SCNC: 4.4 MMOL/L (ref 3.5–5)
PROT SERPL-MCNC: 6.3 G/DL (ref 6.6–8.7)
RBC # BLD AUTO: 3.68 M/UL (ref 4.2–5.4)
SODIUM SERPL-SCNC: 141 MMOL/L (ref 136–145)
T4 FREE SERPL-MCNC: 1.51 NG/DL (ref 0.93–1.7)
TRIGL SERPL-MCNC: 59 MG/DL (ref 0–149)
TSH SERPL DL<=0.005 MIU/L-ACNC: 0.1 UIU/ML (ref 0.27–4.2)
WBC # BLD AUTO: 4.6 K/UL (ref 4.8–10.8)

## 2024-08-16 ENCOUNTER — OFFICE VISIT (OUTPATIENT)
Dept: INTERNAL MEDICINE | Age: 79
End: 2024-08-16

## 2024-08-16 VITALS
HEART RATE: 85 BPM | DIASTOLIC BLOOD PRESSURE: 80 MMHG | HEIGHT: 65 IN | SYSTOLIC BLOOD PRESSURE: 130 MMHG | WEIGHT: 248 LBS | OXYGEN SATURATION: 98 % | BODY MASS INDEX: 41.32 KG/M2

## 2024-08-16 DIAGNOSIS — I10 PRIMARY HYPERTENSION: ICD-10-CM

## 2024-08-16 DIAGNOSIS — K75.81 NASH (NONALCOHOLIC STEATOHEPATITIS): Primary | ICD-10-CM

## 2024-08-16 DIAGNOSIS — E03.8 OTHER SPECIFIED HYPOTHYROIDISM: ICD-10-CM

## 2024-08-16 DIAGNOSIS — E11.9 CONTROLLED TYPE 2 DIABETES MELLITUS WITHOUT COMPLICATION, WITHOUT LONG-TERM CURRENT USE OF INSULIN (HCC): ICD-10-CM

## 2024-08-16 DIAGNOSIS — L03.90 CHRONIC CELLULITIS: ICD-10-CM

## 2024-08-16 RX ORDER — DOXYCYCLINE HYCLATE 100 MG
100 TABLET ORAL 2 TIMES DAILY
Qty: 20 TABLET | Refills: 0 | Status: SHIPPED | OUTPATIENT
Start: 2024-08-16 | End: 2024-08-26

## 2024-08-16 ASSESSMENT — ENCOUNTER SYMPTOMS
BLOOD IN STOOL: 0
SINUS PRESSURE: 0
SHORTNESS OF BREATH: 0
ABDOMINAL DISTENTION: 0
EYE DISCHARGE: 0
VOMITING: 0
SORE THROAT: 0
WHEEZING: 0
NAUSEA: 0
TROUBLE SWALLOWING: 0
EYE ITCHING: 0
ABDOMINAL PAIN: 0
COLOR CHANGE: 1
BACK PAIN: 0

## 2024-08-16 NOTE — PROGRESS NOTES
diet and exercise.Patient agreed with treatment plan.     **This report has been created usingvoice recognition software. It may contain minor errors which are inherent in voicerecognition technology.**    Electronically signed by Dereje John MD on 8/16/2024 at 3:42 PM

## 2024-09-16 ENCOUNTER — OFFICE VISIT (OUTPATIENT)
Dept: INTERNAL MEDICINE | Age: 79
End: 2024-09-16
Payer: MEDICARE

## 2024-09-16 ENCOUNTER — HOSPITAL ENCOUNTER (OUTPATIENT)
Dept: INFUSION THERAPY | Age: 79
Setting detail: INFUSION SERIES
Discharge: HOME | End: 2024-09-16
Payer: MEDICARE

## 2024-09-16 VITALS
RESPIRATION RATE: 18 BRPM | TEMPERATURE: 97.4 F | HEART RATE: 75 BPM | SYSTOLIC BLOOD PRESSURE: 145 MMHG | OXYGEN SATURATION: 99 % | DIASTOLIC BLOOD PRESSURE: 54 MMHG

## 2024-09-16 VITALS
SYSTOLIC BLOOD PRESSURE: 150 MMHG | WEIGHT: 240 LBS | OXYGEN SATURATION: 97 % | HEART RATE: 81 BPM | DIASTOLIC BLOOD PRESSURE: 60 MMHG | BODY MASS INDEX: 39.94 KG/M2

## 2024-09-16 DIAGNOSIS — L03.115 BILATERAL CELLULITIS OF LOWER LEG: Primary | ICD-10-CM

## 2024-09-16 DIAGNOSIS — L03.115 BILATERAL CELLULITIS OF LOWER LEG: ICD-10-CM

## 2024-09-16 DIAGNOSIS — L03.116 BILATERAL CELLULITIS OF LOWER LEG: Primary | ICD-10-CM

## 2024-09-16 DIAGNOSIS — L03.90 CHRONIC CELLULITIS: Primary | ICD-10-CM

## 2024-09-16 DIAGNOSIS — L03.116 BILATERAL CELLULITIS OF LOWER LEG: ICD-10-CM

## 2024-09-16 DIAGNOSIS — L03.90 CHRONIC CELLULITIS: ICD-10-CM

## 2024-09-16 PROCEDURE — 1123F ACP DISCUSS/DSCN MKR DOCD: CPT | Performed by: INTERNAL MEDICINE

## 2024-09-16 PROCEDURE — G8417 CALC BMI ABV UP PARAM F/U: HCPCS | Performed by: INTERNAL MEDICINE

## 2024-09-16 PROCEDURE — 96374 THER/PROPH/DIAG INJ IV PUSH: CPT

## 2024-09-16 PROCEDURE — 3078F DIAST BP <80 MM HG: CPT | Performed by: INTERNAL MEDICINE

## 2024-09-16 PROCEDURE — G8427 DOCREV CUR MEDS BY ELIG CLIN: HCPCS | Performed by: INTERNAL MEDICINE

## 2024-09-16 PROCEDURE — G8399 PT W/DXA RESULTS DOCUMENT: HCPCS | Performed by: INTERNAL MEDICINE

## 2024-09-16 PROCEDURE — 6360000002 HC RX W HCPCS: Performed by: INTERNAL MEDICINE

## 2024-09-16 PROCEDURE — 3077F SYST BP >= 140 MM HG: CPT | Performed by: INTERNAL MEDICINE

## 2024-09-16 PROCEDURE — 99213 OFFICE O/P EST LOW 20 MIN: CPT | Performed by: INTERNAL MEDICINE

## 2024-09-16 PROCEDURE — 2580000003 HC RX 258: Performed by: INTERNAL MEDICINE

## 2024-09-16 PROCEDURE — 1090F PRES/ABSN URINE INCON ASSESS: CPT | Performed by: INTERNAL MEDICINE

## 2024-09-16 PROCEDURE — 1036F TOBACCO NON-USER: CPT | Performed by: INTERNAL MEDICINE

## 2024-09-16 RX ORDER — SODIUM CHLORIDE 9 MG/ML
INJECTION, SOLUTION INTRAVENOUS CONTINUOUS
Status: CANCELLED | OUTPATIENT
Start: 2024-09-16

## 2024-09-16 RX ORDER — DIPHENHYDRAMINE HYDROCHLORIDE 50 MG/ML
50 INJECTION INTRAMUSCULAR; INTRAVENOUS
Status: CANCELLED | OUTPATIENT
Start: 2024-09-17

## 2024-09-16 RX ORDER — SODIUM CHLORIDE 0.9 % (FLUSH) 0.9 %
5-40 SYRINGE (ML) INJECTION PRN
Status: CANCELLED | OUTPATIENT
Start: 2024-09-16

## 2024-09-16 RX ORDER — EPINEPHRINE 1 MG/ML
0.3 INJECTION, SOLUTION, CONCENTRATE INTRAVENOUS PRN
Status: CANCELLED | OUTPATIENT
Start: 2024-09-16

## 2024-09-16 RX ORDER — SODIUM CHLORIDE 0.9 % (FLUSH) 0.9 %
5-40 SYRINGE (ML) INJECTION PRN
Status: DISCONTINUED | OUTPATIENT
Start: 2024-09-16 | End: 2024-09-17 | Stop reason: HOSPADM

## 2024-09-16 RX ORDER — ONDANSETRON 2 MG/ML
8 INJECTION INTRAMUSCULAR; INTRAVENOUS
Status: CANCELLED | OUTPATIENT
Start: 2024-09-16

## 2024-09-16 RX ORDER — ACETAMINOPHEN 325 MG/1
650 TABLET ORAL
Status: CANCELLED | OUTPATIENT
Start: 2024-09-17

## 2024-09-16 RX ORDER — ACETAMINOPHEN 325 MG/1
650 TABLET ORAL
Status: CANCELLED | OUTPATIENT
Start: 2024-09-16

## 2024-09-16 RX ORDER — HEPARIN 100 UNIT/ML
500 SYRINGE INTRAVENOUS PRN
Status: CANCELLED | OUTPATIENT
Start: 2024-09-17

## 2024-09-16 RX ORDER — ALBUTEROL SULFATE 90 UG/1
4 INHALANT RESPIRATORY (INHALATION) PRN
Status: CANCELLED | OUTPATIENT
Start: 2024-09-16

## 2024-09-16 RX ORDER — ALBUTEROL SULFATE 90 UG/1
4 INHALANT RESPIRATORY (INHALATION) PRN
Status: CANCELLED | OUTPATIENT
Start: 2024-09-17

## 2024-09-16 RX ORDER — HEPARIN 100 UNIT/ML
500 SYRINGE INTRAVENOUS PRN
Status: CANCELLED | OUTPATIENT
Start: 2024-09-16

## 2024-09-16 RX ORDER — EPINEPHRINE 1 MG/ML
0.3 INJECTION, SOLUTION, CONCENTRATE INTRAVENOUS PRN
Status: CANCELLED | OUTPATIENT
Start: 2024-09-17

## 2024-09-16 RX ORDER — DIPHENHYDRAMINE HYDROCHLORIDE 50 MG/ML
50 INJECTION INTRAMUSCULAR; INTRAVENOUS
Status: CANCELLED | OUTPATIENT
Start: 2024-09-16

## 2024-09-16 RX ORDER — ONDANSETRON 2 MG/ML
8 INJECTION INTRAMUSCULAR; INTRAVENOUS
Status: CANCELLED | OUTPATIENT
Start: 2024-09-17

## 2024-09-16 RX ORDER — SODIUM CHLORIDE 0.9 % (FLUSH) 0.9 %
5-40 SYRINGE (ML) INJECTION PRN
Status: CANCELLED | OUTPATIENT
Start: 2024-09-17

## 2024-09-16 RX ORDER — SODIUM CHLORIDE 9 MG/ML
INJECTION, SOLUTION INTRAVENOUS CONTINUOUS
Status: CANCELLED | OUTPATIENT
Start: 2024-09-17

## 2024-09-16 RX ADMIN — WATER 1000 MG: 1 INJECTION INTRAMUSCULAR; INTRAVENOUS; SUBCUTANEOUS at 15:09

## 2024-09-17 ENCOUNTER — HOSPITAL ENCOUNTER (OUTPATIENT)
Dept: INFUSION THERAPY | Age: 79
Setting detail: INFUSION SERIES
Discharge: HOME | End: 2024-09-17
Payer: MEDICARE

## 2024-09-17 VITALS
DIASTOLIC BLOOD PRESSURE: 54 MMHG | SYSTOLIC BLOOD PRESSURE: 152 MMHG | OXYGEN SATURATION: 100 % | RESPIRATION RATE: 18 BRPM | TEMPERATURE: 97.5 F | HEART RATE: 65 BPM

## 2024-09-17 DIAGNOSIS — L03.90 CHRONIC CELLULITIS: ICD-10-CM

## 2024-09-17 DIAGNOSIS — L03.115 BILATERAL CELLULITIS OF LOWER LEG: Primary | ICD-10-CM

## 2024-09-17 DIAGNOSIS — L03.116 BILATERAL CELLULITIS OF LOWER LEG: Primary | ICD-10-CM

## 2024-09-17 PROCEDURE — 2580000003 HC RX 258: Performed by: INTERNAL MEDICINE

## 2024-09-17 PROCEDURE — 96374 THER/PROPH/DIAG INJ IV PUSH: CPT

## 2024-09-17 PROCEDURE — 6360000002 HC RX W HCPCS: Performed by: INTERNAL MEDICINE

## 2024-09-17 RX ORDER — ONDANSETRON 2 MG/ML
8 INJECTION INTRAMUSCULAR; INTRAVENOUS
Status: CANCELLED | OUTPATIENT
Start: 2024-09-18

## 2024-09-17 RX ORDER — EPINEPHRINE 1 MG/ML
0.3 INJECTION, SOLUTION, CONCENTRATE INTRAVENOUS PRN
Status: CANCELLED | OUTPATIENT
Start: 2024-09-18

## 2024-09-17 RX ORDER — DIPHENHYDRAMINE HYDROCHLORIDE 50 MG/ML
50 INJECTION INTRAMUSCULAR; INTRAVENOUS
Status: CANCELLED | OUTPATIENT
Start: 2024-09-18

## 2024-09-17 RX ORDER — ALBUTEROL SULFATE 90 UG/1
4 INHALANT RESPIRATORY (INHALATION) PRN
Status: CANCELLED | OUTPATIENT
Start: 2024-09-18

## 2024-09-17 RX ORDER — HEPARIN 100 UNIT/ML
500 SYRINGE INTRAVENOUS PRN
Status: CANCELLED | OUTPATIENT
Start: 2024-09-18

## 2024-09-17 RX ORDER — SODIUM CHLORIDE 9 MG/ML
INJECTION, SOLUTION INTRAVENOUS CONTINUOUS
Status: CANCELLED | OUTPATIENT
Start: 2024-09-18

## 2024-09-17 RX ORDER — ACETAMINOPHEN 325 MG/1
650 TABLET ORAL
Status: CANCELLED | OUTPATIENT
Start: 2024-09-18

## 2024-09-17 RX ORDER — SODIUM CHLORIDE 0.9 % (FLUSH) 0.9 %
5-40 SYRINGE (ML) INJECTION PRN
Status: CANCELLED | OUTPATIENT
Start: 2024-09-18

## 2024-09-17 RX ADMIN — WATER 1000 MG: 1 INJECTION INTRAMUSCULAR; INTRAVENOUS; SUBCUTANEOUS at 14:24

## 2024-09-18 ENCOUNTER — HOSPITAL ENCOUNTER (OUTPATIENT)
Dept: INFUSION THERAPY | Age: 79
Setting detail: INFUSION SERIES
Discharge: HOME | End: 2024-09-18
Payer: MEDICARE

## 2024-09-18 VITALS
HEART RATE: 81 BPM | DIASTOLIC BLOOD PRESSURE: 60 MMHG | SYSTOLIC BLOOD PRESSURE: 153 MMHG | TEMPERATURE: 97.8 F | OXYGEN SATURATION: 96 % | RESPIRATION RATE: 18 BRPM

## 2024-09-18 DIAGNOSIS — L03.116 BILATERAL CELLULITIS OF LOWER LEG: Primary | ICD-10-CM

## 2024-09-18 DIAGNOSIS — L03.115 BILATERAL CELLULITIS OF LOWER LEG: Primary | ICD-10-CM

## 2024-09-18 DIAGNOSIS — L03.90 CHRONIC CELLULITIS: ICD-10-CM

## 2024-09-18 PROCEDURE — 2580000003 HC RX 258: Performed by: INTERNAL MEDICINE

## 2024-09-18 PROCEDURE — 6360000002 HC RX W HCPCS: Performed by: INTERNAL MEDICINE

## 2024-09-18 PROCEDURE — 96374 THER/PROPH/DIAG INJ IV PUSH: CPT

## 2024-09-18 RX ORDER — ACETAMINOPHEN 325 MG/1
650 TABLET ORAL
Status: CANCELLED | OUTPATIENT
Start: 2024-09-19

## 2024-09-18 RX ORDER — SODIUM CHLORIDE 9 MG/ML
INJECTION, SOLUTION INTRAVENOUS CONTINUOUS
Status: CANCELLED | OUTPATIENT
Start: 2024-09-19

## 2024-09-18 RX ORDER — EPINEPHRINE 1 MG/ML
0.3 INJECTION, SOLUTION, CONCENTRATE INTRAVENOUS PRN
Status: CANCELLED | OUTPATIENT
Start: 2024-09-19

## 2024-09-18 RX ORDER — DIPHENHYDRAMINE HYDROCHLORIDE 50 MG/ML
50 INJECTION INTRAMUSCULAR; INTRAVENOUS
Status: CANCELLED | OUTPATIENT
Start: 2024-09-19

## 2024-09-18 RX ORDER — ONDANSETRON 2 MG/ML
8 INJECTION INTRAMUSCULAR; INTRAVENOUS
Status: CANCELLED | OUTPATIENT
Start: 2024-09-19

## 2024-09-18 RX ORDER — SODIUM CHLORIDE 0.9 % (FLUSH) 0.9 %
5-40 SYRINGE (ML) INJECTION PRN
Status: DISCONTINUED | OUTPATIENT
Start: 2024-09-18 | End: 2024-09-19 | Stop reason: HOSPADM

## 2024-09-18 RX ORDER — HEPARIN 100 UNIT/ML
500 SYRINGE INTRAVENOUS PRN
Status: CANCELLED | OUTPATIENT
Start: 2024-09-19

## 2024-09-18 RX ORDER — SODIUM CHLORIDE 0.9 % (FLUSH) 0.9 %
5-40 SYRINGE (ML) INJECTION PRN
Status: CANCELLED | OUTPATIENT
Start: 2024-09-19

## 2024-09-18 RX ORDER — ALBUTEROL SULFATE 90 UG/1
4 INHALANT RESPIRATORY (INHALATION) PRN
Status: CANCELLED | OUTPATIENT
Start: 2024-09-19

## 2024-09-18 RX ADMIN — WATER 1000 MG: 1 INJECTION INTRAMUSCULAR; INTRAVENOUS; SUBCUTANEOUS at 14:43

## 2024-09-19 ENCOUNTER — HOSPITAL ENCOUNTER (OUTPATIENT)
Dept: INFUSION THERAPY | Age: 79
Setting detail: INFUSION SERIES
Discharge: HOME | End: 2024-09-19
Payer: MEDICARE

## 2024-09-19 VITALS
OXYGEN SATURATION: 96 % | SYSTOLIC BLOOD PRESSURE: 159 MMHG | HEART RATE: 86 BPM | TEMPERATURE: 97.2 F | RESPIRATION RATE: 20 BRPM | DIASTOLIC BLOOD PRESSURE: 72 MMHG

## 2024-09-19 DIAGNOSIS — L03.116 BILATERAL CELLULITIS OF LOWER LEG: Primary | ICD-10-CM

## 2024-09-19 DIAGNOSIS — L03.115 BILATERAL CELLULITIS OF LOWER LEG: Primary | ICD-10-CM

## 2024-09-19 DIAGNOSIS — L03.90 CHRONIC CELLULITIS: ICD-10-CM

## 2024-09-19 PROCEDURE — 6360000002 HC RX W HCPCS: Performed by: INTERNAL MEDICINE

## 2024-09-19 PROCEDURE — 96374 THER/PROPH/DIAG INJ IV PUSH: CPT

## 2024-09-19 PROCEDURE — 2580000003 HC RX 258: Performed by: INTERNAL MEDICINE

## 2024-09-19 RX ORDER — ACETAMINOPHEN 325 MG/1
650 TABLET ORAL
Status: CANCELLED | OUTPATIENT
Start: 2024-09-20

## 2024-09-19 RX ORDER — ONDANSETRON 2 MG/ML
8 INJECTION INTRAMUSCULAR; INTRAVENOUS
Status: CANCELLED | OUTPATIENT
Start: 2024-09-20

## 2024-09-19 RX ORDER — ALBUTEROL SULFATE 90 UG/1
4 INHALANT RESPIRATORY (INHALATION) PRN
Status: CANCELLED | OUTPATIENT
Start: 2024-09-20

## 2024-09-19 RX ORDER — HEPARIN 100 UNIT/ML
500 SYRINGE INTRAVENOUS PRN
Status: CANCELLED | OUTPATIENT
Start: 2024-09-20

## 2024-09-19 RX ORDER — SODIUM CHLORIDE 0.9 % (FLUSH) 0.9 %
5-40 SYRINGE (ML) INJECTION PRN
Status: CANCELLED | OUTPATIENT
Start: 2024-09-20

## 2024-09-19 RX ORDER — EPINEPHRINE 1 MG/ML
0.3 INJECTION, SOLUTION, CONCENTRATE INTRAVENOUS PRN
Status: CANCELLED | OUTPATIENT
Start: 2024-09-20

## 2024-09-19 RX ORDER — DIPHENHYDRAMINE HYDROCHLORIDE 50 MG/ML
50 INJECTION INTRAMUSCULAR; INTRAVENOUS
Status: CANCELLED | OUTPATIENT
Start: 2024-09-20

## 2024-09-19 RX ORDER — SODIUM CHLORIDE 9 MG/ML
INJECTION, SOLUTION INTRAVENOUS CONTINUOUS
Status: CANCELLED | OUTPATIENT
Start: 2024-09-20

## 2024-09-19 RX ORDER — SODIUM CHLORIDE 0.9 % (FLUSH) 0.9 %
5-40 SYRINGE (ML) INJECTION PRN
Status: DISCONTINUED | OUTPATIENT
Start: 2024-09-19 | End: 2024-09-20 | Stop reason: HOSPADM

## 2024-09-19 RX ADMIN — SODIUM CHLORIDE, PRESERVATIVE FREE 10 ML: 5 INJECTION INTRAVENOUS at 14:42

## 2024-09-19 RX ADMIN — WATER 1000 MG: 1 INJECTION INTRAMUSCULAR; INTRAVENOUS; SUBCUTANEOUS at 14:31

## 2024-09-19 RX ADMIN — SODIUM CHLORIDE, PRESERVATIVE FREE 10 ML: 5 INJECTION INTRAVENOUS at 14:20

## 2024-09-20 ENCOUNTER — HOSPITAL ENCOUNTER (OUTPATIENT)
Dept: INFUSION THERAPY | Age: 79
Setting detail: INFUSION SERIES
Discharge: HOME | End: 2024-09-20
Payer: MEDICARE

## 2024-09-20 VITALS
HEART RATE: 83 BPM | SYSTOLIC BLOOD PRESSURE: 160 MMHG | TEMPERATURE: 98.2 F | OXYGEN SATURATION: 96 % | RESPIRATION RATE: 20 BRPM | DIASTOLIC BLOOD PRESSURE: 55 MMHG

## 2024-09-20 DIAGNOSIS — L03.90 CHRONIC CELLULITIS: ICD-10-CM

## 2024-09-20 DIAGNOSIS — L03.116 BILATERAL CELLULITIS OF LOWER LEG: Primary | ICD-10-CM

## 2024-09-20 DIAGNOSIS — L03.115 BILATERAL CELLULITIS OF LOWER LEG: Primary | ICD-10-CM

## 2024-09-20 PROCEDURE — 6360000002 HC RX W HCPCS: Performed by: INTERNAL MEDICINE

## 2024-09-20 PROCEDURE — 2580000003 HC RX 258: Performed by: INTERNAL MEDICINE

## 2024-09-20 PROCEDURE — 96374 THER/PROPH/DIAG INJ IV PUSH: CPT

## 2024-09-20 RX ORDER — ACETAMINOPHEN 325 MG/1
650 TABLET ORAL
Status: CANCELLED | OUTPATIENT
Start: 2024-09-21

## 2024-09-20 RX ORDER — DIPHENHYDRAMINE HYDROCHLORIDE 50 MG/ML
50 INJECTION INTRAMUSCULAR; INTRAVENOUS
Status: CANCELLED | OUTPATIENT
Start: 2024-09-21

## 2024-09-20 RX ORDER — HEPARIN 100 UNIT/ML
500 SYRINGE INTRAVENOUS PRN
Status: CANCELLED | OUTPATIENT
Start: 2024-09-21

## 2024-09-20 RX ORDER — SODIUM CHLORIDE 0.9 % (FLUSH) 0.9 %
5-40 SYRINGE (ML) INJECTION PRN
Status: DISCONTINUED | OUTPATIENT
Start: 2024-09-20 | End: 2024-09-21 | Stop reason: HOSPADM

## 2024-09-20 RX ORDER — SODIUM CHLORIDE 0.9 % (FLUSH) 0.9 %
5-40 SYRINGE (ML) INJECTION PRN
Status: CANCELLED | OUTPATIENT
Start: 2024-09-21

## 2024-09-20 RX ORDER — ALBUTEROL SULFATE 90 UG/1
4 INHALANT RESPIRATORY (INHALATION) PRN
Status: CANCELLED | OUTPATIENT
Start: 2024-09-21

## 2024-09-20 RX ORDER — EPINEPHRINE 1 MG/ML
0.3 INJECTION, SOLUTION, CONCENTRATE INTRAVENOUS PRN
Status: CANCELLED | OUTPATIENT
Start: 2024-09-21

## 2024-09-20 RX ORDER — ONDANSETRON 2 MG/ML
8 INJECTION INTRAMUSCULAR; INTRAVENOUS
Status: CANCELLED | OUTPATIENT
Start: 2024-09-21

## 2024-09-20 RX ORDER — SODIUM CHLORIDE 9 MG/ML
INJECTION, SOLUTION INTRAVENOUS CONTINUOUS
Status: CANCELLED | OUTPATIENT
Start: 2024-09-21

## 2024-09-20 RX ADMIN — WATER 1000 MG: 1 INJECTION INTRAMUSCULAR; INTRAVENOUS; SUBCUTANEOUS at 14:30

## 2024-09-20 RX ADMIN — SODIUM CHLORIDE, PRESERVATIVE FREE 10 ML: 5 INJECTION INTRAVENOUS at 14:28

## 2024-09-20 RX ADMIN — SODIUM CHLORIDE, PRESERVATIVE FREE 10 ML: 5 INJECTION INTRAVENOUS at 14:35

## 2024-09-21 ENCOUNTER — HOSPITAL ENCOUNTER (OUTPATIENT)
Age: 79
Discharge: HOME | End: 2024-09-21
Attending: INTERNAL MEDICINE | Admitting: INTERNAL MEDICINE
Payer: MEDICARE

## 2024-09-21 VITALS
DIASTOLIC BLOOD PRESSURE: 68 MMHG | RESPIRATION RATE: 16 BRPM | SYSTOLIC BLOOD PRESSURE: 154 MMHG | OXYGEN SATURATION: 98 % | TEMPERATURE: 97.1 F | HEART RATE: 74 BPM

## 2024-09-21 DIAGNOSIS — L03.116 BILATERAL CELLULITIS OF LOWER LEG: Primary | ICD-10-CM

## 2024-09-21 DIAGNOSIS — L03.115 BILATERAL CELLULITIS OF LOWER LEG: Primary | ICD-10-CM

## 2024-09-21 DIAGNOSIS — L03.90 CHRONIC CELLULITIS: ICD-10-CM

## 2024-09-21 PROCEDURE — 6360000002 HC RX W HCPCS: Performed by: INTERNAL MEDICINE

## 2024-09-21 PROCEDURE — 96374 THER/PROPH/DIAG INJ IV PUSH: CPT

## 2024-09-21 PROCEDURE — 2580000003 HC RX 258: Performed by: INTERNAL MEDICINE

## 2024-09-21 RX ORDER — HEPARIN 100 UNIT/ML
500 SYRINGE INTRAVENOUS PRN
Status: CANCELLED | OUTPATIENT
Start: 2024-09-22

## 2024-09-21 RX ORDER — ACETAMINOPHEN 325 MG/1
650 TABLET ORAL
Status: CANCELLED | OUTPATIENT
Start: 2024-09-22

## 2024-09-21 RX ORDER — EPINEPHRINE 1 MG/ML
0.3 INJECTION, SOLUTION, CONCENTRATE INTRAVENOUS PRN
Status: CANCELLED | OUTPATIENT
Start: 2024-09-22

## 2024-09-21 RX ORDER — DIPHENHYDRAMINE HYDROCHLORIDE 50 MG/ML
50 INJECTION INTRAMUSCULAR; INTRAVENOUS
Status: CANCELLED | OUTPATIENT
Start: 2024-09-22

## 2024-09-21 RX ORDER — SODIUM CHLORIDE 9 MG/ML
INJECTION, SOLUTION INTRAVENOUS CONTINUOUS
Status: CANCELLED | OUTPATIENT
Start: 2024-09-22

## 2024-09-21 RX ORDER — SODIUM CHLORIDE 0.9 % (FLUSH) 0.9 %
5-40 SYRINGE (ML) INJECTION PRN
Status: CANCELLED | OUTPATIENT
Start: 2024-09-22

## 2024-09-21 RX ORDER — ALBUTEROL SULFATE 90 UG/1
4 INHALANT RESPIRATORY (INHALATION) PRN
Status: CANCELLED | OUTPATIENT
Start: 2024-09-22

## 2024-09-21 RX ORDER — ONDANSETRON 2 MG/ML
8 INJECTION INTRAMUSCULAR; INTRAVENOUS
Status: CANCELLED | OUTPATIENT
Start: 2024-09-22

## 2024-09-21 RX ADMIN — WATER 1000 MG: 1 INJECTION INTRAMUSCULAR; INTRAVENOUS; SUBCUTANEOUS at 14:47

## 2024-09-22 ENCOUNTER — HOSPITAL ENCOUNTER (OUTPATIENT)
Age: 79
Discharge: HOME | End: 2024-09-22
Attending: INTERNAL MEDICINE | Admitting: INTERNAL MEDICINE
Payer: MEDICARE

## 2024-09-22 DIAGNOSIS — L03.90 CHRONIC CELLULITIS: ICD-10-CM

## 2024-09-22 DIAGNOSIS — L03.116 BILATERAL CELLULITIS OF LOWER LEG: Primary | ICD-10-CM

## 2024-09-22 DIAGNOSIS — L03.115 BILATERAL CELLULITIS OF LOWER LEG: Primary | ICD-10-CM

## 2024-09-22 PROCEDURE — 2580000003 HC RX 258: Performed by: INTERNAL MEDICINE

## 2024-09-22 PROCEDURE — 96374 THER/PROPH/DIAG INJ IV PUSH: CPT

## 2024-09-22 PROCEDURE — 6360000002 HC RX W HCPCS: Performed by: INTERNAL MEDICINE

## 2024-09-22 RX ORDER — HEPARIN 100 UNIT/ML
500 SYRINGE INTRAVENOUS PRN
Status: CANCELLED | OUTPATIENT
Start: 2024-09-23

## 2024-09-22 RX ORDER — DIPHENHYDRAMINE HYDROCHLORIDE 50 MG/ML
50 INJECTION INTRAMUSCULAR; INTRAVENOUS
Status: CANCELLED | OUTPATIENT
Start: 2024-09-23

## 2024-09-22 RX ORDER — ALBUTEROL SULFATE 90 UG/1
4 INHALANT RESPIRATORY (INHALATION) PRN
Status: CANCELLED | OUTPATIENT
Start: 2024-09-23

## 2024-09-22 RX ORDER — ACETAMINOPHEN 325 MG/1
650 TABLET ORAL
Status: CANCELLED | OUTPATIENT
Start: 2024-09-23

## 2024-09-22 RX ORDER — ONDANSETRON 2 MG/ML
8 INJECTION INTRAMUSCULAR; INTRAVENOUS
Status: CANCELLED | OUTPATIENT
Start: 2024-09-23

## 2024-09-22 RX ORDER — SODIUM CHLORIDE 0.9 % (FLUSH) 0.9 %
5-40 SYRINGE (ML) INJECTION PRN
Status: CANCELLED | OUTPATIENT
Start: 2024-09-23

## 2024-09-22 RX ORDER — EPINEPHRINE 1 MG/ML
0.3 INJECTION, SOLUTION, CONCENTRATE INTRAVENOUS PRN
Status: CANCELLED | OUTPATIENT
Start: 2024-09-23

## 2024-09-22 RX ORDER — SODIUM CHLORIDE 9 MG/ML
INJECTION, SOLUTION INTRAVENOUS CONTINUOUS
Status: CANCELLED | OUTPATIENT
Start: 2024-09-23

## 2024-09-22 RX ADMIN — SODIUM CHLORIDE, PRESERVATIVE FREE 1000 MG: 5 INJECTION INTRAVENOUS at 15:44

## 2024-09-23 ENCOUNTER — HOSPITAL ENCOUNTER (OUTPATIENT)
Dept: INFUSION THERAPY | Age: 79
Setting detail: INFUSION SERIES
Discharge: HOME | End: 2024-09-23
Payer: MEDICARE

## 2024-09-23 VITALS
HEART RATE: 81 BPM | DIASTOLIC BLOOD PRESSURE: 55 MMHG | SYSTOLIC BLOOD PRESSURE: 159 MMHG | OXYGEN SATURATION: 96 % | RESPIRATION RATE: 18 BRPM | TEMPERATURE: 97.6 F

## 2024-09-23 DIAGNOSIS — L03.116 BILATERAL CELLULITIS OF LOWER LEG: Primary | ICD-10-CM

## 2024-09-23 DIAGNOSIS — L03.90 CHRONIC CELLULITIS: ICD-10-CM

## 2024-09-23 DIAGNOSIS — L03.115 BILATERAL CELLULITIS OF LOWER LEG: Primary | ICD-10-CM

## 2024-09-23 PROCEDURE — 6360000002 HC RX W HCPCS: Performed by: INTERNAL MEDICINE

## 2024-09-23 PROCEDURE — 96374 THER/PROPH/DIAG INJ IV PUSH: CPT

## 2024-09-23 PROCEDURE — 2580000003 HC RX 258: Performed by: INTERNAL MEDICINE

## 2024-09-23 RX ORDER — SODIUM CHLORIDE 9 MG/ML
INJECTION, SOLUTION INTRAVENOUS CONTINUOUS
Status: CANCELLED | OUTPATIENT
Start: 2024-09-24

## 2024-09-23 RX ORDER — EPINEPHRINE 1 MG/ML
0.3 INJECTION, SOLUTION, CONCENTRATE INTRAVENOUS PRN
Status: CANCELLED | OUTPATIENT
Start: 2024-09-24

## 2024-09-23 RX ORDER — HEPARIN 100 UNIT/ML
500 SYRINGE INTRAVENOUS PRN
Status: CANCELLED | OUTPATIENT
Start: 2024-09-24

## 2024-09-23 RX ORDER — DIPHENHYDRAMINE HYDROCHLORIDE 50 MG/ML
50 INJECTION INTRAMUSCULAR; INTRAVENOUS
Status: CANCELLED | OUTPATIENT
Start: 2024-09-24

## 2024-09-23 RX ORDER — ALBUTEROL SULFATE 90 UG/1
4 INHALANT RESPIRATORY (INHALATION) PRN
Status: CANCELLED | OUTPATIENT
Start: 2024-09-24

## 2024-09-23 RX ORDER — ACETAMINOPHEN 325 MG/1
650 TABLET ORAL
Status: CANCELLED | OUTPATIENT
Start: 2024-09-24

## 2024-09-23 RX ORDER — ONDANSETRON 2 MG/ML
8 INJECTION INTRAMUSCULAR; INTRAVENOUS
Status: CANCELLED | OUTPATIENT
Start: 2024-09-24

## 2024-09-23 RX ORDER — SODIUM CHLORIDE 0.9 % (FLUSH) 0.9 %
5-40 SYRINGE (ML) INJECTION PRN
Status: CANCELLED | OUTPATIENT
Start: 2024-09-24

## 2024-09-23 RX ORDER — SODIUM CHLORIDE 0.9 % (FLUSH) 0.9 %
5-40 SYRINGE (ML) INJECTION PRN
Status: DISCONTINUED | OUTPATIENT
Start: 2024-09-23 | End: 2024-09-24 | Stop reason: HOSPADM

## 2024-09-23 RX ADMIN — CEFTRIAXONE 1000 MG: 1 INJECTION, POWDER, FOR SOLUTION INTRAMUSCULAR; INTRAVENOUS at 14:35

## 2024-09-23 NOTE — PROGRESS NOTES
[facility] Ro[facility]. Tolerated well.     Electronically signed by Cathleen Mooer RN on 9/23/2024 at 5:11 PM

## 2024-09-24 ENCOUNTER — HOSPITAL ENCOUNTER (OUTPATIENT)
Dept: INFUSION THERAPY | Age: 79
Setting detail: INFUSION SERIES
Discharge: HOME | End: 2024-09-24
Payer: MEDICARE

## 2024-09-24 VITALS
TEMPERATURE: 98.6 F | SYSTOLIC BLOOD PRESSURE: 152 MMHG | OXYGEN SATURATION: 94 % | RESPIRATION RATE: 18 BRPM | DIASTOLIC BLOOD PRESSURE: 64 MMHG | HEART RATE: 78 BPM

## 2024-09-24 DIAGNOSIS — L03.90 CHRONIC CELLULITIS: ICD-10-CM

## 2024-09-24 DIAGNOSIS — L03.115 BILATERAL CELLULITIS OF LOWER LEG: Primary | ICD-10-CM

## 2024-09-24 DIAGNOSIS — L03.116 BILATERAL CELLULITIS OF LOWER LEG: Primary | ICD-10-CM

## 2024-09-24 PROCEDURE — 96374 THER/PROPH/DIAG INJ IV PUSH: CPT

## 2024-09-24 PROCEDURE — 6360000002 HC RX W HCPCS: Performed by: INTERNAL MEDICINE

## 2024-09-24 PROCEDURE — 2580000003 HC RX 258: Performed by: INTERNAL MEDICINE

## 2024-09-24 RX ORDER — SODIUM CHLORIDE 0.9 % (FLUSH) 0.9 %
5-40 SYRINGE (ML) INJECTION PRN
Status: CANCELLED | OUTPATIENT
Start: 2024-09-25

## 2024-09-24 RX ORDER — EPINEPHRINE 1 MG/ML
0.3 INJECTION, SOLUTION, CONCENTRATE INTRAVENOUS PRN
Status: CANCELLED | OUTPATIENT
Start: 2024-09-25

## 2024-09-24 RX ORDER — SODIUM CHLORIDE 9 MG/ML
INJECTION, SOLUTION INTRAVENOUS CONTINUOUS
Status: CANCELLED | OUTPATIENT
Start: 2024-09-25

## 2024-09-24 RX ORDER — ONDANSETRON 2 MG/ML
8 INJECTION INTRAMUSCULAR; INTRAVENOUS
Status: CANCELLED | OUTPATIENT
Start: 2024-09-25

## 2024-09-24 RX ORDER — SODIUM CHLORIDE 0.9 % (FLUSH) 0.9 %
5-40 SYRINGE (ML) INJECTION PRN
Status: DISCONTINUED | OUTPATIENT
Start: 2024-09-24 | End: 2024-09-25 | Stop reason: HOSPADM

## 2024-09-24 RX ORDER — DIPHENHYDRAMINE HYDROCHLORIDE 50 MG/ML
50 INJECTION INTRAMUSCULAR; INTRAVENOUS
Status: CANCELLED | OUTPATIENT
Start: 2024-09-25

## 2024-09-24 RX ORDER — ACETAMINOPHEN 325 MG/1
650 TABLET ORAL
Status: CANCELLED | OUTPATIENT
Start: 2024-09-25

## 2024-09-24 RX ORDER — ALBUTEROL SULFATE 90 UG/1
4 INHALANT RESPIRATORY (INHALATION) PRN
Status: CANCELLED | OUTPATIENT
Start: 2024-09-25

## 2024-09-24 RX ORDER — HEPARIN 100 UNIT/ML
500 SYRINGE INTRAVENOUS PRN
Status: CANCELLED | OUTPATIENT
Start: 2024-09-25

## 2024-09-24 RX ADMIN — WATER 1000 MG: 1 INJECTION INTRAMUSCULAR; INTRAVENOUS; SUBCUTANEOUS at 14:32

## 2024-09-24 NOTE — PROGRESS NOTES
[facility]. Tolerated well.    Electronically signed by Cathleen Moore RN on 9/24/2024 at 2:48 PM

## 2024-09-25 ENCOUNTER — HOSPITAL ENCOUNTER (OUTPATIENT)
Dept: INFUSION THERAPY | Age: 79
Setting detail: INFUSION SERIES
Discharge: HOME | End: 2024-09-25
Payer: MEDICARE

## 2024-09-25 VITALS
SYSTOLIC BLOOD PRESSURE: 141 MMHG | TEMPERATURE: 97.6 F | HEART RATE: 78 BPM | DIASTOLIC BLOOD PRESSURE: 57 MMHG | RESPIRATION RATE: 18 BRPM | OXYGEN SATURATION: 98 %

## 2024-09-25 DIAGNOSIS — L03.90 CHRONIC CELLULITIS: ICD-10-CM

## 2024-09-25 DIAGNOSIS — L03.115 BILATERAL CELLULITIS OF LOWER LEG: Primary | ICD-10-CM

## 2024-09-25 DIAGNOSIS — L03.116 BILATERAL CELLULITIS OF LOWER LEG: Primary | ICD-10-CM

## 2024-09-25 PROCEDURE — 2580000003 HC RX 258: Performed by: INTERNAL MEDICINE

## 2024-09-25 PROCEDURE — 6360000002 HC RX W HCPCS: Performed by: INTERNAL MEDICINE

## 2024-09-25 PROCEDURE — 96374 THER/PROPH/DIAG INJ IV PUSH: CPT

## 2024-09-25 RX ORDER — ACETAMINOPHEN 325 MG/1
650 TABLET ORAL
Status: CANCELLED | OUTPATIENT
Start: 2024-09-25

## 2024-09-25 RX ORDER — ONDANSETRON 2 MG/ML
8 INJECTION INTRAMUSCULAR; INTRAVENOUS
Status: CANCELLED | OUTPATIENT
Start: 2024-09-25

## 2024-09-25 RX ORDER — SODIUM CHLORIDE 0.9 % (FLUSH) 0.9 %
5-40 SYRINGE (ML) INJECTION PRN
Status: DISCONTINUED | OUTPATIENT
Start: 2024-09-25 | End: 2024-09-26 | Stop reason: HOSPADM

## 2024-09-25 RX ORDER — ALBUTEROL SULFATE 90 UG/1
4 INHALANT RESPIRATORY (INHALATION) PRN
Status: CANCELLED | OUTPATIENT
Start: 2024-09-25

## 2024-09-25 RX ORDER — HEPARIN 100 UNIT/ML
500 SYRINGE INTRAVENOUS PRN
Status: CANCELLED | OUTPATIENT
Start: 2024-09-25

## 2024-09-25 RX ORDER — SODIUM CHLORIDE 0.9 % (FLUSH) 0.9 %
5-40 SYRINGE (ML) INJECTION PRN
Status: CANCELLED | OUTPATIENT
Start: 2024-09-25

## 2024-09-25 RX ORDER — EPINEPHRINE 1 MG/ML
0.3 INJECTION, SOLUTION, CONCENTRATE INTRAVENOUS PRN
Status: CANCELLED | OUTPATIENT
Start: 2024-09-25

## 2024-09-25 RX ORDER — SODIUM CHLORIDE 9 MG/ML
INJECTION, SOLUTION INTRAVENOUS CONTINUOUS
Status: CANCELLED | OUTPATIENT
Start: 2024-09-25

## 2024-09-25 RX ORDER — DIPHENHYDRAMINE HYDROCHLORIDE 50 MG/ML
50 INJECTION INTRAMUSCULAR; INTRAVENOUS
Status: CANCELLED | OUTPATIENT
Start: 2024-09-25

## 2024-09-25 RX ADMIN — WATER 1000 MG: 1 INJECTION INTRAMUSCULAR; INTRAVENOUS; SUBCUTANEOUS at 14:28

## 2024-09-25 NOTE — FLOWSHEET NOTE
09/25/24 1444   AVS Reviewed   AVS & discharge instructions reviewed with patient and/or representative? Yes   Reviewed instructions with Patient   Level of Understanding Questions answered;Verbalized understanding     ROCEPHIN 1 GRAM GIVEN AS ORDERED AND PT TOLERATED WELL

## 2024-10-01 RX ORDER — LEVOTHYROXINE SODIUM 200 UG/1
200 TABLET ORAL DAILY
Qty: 30 TABLET | Refills: 3 | Status: SHIPPED | OUTPATIENT
Start: 2024-10-01

## 2024-10-01 NOTE — TELEPHONE ENCOUNTER
Yuli called requesting a refill of the below medication which has been pended for you:     Requested Prescriptions     Pending Prescriptions Disp Refills    levothyroxine (SYNTHROID) 200 MCG tablet 30 tablet 3     Sig: Take 1 tablet by mouth daily       Last Appointment Date: Visit date not found  Next Appointment Date: 12/16/2024    No Known Allergies

## 2024-10-04 RX ORDER — HYDROCHLOROTHIAZIDE 12.5 MG/1
CAPSULE ORAL
Qty: 30 CAPSULE | Refills: 3 | Status: SHIPPED | OUTPATIENT
Start: 2024-10-04

## 2024-10-04 NOTE — PROGRESS NOTES
Last OV 9/16/2024  Next OV 12/16/2024      Requested Prescriptions     Pending Prescriptions Disp Refills    hydroCHLOROthiazide 12.5 MG capsule 30 capsule 3     Sig: TAKE 1 CAPSULE BY MOUTH EVERY DAY

## 2024-10-09 ENCOUNTER — OFFICE VISIT (OUTPATIENT)
Dept: INTERNAL MEDICINE | Age: 79
End: 2024-10-09
Payer: MEDICARE

## 2024-10-09 VITALS
SYSTOLIC BLOOD PRESSURE: 150 MMHG | BODY MASS INDEX: 40.6 KG/M2 | WEIGHT: 244 LBS | TEMPERATURE: 98.8 F | HEART RATE: 97 BPM | OXYGEN SATURATION: 97 % | DIASTOLIC BLOOD PRESSURE: 70 MMHG

## 2024-10-09 DIAGNOSIS — L03.116 BILATERAL CELLULITIS OF LOWER LEG: Primary | ICD-10-CM

## 2024-10-09 DIAGNOSIS — L03.115 BILATERAL CELLULITIS OF LOWER LEG: Primary | ICD-10-CM

## 2024-10-09 PROCEDURE — 99213 OFFICE O/P EST LOW 20 MIN: CPT | Performed by: NURSE PRACTITIONER

## 2024-10-09 PROCEDURE — 1090F PRES/ABSN URINE INCON ASSESS: CPT | Performed by: NURSE PRACTITIONER

## 2024-10-09 PROCEDURE — G8427 DOCREV CUR MEDS BY ELIG CLIN: HCPCS | Performed by: NURSE PRACTITIONER

## 2024-10-09 PROCEDURE — G8484 FLU IMMUNIZE NO ADMIN: HCPCS | Performed by: NURSE PRACTITIONER

## 2024-10-09 PROCEDURE — 3077F SYST BP >= 140 MM HG: CPT | Performed by: NURSE PRACTITIONER

## 2024-10-09 PROCEDURE — 1036F TOBACCO NON-USER: CPT | Performed by: NURSE PRACTITIONER

## 2024-10-09 PROCEDURE — G8399 PT W/DXA RESULTS DOCUMENT: HCPCS | Performed by: NURSE PRACTITIONER

## 2024-10-09 PROCEDURE — 3078F DIAST BP <80 MM HG: CPT | Performed by: NURSE PRACTITIONER

## 2024-10-09 PROCEDURE — G8417 CALC BMI ABV UP PARAM F/U: HCPCS | Performed by: NURSE PRACTITIONER

## 2024-10-09 PROCEDURE — 1123F ACP DISCUSS/DSCN MKR DOCD: CPT | Performed by: NURSE PRACTITIONER

## 2024-10-09 RX ORDER — CEPHALEXIN 500 MG/1
500 CAPSULE ORAL 3 TIMES DAILY
Qty: 21 CAPSULE | Refills: 0 | Status: SHIPPED | OUTPATIENT
Start: 2024-10-09 | End: 2024-10-16

## 2024-10-09 NOTE — PROGRESS NOTES
[facility]  [facility] INTERNAL MEDICINE  [address] [place] [address] DRIVE  [address]  [place] [address]  Dept: [phone]  Dept Fax: [phone]  Loc: [phone]    Yuli Duran is a 79 y.o. female who presents today for her medical conditions/complaintsas noted below.  Yuli Duran is c/o of Cellulitis (See if it has improved)        HPI:     HPI  Yuli presents today for follow-up on her bilateral lower extremity cellulitis.  She was seen in office and initially treated with round of 10 days of doxycycline.  Her symptoms did not improve so then she was treated with 10 days of IV Rocephin.  She is here today for follow-up.  She does report that it is a little bit better but her legs are still quite swollen and red.  She does have some tenderness.  Past Medical History:   Diagnosis Date    Anxiety     Bruising     Diabetes mellitus (HCC)     Edema     Fracture of nasal bone     History of cellulitis     History of constipation     medication induced    Hypertension     Non-alcoholic cirrhosis (HCC)     SDH (subdural hematoma) 10/30/2021    small, post fall ([facility]    Sleep disorder     Splenomegaly     Thyroid disease      Past Surgical History:   Procedure Laterality Date    CHOLECYSTECTOMY  1978    COLONOSCOPY  2014    normal, per pt    COLONOSCOPY N/A 04/17/2023    Dr FAVIO Sandoval-AP x 1, 5 yr recall    HYSTERECTOMY (CERVIX STATUS UNKNOWN)  2000    [facility] LOC BREAST BIOPSY RIGHT Right 04/27/2022    [facility] ST[facility]OT[facility] LOC BREAST BIOPSY RIGHT 4/27/2022 [facility]    OVARY REMOVAL Bilateral 2000    AGE 55       Family History   Problem Relation Age of Onset    Heart Disease Mother     Breast Cancer Maternal Aunt 72    Breast Cancer Maternal Aunt 74    Stomach Cancer Maternal Uncle 65        Great Uncle    Colon Polyps Neg Hx     Colon Cancer Neg Hx        Social History     Tobacco Use    Smoking status: Never    Smokeless tobacco: Never   Substance Use

## 2024-10-15 ENCOUNTER — TELEPHONE (OUTPATIENT)
Dept: HEMATOLOGY | Age: 79
End: 2024-10-15

## 2024-10-15 NOTE — TELEPHONE ENCOUNTER
Called pt. to remind them of appointment on 10/17/2024 and had to leave a detailed voicemail with appointment date and time. Reminded patient to just come at appointment time, and to not come at the lab appointment time. Reminded patient that we will not check them in any more than 30 minutes before appointment time. We have now moved to the [facility] [place] that is located between our old office and the ER at the [facility]. Letting the Pt know that our front entrance faces the [employer] fields, and also leaving our address..

## 2024-10-16 NOTE — PROGRESS NOTES
Screen  Discontinued     Subjective   Review of Systems   Constitutional:  Positive for fatigue. Negative for fever and unexpected weight change.   HENT:  Negative for dental problem, hearing loss, mouth sores, nosebleeds, sore throat and trouble swallowing.    Eyes:  Negative for discharge and itching.   Respiratory:  Negative for cough, shortness of breath and wheezing.    Cardiovascular:  Negative for chest pain and palpitations.   Gastrointestinal:  Positive for constipation. Negative for abdominal pain, diarrhea, nausea and vomiting.   Endocrine: Positive for cold intolerance. Negative for heat intolerance.   Genitourinary:  Negative for dysuria, frequency, hematuria and urgency.   Musculoskeletal:  Positive for arthralgias. Negative for joint swelling and myalgias.   Skin:  Positive for color change (Bright red discoloration bilateral shin areas). Negative for pallor and rash.   Allergic/Immunologic: Negative for environmental allergies and immunocompromised state.   Neurological:  Negative for seizures, syncope and numbness.        Balance issues    Hematological:  Negative for adenopathy. Bruises/bleeds easily.        Low platelets   Psychiatric/Behavioral:  Negative for agitation, behavioral problems and confusion. The patient is nervous/anxious.         History of anxiety/depression     Objective   Physical Exam  Vitals reviewed.   Constitutional:       General: She is not in acute distress.     Appearance: She is well-developed. She is obese. She is not toxic-appearing or diaphoretic.   HENT:      Head: Normocephalic and atraumatic.      Right Ear: External ear normal.      Left Ear: External ear normal.      Mouth/Throat:      Mouth: Mucous membranes are moist.   Eyes:      General: No scleral icterus.        Right eye: No discharge.         Left eye: No discharge.      Conjunctiva/sclera: Conjunctivae normal.   Neck:      Trachea: No tracheal deviation.   Cardiovascular:      Rate and Rhythm: Normal

## 2024-10-17 ENCOUNTER — OFFICE VISIT (OUTPATIENT)
Dept: HEMATOLOGY | Age: 79
End: 2024-10-17
Payer: MEDICARE

## 2024-10-17 ENCOUNTER — HOSPITAL ENCOUNTER (OUTPATIENT)
Dept: INFUSION THERAPY | Age: 79
Discharge: HOME | End: 2024-10-17
Payer: MEDICARE

## 2024-10-17 ENCOUNTER — OFFICE VISIT (OUTPATIENT)
Dept: INTERNAL MEDICINE | Age: 79
End: 2024-10-17
Payer: MEDICARE

## 2024-10-17 VITALS
TEMPERATURE: 98.5 F | HEART RATE: 78 BPM | WEIGHT: 247.9 LBS | HEIGHT: 65 IN | OXYGEN SATURATION: 98 % | DIASTOLIC BLOOD PRESSURE: 82 MMHG | BODY MASS INDEX: 41.3 KG/M2 | SYSTOLIC BLOOD PRESSURE: 138 MMHG

## 2024-10-17 VITALS — HEART RATE: 78 BPM | OXYGEN SATURATION: 98 % | HEIGHT: 65 IN | BODY MASS INDEX: 40.65 KG/M2 | WEIGHT: 244 LBS

## 2024-10-17 DIAGNOSIS — D69.6 THROMBOCYTOPENIA (HCC): Primary | ICD-10-CM

## 2024-10-17 DIAGNOSIS — D69.6 THROMBOCYTOPENIA (HCC): ICD-10-CM

## 2024-10-17 DIAGNOSIS — K75.81 NASH (NONALCOHOLIC STEATOHEPATITIS): ICD-10-CM

## 2024-10-17 DIAGNOSIS — L03.116 BILATERAL CELLULITIS OF LOWER LEG: Primary | ICD-10-CM

## 2024-10-17 DIAGNOSIS — L03.115 BILATERAL CELLULITIS OF LOWER LEG: Primary | ICD-10-CM

## 2024-10-17 DIAGNOSIS — R16.1 SPLENOMEGALY: ICD-10-CM

## 2024-10-17 DIAGNOSIS — Z71.89 CARE PLAN DISCUSSED WITH PATIENT: ICD-10-CM

## 2024-10-17 DIAGNOSIS — D72.818 OTHER DECREASED WHITE BLOOD CELL (WBC) COUNT: ICD-10-CM

## 2024-10-17 LAB
BASOPHILS # BLD: 0.03 K/UL (ref 0.01–0.08)
BASOPHILS NFR BLD: 0.6 % (ref 0.1–1.2)
EOSINOPHIL # BLD: 0.16 K/UL (ref 0.04–0.54)
EOSINOPHIL NFR BLD: 3.1 % (ref 0.7–7)
ERYTHROCYTE [DISTWIDTH] IN BLOOD BY AUTOMATED COUNT: 16.2 % (ref 11.7–14.4)
HCT VFR BLD AUTO: 34 % (ref 34.1–44.9)
HGB BLD-MCNC: 11.8 G/DL (ref 11.2–15.7)
LYMPHOCYTES # BLD: 1.07 K/UL (ref 1.18–3.74)
LYMPHOCYTES NFR BLD: 21 % (ref 19.3–53.1)
MCH RBC QN AUTO: 32.2 PG (ref 25.6–32.2)
MCHC RBC AUTO-ENTMCNC: 34.7 G/DL (ref 32.3–35.5)
MCV RBC AUTO: 92.6 FL (ref 79.4–94.8)
MONOCYTES # BLD: 0.3 K/UL (ref 0.24–0.82)
MONOCYTES NFR BLD: 5.9 % (ref 4.7–12.5)
NEUTROPHILS # BLD: 3.49 K/UL (ref 1.56–6.13)
NEUTS SEG NFR BLD: 68.6 % (ref 34–71.1)
PLATELET # BLD AUTO: 67 K/UL (ref 182–369)
PMV BLD AUTO: 10.2 FL (ref 7.4–10.4)
RBC # BLD AUTO: 3.67 M/UL (ref 3.93–5.22)
WBC # BLD AUTO: 5.09 K/UL (ref 3.98–10.04)

## 2024-10-17 PROCEDURE — 1123F ACP DISCUSS/DSCN MKR DOCD: CPT | Performed by: NURSE PRACTITIONER

## 2024-10-17 PROCEDURE — G8399 PT W/DXA RESULTS DOCUMENT: HCPCS | Performed by: INTERNAL MEDICINE

## 2024-10-17 PROCEDURE — 99213 OFFICE O/P EST LOW 20 MIN: CPT | Performed by: NURSE PRACTITIONER

## 2024-10-17 PROCEDURE — 1036F TOBACCO NON-USER: CPT | Performed by: NURSE PRACTITIONER

## 2024-10-17 PROCEDURE — G8399 PT W/DXA RESULTS DOCUMENT: HCPCS | Performed by: NURSE PRACTITIONER

## 2024-10-17 PROCEDURE — G8427 DOCREV CUR MEDS BY ELIG CLIN: HCPCS | Performed by: INTERNAL MEDICINE

## 2024-10-17 PROCEDURE — G8484 FLU IMMUNIZE NO ADMIN: HCPCS | Performed by: NURSE PRACTITIONER

## 2024-10-17 PROCEDURE — G8484 FLU IMMUNIZE NO ADMIN: HCPCS | Performed by: INTERNAL MEDICINE

## 2024-10-17 PROCEDURE — 1036F TOBACCO NON-USER: CPT | Performed by: INTERNAL MEDICINE

## 2024-10-17 PROCEDURE — 36415 COLL VENOUS BLD VENIPUNCTURE: CPT

## 2024-10-17 PROCEDURE — 99212 OFFICE O/P EST SF 10 MIN: CPT

## 2024-10-17 PROCEDURE — G8417 CALC BMI ABV UP PARAM F/U: HCPCS | Performed by: INTERNAL MEDICINE

## 2024-10-17 PROCEDURE — 85025 COMPLETE CBC W/AUTO DIFF WBC: CPT

## 2024-10-17 PROCEDURE — 1090F PRES/ABSN URINE INCON ASSESS: CPT | Performed by: NURSE PRACTITIONER

## 2024-10-17 PROCEDURE — 1123F ACP DISCUSS/DSCN MKR DOCD: CPT | Performed by: INTERNAL MEDICINE

## 2024-10-17 PROCEDURE — G8427 DOCREV CUR MEDS BY ELIG CLIN: HCPCS | Performed by: NURSE PRACTITIONER

## 2024-10-17 PROCEDURE — 3075F SYST BP GE 130 - 139MM HG: CPT | Performed by: NURSE PRACTITIONER

## 2024-10-17 PROCEDURE — 3079F DIAST BP 80-89 MM HG: CPT | Performed by: NURSE PRACTITIONER

## 2024-10-17 PROCEDURE — 1090F PRES/ABSN URINE INCON ASSESS: CPT | Performed by: INTERNAL MEDICINE

## 2024-10-17 PROCEDURE — G8417 CALC BMI ABV UP PARAM F/U: HCPCS | Performed by: NURSE PRACTITIONER

## 2024-10-17 RX ORDER — CEPHALEXIN 500 MG/1
500 CAPSULE ORAL 3 TIMES DAILY
COMMUNITY
End: 2024-10-17 | Stop reason: ALTCHOICE

## 2024-10-17 ASSESSMENT — ENCOUNTER SYMPTOMS
WHEEZING: 0
EYE DISCHARGE: 0
VOMITING: 0
CONSTIPATION: 1
ABDOMINAL PAIN: 0
TROUBLE SWALLOWING: 0
SORE THROAT: 0
ROS SKIN COMMENTS: BILATERAL LOWER EXTREMITY CELLULITIS
COLOR CHANGE: 1
DIARRHEA: 0
NAUSEA: 0
COUGH: 0
EYE ITCHING: 0
SHORTNESS OF BREATH: 0

## 2024-10-17 NOTE — PROGRESS NOTES
[facility]  [facility] INTERNAL MEDICINE  [address] [place] [address] DRIVE  [address]  [place] [address]  Dept: [phone]  Dept Fax: [phone]  Loc: [phone]      Visit Date: 10/17/2024    Yuli Casarez a 79 y.o. female who presents today for:  Chief Complaint   Patient presents with    Follow-up         HPI:     Patient is here for follow-up on her bilateral cellulitis.    Social History     Tobacco Use    Smoking status: Never    Smokeless tobacco: Never   Substance Use Topics    Alcohol use: Never      Current Outpatient Medications   Medication Sig Dispense Refill    hydroCHLOROthiazide 12.5 MG capsule TAKE 1 CAPSULE BY MOUTH EVERY DAY 30 capsule 3    levothyroxine (SYNTHROID) 200 MCG tablet Take 1 tablet by mouth daily 30 tablet 3    vitamin D (ERGOCALCIFEROL) 1.25 MG (25156 UT) CAPS capsule TAKE 1 CAPSULE BY MOUTH ONE TIME PER WEEK 12 capsule 1    escitalopram (LEXAPRO) 20 MG tablet TAKE 1 TABLET BY MOUTH EVERY DAY EVERY MORNING      traZODone (DESYREL) 50 MG tablet TAKE 1 TABLET BY MOUTH EVERY DAY EVERY EVENING      bumetanide (BUMEX) 0.5 MG tablet Take 1 tablet by mouth every morning 180 tablet 1    b complex vitamins capsule Take 1 capsule by mouth daily       No current facility-administered medications for this visit.     No Known Allergies      Subjective:      Review of Systems   Skin:  Positive for wound.        Bilateral lower extremity cellulitis       Objective:     Pulse 78   Ht 1.651 m (5' 5\")   Wt 110.7 kg (244 lb)   SpO2 98%   BMI 40.60 kg/m²   Physical Exam  Skin:     General: Skin is warm.      Findings: Erythema present.      Comments: Worsening bilateral lower extremity cellulitis with wounds on both anterior shins          Assessment:      Diagnosis Orders   1. Bilateral cellulitis of lower leg              Assessment & Plan   Plan:     Worsening lower extremity cellulitis.  She has been on 2 rounds of oral antibiotics and 10 days worth of IV Rocephin and I

## 2024-10-18 ENCOUNTER — HOSPITAL ENCOUNTER (OUTPATIENT)
Dept: WOUND CARE | Age: 79
Discharge: HOME | End: 2024-10-18
Payer: MEDICARE

## 2024-10-18 VITALS
SYSTOLIC BLOOD PRESSURE: 181 MMHG | DIASTOLIC BLOOD PRESSURE: 83 MMHG | WEIGHT: 247 LBS | BODY MASS INDEX: 41.15 KG/M2 | HEIGHT: 65 IN | HEART RATE: 95 BPM | TEMPERATURE: 97.7 F | RESPIRATION RATE: 18 BRPM

## 2024-10-18 DIAGNOSIS — E55.9 VITAMIN D DEFICIENCY: ICD-10-CM

## 2024-10-18 DIAGNOSIS — I89.0 LYMPHEDEMA: ICD-10-CM

## 2024-10-18 DIAGNOSIS — L30.9 DERMATITIS: Primary | ICD-10-CM

## 2024-10-18 PROCEDURE — 29580 STRAPPING UNNA BOOT: CPT

## 2024-10-18 PROCEDURE — 99214 OFFICE O/P EST MOD 30 MIN: CPT

## 2024-10-18 PROCEDURE — 99215 OFFICE O/P EST HI 40 MIN: CPT | Performed by: NURSE PRACTITIONER

## 2024-10-18 RX ORDER — NEOMYCIN/BACITRACIN/POLYMYXINB 3.5-400-5K
OINTMENT (GRAM) TOPICAL ONCE
OUTPATIENT
Start: 2024-10-18 | End: 2024-10-18

## 2024-10-18 RX ORDER — LIDOCAINE HYDROCHLORIDE 20 MG/ML
JELLY TOPICAL ONCE
OUTPATIENT
Start: 2024-10-18 | End: 2024-10-18

## 2024-10-18 RX ORDER — SILVER SULFADIAZINE 10 MG/G
CREAM TOPICAL ONCE
OUTPATIENT
Start: 2024-10-18 | End: 2024-10-18

## 2024-10-18 RX ORDER — GENTAMICIN SULFATE 1 MG/G
OINTMENT TOPICAL ONCE
OUTPATIENT
Start: 2024-10-18 | End: 2024-10-18

## 2024-10-18 RX ORDER — BETAMETHASONE DIPROPIONATE 0.5 MG/G
CREAM TOPICAL ONCE
OUTPATIENT
Start: 2024-10-18 | End: 2024-10-18

## 2024-10-18 RX ORDER — LIDOCAINE 50 MG/G
OINTMENT TOPICAL ONCE
OUTPATIENT
Start: 2024-10-18 | End: 2024-10-18

## 2024-10-18 RX ORDER — TRIAMCINOLONE ACETONIDE 1 MG/G
OINTMENT TOPICAL ONCE
OUTPATIENT
Start: 2024-10-18 | End: 2024-10-18

## 2024-10-18 RX ORDER — SODIUM CHLOR/HYPOCHLOROUS ACID 0.033 %
SOLUTION, IRRIGATION IRRIGATION ONCE
OUTPATIENT
Start: 2024-10-18 | End: 2024-10-18

## 2024-10-18 RX ORDER — MUPIROCIN 20 MG/G
OINTMENT TOPICAL ONCE
OUTPATIENT
Start: 2024-10-18 | End: 2024-10-18

## 2024-10-18 RX ORDER — LIDOCAINE HYDROCHLORIDE 40 MG/ML
SOLUTION TOPICAL ONCE
OUTPATIENT
Start: 2024-10-18 | End: 2024-10-18

## 2024-10-18 RX ORDER — BACITRACIN ZINC AND POLYMYXIN B SULFATE 500; 1000 [USP'U]/G; [USP'U]/G
OINTMENT TOPICAL ONCE
OUTPATIENT
Start: 2024-10-18 | End: 2024-10-18

## 2024-10-18 RX ORDER — CLOBETASOL PROPIONATE 0.5 MG/G
OINTMENT TOPICAL ONCE
OUTPATIENT
Start: 2024-10-18 | End: 2024-10-18

## 2024-10-18 RX ORDER — LIDOCAINE 40 MG/G
CREAM TOPICAL ONCE
OUTPATIENT
Start: 2024-10-18 | End: 2024-10-18

## 2024-10-18 RX ORDER — GINSENG 100 MG
CAPSULE ORAL ONCE
OUTPATIENT
Start: 2024-10-18 | End: 2024-10-18

## 2024-10-18 ASSESSMENT — ENCOUNTER SYMPTOMS: COLOR CHANGE: 1

## 2024-10-18 ASSESSMENT — VISUAL ACUITY: OU: 1

## 2024-10-18 NOTE — PATIENT INSTRUCTIONS
[facility]   Physician Orders and Discharge Instructions  [address] [place] [address]  [address]  [place] [address]  Telephone: [phone]      FAX [phone]    NAME:  Yuli Duran  YOB: 1945  MEDICAL RECORD NUMBER:  [id number]  DATE:  10/18/2024    Discharge condition: Stable    Discharge to: Home    Left via:Private automobile    Accompanied by:  child    ECF/HHA: [facility]    Dressing Orders:  Left and right legs: Wash with soap and water. Apply Coflex Lite Unnaboot from toes to knee. Change once weekly .      Compression Therapy is an important part of your program of care. Compression makes it easier for your body to pump the blood from your legs back to your heart, helping alleviate a condition called chronic venous insufficiency. Chronic venous insufficiency is an underlying cause of your injury, and managing it properly will help you to heal.      Keep the bandage in place at all times unless you experience foot pain or numbness or coolness of the toes. Do not attempt to remove and reapply the bandage system.  Give it time.  Your bandage may feel tight at first. This is normal. Your bandage will become more comfortable as swelling goes down.  Be active, as suggested by your healthcare provider. Daily walks or mild exercise encourages better blood flow to aid healing.  Put your feet up when sitting for long periods of time.  Keep the bandage dry. Wet compression bandages are more likely to slip down and/or cause damage to good skin.    PRECAUTIONS For your safety, compression therapy should be used under the supervision of a healthcare professional. Contact your care provider if you experience any of the following:  Pain that does not go away with elevation.  Discoloration or numbness of the toes.  The bandage slipping out of place  Fluid leaking through the bandage.    Treatment Orders:  Protein rich diet (unless restricted by

## 2024-10-18 NOTE — PROGRESS NOTES
(Temporal)   Resp 18   Ht 1.651 m (5' 5\")   Wt 112 kg (247 lb)   BMI 41.10 kg/m²     Physical Exam  Vitals reviewed. Exam conducted with a chaperone present.   Constitutional:       Appearance: Normal appearance. She is obese.   HENT:      Head: Normocephalic and atraumatic.      Right Ear: External ear normal.      Left Ear: External ear normal.   Eyes:      General: Lids are normal. Lids are everted, no foreign bodies appreciated. Vision grossly intact. Gaze aligned appropriately.   Cardiovascular:      Rate and Rhythm: Normal rate and regular rhythm.      Pulses: Normal pulses.      Heart sounds: Normal heart sounds.   Pulmonary:      Effort: Pulmonary effort is normal.      Breath sounds: Normal breath sounds.   Abdominal:      General: Bowel sounds are normal.   Musculoskeletal:         General: Swelling present. Normal range of motion.      Right lower leg: Edema present.      Left lower leg: Edema present.   Skin:     General: Skin is warm and dry.      Capillary Refill: Capillary refill takes 2 to 3 seconds.      Findings: Erythema present.          Neurological:      Mental Status: She is oriented to person, place, and time.   Psychiatric:         Mood and Affect: Mood normal.         Behavior: Behavior normal.         Thought Content: Thought content normal.         Judgment: Judgment normal.             Post Debridement Measurements and Assessment:    The patientspain is   .    Please refer to nursing measurements and assessment regarding wound pre and postdebridement.     Assessment    1. Dermatitis    2. Vitamin D deficiency    3. Lymphedema          Plan    DEMETRIUS in clinic    Plan for wound - Dress per physician order  Treatment:     Compression : Yes   Offloading : No   Dressing : coban lite compression    Discussed importance of compression, leg elevation, nutrition, and plan of care.  Patient understanding and questions answered.     I spent a total of  60 minutes face to face with the patient.

## 2024-10-18 NOTE — PLAN OF CARE
Unna Boot Application   Below Knee    NAME:  Yuli Duran  YOB: 1945  MEDICAL RECORD NUMBER:  [id number]  DATE:  10/18/2024    Unna boot: Applied moisturizing agent to dry skin as needed.   Appied primary and secondary dressing as ordered.  Applied Unna roll from toes to knee overlapping each time.   Applied ace wrap or coban from toes to below the knee.   Instructed patient/caregiver to keep dressing dry and intact. DO NOT REMOVE DRESSING.   Instructed pt/family/caregiver to report excessive draining, loose bandage, wet dressing, severe pain or tingling in toes.  Applied Unna Boot dressing below the knee to right lower leg.  Applied Unna Boot dressing below the knee to left lower leg.    Unna Boot(s) were applied per  Guidelines.     Electronically signed by Nidia Wu RN on 10/18/2024 at 9:50 AM

## 2024-10-18 NOTE — DISCHARGE INSTRUCTIONS
[facility] Care and Hyper[facility] Oxygen Therapy   Physician Orders and Discharge Instructions  [address] [place] [address]  [address]  [place] [address]  Telephone: [phone]      FAX [phone]    NAME:  Yuli Duran  YOB: 1945  MEDICAL RECORD NUMBER:  [id number]  DATE:  10/18/2024    Discharge condition: Stable    Discharge to: Home    Left via:Private automobile    Accompanied by:  child    ECF/HHA: [facility]    Dressing Orders:  Left and right legs: Wash with soap and water. Apply Coflex Lite Unnaboot from toes to knee. Change once weekly .      Compression Therapy is an important part of your program of care. Compression makes it easier for your body to pump the blood from your legs back to your heart, helping alleviate a condition called chronic venous insufficiency. Chronic venous insufficiency is an underlying cause of your injury, and managing it properly will help you to heal.      Keep the bandage in place at all times unless you experience foot pain or numbness or coolness of the toes. Do not attempt to remove and reapply the bandage system.  Give it time.  Your bandage may feel tight at first. This is normal. Your bandage will become more comfortable as swelling goes down.  Be active, as suggested by your healthcare provider. Daily walks or mild exercise encourages better blood flow to aid healing.  Put your feet up when sitting for long periods of time.  Keep the bandage dry. Wet compression bandages are more likely to slip down and/or cause damage to good skin.    PRECAUTIONS For your safety, compression therapy should be used under the supervision of a healthcare professional. Contact your care provider if you experience any of the following:  Pain that does not go away with elevation.  Discoloration or numbness of the toes.  The bandage slipping out of place  Fluid leaking through the bandage.    Treatment Orders:  Protein rich diet (unless restricted by

## 2024-10-21 LAB — ZINC SERPL-MCNC: 48.7 UG/DL (ref 60–120)

## 2024-10-23 ENCOUNTER — HOSPITAL ENCOUNTER (OUTPATIENT)
Dept: WOUND CARE | Age: 79
Discharge: HOME | End: 2024-10-23
Payer: MEDICARE

## 2024-10-23 VITALS
DIASTOLIC BLOOD PRESSURE: 83 MMHG | WEIGHT: 247 LBS | RESPIRATION RATE: 18 BRPM | HEART RATE: 95 BPM | SYSTOLIC BLOOD PRESSURE: 181 MMHG | TEMPERATURE: 97.7 F | BODY MASS INDEX: 41.15 KG/M2 | HEIGHT: 65 IN

## 2024-10-23 DIAGNOSIS — L30.9 DERMATITIS: Primary | ICD-10-CM

## 2024-10-23 DIAGNOSIS — I89.0 LYMPHEDEMA: ICD-10-CM

## 2024-10-23 DIAGNOSIS — Z09 FOLLOW-UP EXAM: ICD-10-CM

## 2024-10-23 DIAGNOSIS — Z87.2 PERSONAL HISTORY OF DISEASE OF SKIN AND SUBCUTANEOUS TISSUE: ICD-10-CM

## 2024-10-23 PROCEDURE — 99213 OFFICE O/P EST LOW 20 MIN: CPT | Performed by: NURSE PRACTITIONER

## 2024-10-23 PROCEDURE — 99212 OFFICE O/P EST SF 10 MIN: CPT

## 2024-10-23 RX ORDER — GENTAMICIN SULFATE 1 MG/G
OINTMENT TOPICAL ONCE
Status: CANCELLED | OUTPATIENT
Start: 2024-10-23 | End: 2024-10-23

## 2024-10-23 RX ORDER — CLOBETASOL PROPIONATE 0.5 MG/G
OINTMENT TOPICAL ONCE
Status: CANCELLED | OUTPATIENT
Start: 2024-10-23 | End: 2024-10-23

## 2024-10-23 RX ORDER — SODIUM CHLOR/HYPOCHLOROUS ACID 0.033 %
SOLUTION, IRRIGATION IRRIGATION ONCE
Status: CANCELLED | OUTPATIENT
Start: 2024-10-23 | End: 2024-10-23

## 2024-10-23 RX ORDER — SILVER SULFADIAZINE 10 MG/G
CREAM TOPICAL ONCE
Status: CANCELLED | OUTPATIENT
Start: 2024-10-23 | End: 2024-10-23

## 2024-10-23 RX ORDER — TRIAMCINOLONE ACETONIDE 1 MG/G
OINTMENT TOPICAL ONCE
Status: CANCELLED | OUTPATIENT
Start: 2024-10-23 | End: 2024-10-23

## 2024-10-23 RX ORDER — NEOMYCIN/BACITRACIN/POLYMYXINB 3.5-400-5K
OINTMENT (GRAM) TOPICAL ONCE
Status: CANCELLED | OUTPATIENT
Start: 2024-10-23 | End: 2024-10-23

## 2024-10-23 RX ORDER — LIDOCAINE HYDROCHLORIDE 20 MG/ML
JELLY TOPICAL ONCE
Status: CANCELLED | OUTPATIENT
Start: 2024-10-23 | End: 2024-10-23

## 2024-10-23 RX ORDER — BACITRACIN ZINC AND POLYMYXIN B SULFATE 500; 1000 [USP'U]/G; [USP'U]/G
OINTMENT TOPICAL ONCE
Status: CANCELLED | OUTPATIENT
Start: 2024-10-23 | End: 2024-10-23

## 2024-10-23 RX ORDER — LIDOCAINE 50 MG/G
OINTMENT TOPICAL ONCE
Status: CANCELLED | OUTPATIENT
Start: 2024-10-23 | End: 2024-10-23

## 2024-10-23 RX ORDER — BETAMETHASONE DIPROPIONATE 0.5 MG/G
CREAM TOPICAL ONCE
Status: CANCELLED | OUTPATIENT
Start: 2024-10-23 | End: 2024-10-23

## 2024-10-23 RX ORDER — GINSENG 100 MG
CAPSULE ORAL ONCE
Status: CANCELLED | OUTPATIENT
Start: 2024-10-23 | End: 2024-10-23

## 2024-10-23 RX ORDER — LIDOCAINE 40 MG/G
CREAM TOPICAL ONCE
Status: CANCELLED | OUTPATIENT
Start: 2024-10-23 | End: 2024-10-23

## 2024-10-23 RX ORDER — LIDOCAINE HYDROCHLORIDE 40 MG/ML
SOLUTION TOPICAL ONCE
Status: CANCELLED | OUTPATIENT
Start: 2024-10-23 | End: 2024-10-23

## 2024-10-23 RX ORDER — MUPIROCIN 20 MG/G
OINTMENT TOPICAL ONCE
Status: CANCELLED | OUTPATIENT
Start: 2024-10-23 | End: 2024-10-23

## 2024-10-23 NOTE — PROGRESS NOTES
[facility]   Progress Note and Procedure Note      Yuli Duran  MEDICAL RECORD NUMBER:  [id number]  AGE: 79 y.o.   GENDER: female  : 1945  EPISODE DATE:  10/23/2024    Subjective:     Chief Complaint   Patient presents with    Wound Check     Bilateral leg redness         HISTORY of PRESENT ILLNESS HPI     Yuli Duran is a 79 y.o. female who presents today for wound/ulcer evaluation.   History of Wound Context: bilateral leg dermaitis     PAST MEDICAL HISTORY        Diagnosis Date    Anxiety     Bruising     Diabetes mellitus (HCC)     Edema     Fracture of nasal bone     History of cellulitis     History of constipation     medication induced    Hypertension     Non-alcoholic cirrhosis (HCC)     SDH (subdural hematoma) 10/30/2021    small, post fall ([facility]    Sleep disorder     Splenomegaly     Thyroid disease        PAST SURGICAL HISTORY    Past Surgical History:   Procedure Laterality Date    CHOLECYSTECTOMY      COLONOSCOPY      normal, per pt    COLONOSCOPY N/A 2023    Dr FAVIO Sandoval-AP x 1, 5 yr recall    HYSTERECTOMY (CERVIX STATUS UNKNOWN)      [facility] STEROT[facility] LOC BREAST BIOPSY RIGHT Right 2022    [facility] STEROTACTIC LOC BREAST BIOPSY RIGHT 2022 [facility]    OVARY REMOVAL Bilateral     AGE 55       FAMILY HISTORY    Family History   Problem Relation Age of Onset    Alcohol Abuse Mother     Heart Disease Mother     Lung Cancer Mother     Abdominal aortic aneurysm Mother     Stroke Mother     Breast Cancer Maternal Aunt 72    Breast Cancer Maternal Aunt 74    Stomach Cancer Maternal Uncle 65        Great Uncle    Alcohol Abuse Maternal Grandmother     Epilepsy Maternal Grandmother     Heart Failure Maternal Grandfather     Unknown Paternal Grandmother     Unknown Paternal Grandfather     Breast Cancer Maternal Cousin     Colon Polyps Neg Hx     Colon Cancer Neg Hx        SOCIAL HISTORY    Social History

## 2024-10-23 NOTE — PATIENT INSTRUCTIONS
[facility]   Physician Orders and Discharge Instructions  [address] [place] [address]  [address]  [place] [address]  Telephone: [phone]      FAX [phone]    NAME:  Yuli Duran  YOB: 1945  MEDICAL RECORD NUMBER:  [id number]  DATE:  10/23/2024    Discharge condition: Stable    Discharge to: Home    Left via:Private automobile    Accompanied by:  child    ECF/HHA: [facility]  [phone]    1) Moisturize your legs with a good thick cream like Eucerin Cream, Aquaphor, Cocoa Butter (in a jar), or Vaseline at least twice daily.  Dry skin has a greater likelihood of breaking down if it is stretched by swelling.     2) Elevate legs when sitting, above the heart preferably.  Avoid sleeping in a recliner.    3) Avoid standing for long periods of time.    4) Walking is good.     5)  Wear knee high graduated compression stockings 20 mmHg on both legs.  (This is what prevents these ulcerations)  Put these on in the morning no more than 20 minutes after rising and remove before going to bed.  You can hand wash and hang to dry overnight. We recommend having two pairs to rotate washing and wearing.     6) Be very careful with your legs.  Often these are caused by a bump or scrape that doesn't heal well.  Healing is prevented or slowed by your Venous insufficiency.  Compression and keeping the swelling out of your legs is the key to the condition of your skin.    7) You can buy these stockings  at many pharmacies and even online, but you must have a calf and ankle measurement (taken in the morning before your legs swell) to order them. You will also need the strength that the physician has ordered.  Most insurance companies do not pay for these unless you have an open ulceration currently.    8)  Replace your stockings every 3-6 months as they stretch out and become less effective.     9) Compression stockings should be handwashed and

## 2024-10-23 NOTE — DISCHARGE INSTRUCTIONS
air-dryed to continue to work properly.    10) If you are overweight, losing weight can be helpful.    [facility] follow up visit __as needed___________________________  (Please note your next appointment above and if you are unable to keep, kindly give a 24 hour notice. Thank you.)          If you experience any of the following, please call the Wound Care Center during business hours:    * Increase in Pain  * Temperature over 101  * Increase in drainage from your wound  * Drainage with a foul odor  * Bleeding  * Increase in swelling  * Need for compression bandage changes due to slippage, breakthrough drainage.    If you need medical attention outside of the business hours of the [facility] Centers please contact your PCP or go to the nearest emergency room.

## 2024-10-23 NOTE — PLAN OF CARE
Problem: Wound:  Goal: Will show signs of wound healing; wound closure and no evidence of infection  Description: Will show signs of wound healing; wound closure and no evidence of infection  Outcome: Completed     Problem: Compression therapy:  Goal: Will be free from complications associated with compression therapy  Description: Will be free from complications associated with compression therapy  Outcome: Completed   Lymphedema pumps have been ordered, rep to contact patient re: teaching   stated

## 2024-11-18 ENCOUNTER — TELEPHONE (OUTPATIENT)
Dept: HEMATOLOGY | Age: 79
End: 2024-11-18

## 2024-11-18 NOTE — TELEPHONE ENCOUNTER
Attempted to contact patient regarding low zinc level. No answer left voicemail for return phone call.

## 2024-11-22 PROBLEM — Z09 FOLLOW-UP EXAM: Status: RESOLVED | Noted: 2024-10-23 | Resolved: 2024-11-22

## 2024-11-26 ENCOUNTER — TELEPHONE (OUTPATIENT)
Dept: HEMATOLOGY | Age: 79
End: 2024-11-26

## 2024-11-26 NOTE — TELEPHONE ENCOUNTER
Attempted again to contact patient to inform her of low zinc level and to begin zinc supplement. No answer from patient, left voicemail.

## 2024-12-16 ENCOUNTER — OFFICE VISIT (OUTPATIENT)
Dept: INTERNAL MEDICINE | Age: 79
End: 2024-12-16
Payer: MEDICARE

## 2024-12-16 VITALS
BODY MASS INDEX: 40.32 KG/M2 | DIASTOLIC BLOOD PRESSURE: 80 MMHG | HEART RATE: 70 BPM | HEIGHT: 65 IN | OXYGEN SATURATION: 97 % | WEIGHT: 242 LBS | SYSTOLIC BLOOD PRESSURE: 138 MMHG

## 2024-12-16 DIAGNOSIS — E11.9 CONTROLLED TYPE 2 DIABETES MELLITUS WITHOUT COMPLICATION, WITHOUT LONG-TERM CURRENT USE OF INSULIN (HCC): ICD-10-CM

## 2024-12-16 DIAGNOSIS — K75.81 NASH (NONALCOHOLIC STEATOHEPATITIS): ICD-10-CM

## 2024-12-16 DIAGNOSIS — I10 PRIMARY HYPERTENSION: Primary | ICD-10-CM

## 2024-12-16 DIAGNOSIS — L03.90 CHRONIC CELLULITIS: ICD-10-CM

## 2024-12-16 DIAGNOSIS — I10 PRIMARY HYPERTENSION: ICD-10-CM

## 2024-12-16 DIAGNOSIS — E03.8 OTHER SPECIFIED HYPOTHYROIDISM: ICD-10-CM

## 2024-12-16 DIAGNOSIS — R60.0 LOCALIZED EDEMA: ICD-10-CM

## 2024-12-16 LAB
ALBUMIN SERPL-MCNC: 3.1 G/DL (ref 3.5–5.2)
ALP SERPL-CCNC: 109 U/L (ref 35–104)
ALT SERPL-CCNC: 14 U/L (ref 5–33)
ANION GAP SERPL CALCULATED.3IONS-SCNC: 9 MMOL/L (ref 7–19)
AST SERPL-CCNC: 32 U/L (ref 5–32)
BILIRUB SERPL-MCNC: 2.5 MG/DL (ref 0.2–1.2)
BUN SERPL-MCNC: 12 MG/DL (ref 8–23)
CALCIUM SERPL-MCNC: 8.9 MG/DL (ref 8.8–10.2)
CHLORIDE SERPL-SCNC: 102 MMOL/L (ref 98–111)
CHOLEST SERPL-MCNC: 140 MG/DL (ref 0–199)
CO2 SERPL-SCNC: 29 MMOL/L (ref 22–29)
CREAT SERPL-MCNC: 0.7 MG/DL (ref 0.5–0.9)
GLUCOSE SERPL-MCNC: 118 MG/DL (ref 70–99)
HDLC SERPL-MCNC: 68 MG/DL (ref 40–60)
LDLC SERPL CALC-MCNC: 59 MG/DL
POTASSIUM SERPL-SCNC: 4 MMOL/L (ref 3.5–5)
PROT SERPL-MCNC: 6.5 G/DL (ref 6.4–8.3)
SODIUM SERPL-SCNC: 140 MMOL/L (ref 136–145)
T4 FREE SERPL-MCNC: 1.74 NG/DL (ref 0.93–1.7)
TRIGL SERPL-MCNC: 65 MG/DL (ref 0–149)
TSH SERPL DL<=0.005 MIU/L-ACNC: 0.92 UIU/ML (ref 0.27–4.2)

## 2024-12-16 PROCEDURE — 3075F SYST BP GE 130 - 139MM HG: CPT | Performed by: INTERNAL MEDICINE

## 2024-12-16 PROCEDURE — 1124F ACP DISCUSS-NO DSCNMKR DOCD: CPT | Performed by: INTERNAL MEDICINE

## 2024-12-16 PROCEDURE — G8427 DOCREV CUR MEDS BY ELIG CLIN: HCPCS | Performed by: INTERNAL MEDICINE

## 2024-12-16 PROCEDURE — 1090F PRES/ABSN URINE INCON ASSESS: CPT | Performed by: INTERNAL MEDICINE

## 2024-12-16 PROCEDURE — 3079F DIAST BP 80-89 MM HG: CPT | Performed by: INTERNAL MEDICINE

## 2024-12-16 PROCEDURE — G8484 FLU IMMUNIZE NO ADMIN: HCPCS | Performed by: INTERNAL MEDICINE

## 2024-12-16 PROCEDURE — 1159F MED LIST DOCD IN RCRD: CPT | Performed by: INTERNAL MEDICINE

## 2024-12-16 PROCEDURE — G8399 PT W/DXA RESULTS DOCUMENT: HCPCS | Performed by: INTERNAL MEDICINE

## 2024-12-16 PROCEDURE — G8417 CALC BMI ABV UP PARAM F/U: HCPCS | Performed by: INTERNAL MEDICINE

## 2024-12-16 PROCEDURE — 99214 OFFICE O/P EST MOD 30 MIN: CPT | Performed by: INTERNAL MEDICINE

## 2024-12-16 PROCEDURE — 1036F TOBACCO NON-USER: CPT | Performed by: INTERNAL MEDICINE

## 2024-12-16 RX ORDER — ERGOCALCIFEROL 1.25 MG/1
50000 CAPSULE, LIQUID FILLED ORAL WEEKLY
Qty: 12 CAPSULE | Refills: 1 | Status: SHIPPED | OUTPATIENT
Start: 2024-12-16

## 2024-12-16 RX ORDER — HYDROCHLOROTHIAZIDE 12.5 MG/1
CAPSULE ORAL
Qty: 30 CAPSULE | Refills: 3 | Status: SHIPPED | OUTPATIENT
Start: 2024-12-16

## 2024-12-16 RX ORDER — LEVOTHYROXINE SODIUM 200 UG/1
200 TABLET ORAL DAILY
Qty: 30 TABLET | Refills: 3 | Status: SHIPPED | OUTPATIENT
Start: 2024-12-16

## 2024-12-16 ASSESSMENT — ENCOUNTER SYMPTOMS
EYE DISCHARGE: 0
SORE THROAT: 0
ABDOMINAL PAIN: 0
SHORTNESS OF BREATH: 0
SINUS PRESSURE: 0
VOMITING: 0
WHEEZING: 0
BACK PAIN: 0
NAUSEA: 0
BLOOD IN STOOL: 0
ABDOMINAL DISTENTION: 0
EYE ITCHING: 0
TROUBLE SWALLOWING: 0

## 2024-12-16 NOTE — PROGRESS NOTES
diaphoretic.   HENT:      Head: Normocephalic and atraumatic.      Right Ear: External ear normal.      Left Ear: External ear normal.      Nose: Nose normal.      Mouth/Throat:      Pharynx: No oropharyngeal exudate.   Eyes:      General: No scleral icterus.        Right eye: No discharge.         Left eye: No discharge.      Conjunctiva/sclera: Conjunctivae normal.      Pupils: Pupils are equal, round, and reactive to light.   Neck:      Thyroid: No thyromegaly.      Vascular: No JVD.      Trachea: No tracheal deviation.   Cardiovascular:      Rate and Rhythm: Normal rate and regular rhythm.      Heart sounds: Normal heart sounds. No murmur heard.     No friction rub. No gallop.   Pulmonary:      Effort: Pulmonary effort is normal. No respiratory distress.      Breath sounds: Normal breath sounds. No wheezing or rales.   Abdominal:      General: Bowel sounds are normal. There is no distension.      Palpations: Abdomen is soft. There is no mass.      Tenderness: There is no abdominal tenderness. There is no guarding or rebound.   Musculoskeletal:         General: No tenderness or deformity. Normal range of motion.      Cervical back: Normal range of motion and neck supple.      Right lower leg: Edema present.      Left lower leg: Edema present.   Lymphadenopathy:      Cervical: No cervical adenopathy.   Skin:     General: Skin is warm and dry.      Coloration: Skin is not pale.      Findings: Erythema present. No rash.   Neurological:      Mental Status: She is alert and oriented to person, place, and time.      Cranial Nerves: No cranial nerve deficit.      Motor: No abnormal muscle tone.      Coordination: Coordination normal.      Deep Tendon Reflexes: Reflexes are normal and symmetric. Reflexes normal.   Psychiatric:         Behavior: Behavior normal.         Thought Content: Thought content normal.         Judgment: Judgment normal.          Assessment:      Diagnosis Orders   1. Primary hypertension        2.

## 2025-01-07 ENCOUNTER — APPOINTMENT (OUTPATIENT)
Dept: GENERAL RADIOLOGY | Age: 80
DRG: 442 | End: 2025-01-07
Payer: MEDICARE

## 2025-01-07 ENCOUNTER — HOSPITAL ENCOUNTER (INPATIENT)
Age: 80
LOS: 7 days | Discharge: HOME | DRG: 442 | End: 2025-01-14
Attending: EMERGENCY MEDICINE | Admitting: INTERNAL MEDICINE
Payer: MEDICARE

## 2025-01-07 ENCOUNTER — APPOINTMENT (OUTPATIENT)
Dept: CT IMAGING | Age: 80
DRG: 442 | End: 2025-01-07
Payer: MEDICARE

## 2025-01-07 DIAGNOSIS — K76.82 HEPATIC ENCEPHALOPATHY (HCC): ICD-10-CM

## 2025-01-07 DIAGNOSIS — R60.0 BILATERAL LOWER EXTREMITY EDEMA: ICD-10-CM

## 2025-01-07 DIAGNOSIS — E03.9 HYPOTHYROIDISM, UNSPECIFIED TYPE: ICD-10-CM

## 2025-01-07 DIAGNOSIS — R41.82 ALTERED MENTAL STATUS, UNSPECIFIED ALTERED MENTAL STATUS TYPE: Primary | ICD-10-CM

## 2025-01-07 LAB
ALBUMIN SERPL-MCNC: 3.2 G/DL (ref 3.5–5.2)
ALLENS TEST: ABNORMAL
ALP SERPL-CCNC: 92 U/L (ref 35–104)
ALT SERPL-CCNC: 17 U/L (ref 5–33)
AMMONIA PLAS-SCNC: 89 UMOL/L (ref 11–51)
AMPHET UR QL SCN: NEGATIVE
ANION GAP SERPL CALCULATED.3IONS-SCNC: 9 MMOL/L (ref 7–19)
AST SERPL-CCNC: 35 U/L (ref 5–32)
BACTERIA #/AREA URNS HPF: ABNORMAL /HPF
BARBITURATES UR QL SCN: NEGATIVE
BASE EXCESS ARTERIAL: 3.7 MMOL/L (ref -2–2)
BASOPHILS # BLD: 0 K/UL (ref 0–0.2)
BASOPHILS NFR BLD: 0.9 % (ref 0–1)
BENZODIAZ UR QL SCN: NEGATIVE
BILIRUB SERPL-MCNC: 2.8 MG/DL (ref 0.2–1.2)
BILIRUB UR QL STRIP: ABNORMAL
BUN SERPL-MCNC: 13 MG/DL (ref 8–23)
BUPRENORPHINE URINE: NEGATIVE
CALCIUM SERPL-MCNC: 8.8 MG/DL (ref 8.8–10.2)
CANNABINOIDS UR QL SCN: NEGATIVE
CARBOXYHEMOGLOBIN ARTERIAL: 3.1 % (ref 0–5)
CHLORIDE SERPL-SCNC: 103 MMOL/L (ref 98–111)
CLARITY UR: ABNORMAL
CO2 SERPL-SCNC: 27 MMOL/L (ref 22–29)
COCAINE UR QL SCN: NEGATIVE
COLOR UR: ABNORMAL
CREAT SERPL-MCNC: 0.9 MG/DL (ref 0.5–0.9)
DRUG SCREEN COMMENT UR-IMP: NORMAL
EOSINOPHIL # BLD: 0.2 K/UL (ref 0–0.6)
EOSINOPHIL NFR BLD: 3.9 % (ref 0–5)
ERYTHROCYTE [DISTWIDTH] IN BLOOD BY AUTOMATED COUNT: 16.7 % (ref 11.5–14.5)
FENTANYL SCREEN, URINE: NEGATIVE
GLUCOSE BLD-MCNC: 189 MG/DL (ref 70–99)
GLUCOSE SERPL-MCNC: 136 MG/DL (ref 70–99)
GLUCOSE UR STRIP.AUTO-MCNC: NEGATIVE MG/DL
HCO3 ARTERIAL: 27.7 MMOL/L (ref 22–26)
HCT VFR BLD AUTO: 35.8 % (ref 37–47)
HEMOGLOBIN, ART, EXTENDED: 12.7 G/DL (ref 12–16)
HGB BLD-MCNC: 12.2 G/DL (ref 12–16)
HGB UR STRIP.AUTO-MCNC: NEGATIVE MG/L
IMM GRANULOCYTES # BLD: 0 K/UL
KETONES UR STRIP.AUTO-MCNC: ABNORMAL MG/DL
LEUKOCYTE ESTERASE UR QL STRIP.AUTO: ABNORMAL
LIPASE SERPL-CCNC: 51 U/L (ref 13–60)
LYMPHOCYTES # BLD: 0.8 K/UL (ref 1.1–4.5)
LYMPHOCYTES NFR BLD: 16.7 % (ref 20–40)
MCH RBC QN AUTO: 31.9 PG (ref 27–31)
MCHC RBC AUTO-ENTMCNC: 34.1 G/DL (ref 33–37)
MCV RBC AUTO: 93.5 FL (ref 81–99)
METHADONE UR QL SCN: NEGATIVE
METHAMPHETAMINE, URINE: NEGATIVE
METHEMOGLOBIN ARTERIAL: 1.2 %
MONOCYTES # BLD: 0.4 K/UL (ref 0–0.9)
MONOCYTES NFR BLD: 8.7 % (ref 0–10)
MUCOUS THREADS URNS QL MICRO: ABNORMAL /LPF
NEUTROPHILS # BLD: 3.2 K/UL (ref 1.5–7.5)
NEUTS SEG NFR BLD: 69.4 % (ref 50–65)
NITRITE UR QL STRIP.AUTO: NEGATIVE
O2 CONTENT ARTERIAL: 16.8 ML/DL
O2 DELIVERY DEVICE: ABNORMAL
O2 SAT, ARTERIAL: 93.7 %
O2 THERAPY: ABNORMAL
OPIATES UR QL SCN: NEGATIVE
OXYCODONE UR QL SCN: NEGATIVE
PCO2 ARTERIAL: 39 MMHG (ref 35–45)
PCP UR QL SCN: NEGATIVE
PERFORMED ON: ABNORMAL
PH ARTERIAL: 7.46 (ref 7.35–7.45)
PH UR STRIP.AUTO: 6.5 [PH] (ref 5–8)
PLATELET # BLD AUTO: 66 K/UL (ref 130–400)
PMV BLD AUTO: 10.1 FL (ref 9.4–12.3)
PO2 ARTERIAL: 79 MMHG (ref 80–100)
POTASSIUM BLD-SCNC: 3.3 MMOL/L
POTASSIUM SERPL-SCNC: 3.7 MMOL/L (ref 3.5–5)
PROT SERPL-MCNC: 6.4 G/DL (ref 6.4–8.3)
PROT UR STRIP.AUTO-MCNC: 30 MG/DL
RBC # BLD AUTO: 3.83 M/UL (ref 4.2–5.4)
RBC #/AREA URNS HPF: ABNORMAL /HPF (ref 0–2)
SAMPLE SOURCE: ABNORMAL
SODIUM SERPL-SCNC: 139 MMOL/L (ref 136–145)
SP GR UR STRIP.AUTO: 1.02 (ref 1–1.03)
SQUAMOUS #/AREA URNS HPF: ABNORMAL /HPF
T4 FREE SERPL-MCNC: 0.26 NG/DL (ref 0.93–1.7)
TRICYCLIC ANTIDEPRESSANTS, UR: NEGATIVE
TROPONIN, HIGH SENSITIVITY: 16 NG/L (ref 0–14)
TROPONIN, HIGH SENSITIVITY: 19 NG/L (ref 0–14)
TSH SERPL DL<=0.005 MIU/L-ACNC: 44.86 UIU/ML (ref 0.27–4.2)
UROBILINOGEN UR STRIP.AUTO-MCNC: 4 E.U./DL
WBC # BLD AUTO: 4.6 K/UL (ref 4.8–10.8)
WBC #/AREA URNS HPF: ABNORMAL /HPF (ref 0–5)

## 2025-01-07 PROCEDURE — 81001 URINALYSIS AUTO W/SCOPE: CPT

## 2025-01-07 PROCEDURE — G0480 DRUG TEST DEF 1-7 CLASSES: HCPCS

## 2025-01-07 PROCEDURE — 82803 BLOOD GASES ANY COMBINATION: CPT

## 2025-01-07 PROCEDURE — 93005 ELECTROCARDIOGRAM TRACING: CPT | Performed by: EMERGENCY MEDICINE

## 2025-01-07 PROCEDURE — 36600 WITHDRAWAL OF ARTERIAL BLOOD: CPT

## 2025-01-07 PROCEDURE — 1200000000 HC SEMI PRIVATE

## 2025-01-07 PROCEDURE — 36415 COLL VENOUS BLD VENIPUNCTURE: CPT

## 2025-01-07 PROCEDURE — 6370000000 HC RX 637 (ALT 250 FOR IP): Performed by: NURSE PRACTITIONER

## 2025-01-07 PROCEDURE — 80307 DRUG TEST PRSMV CHEM ANLYZR: CPT

## 2025-01-07 PROCEDURE — 82140 ASSAY OF AMMONIA: CPT

## 2025-01-07 PROCEDURE — 83690 ASSAY OF LIPASE: CPT

## 2025-01-07 PROCEDURE — 71045 X-RAY EXAM CHEST 1 VIEW: CPT

## 2025-01-07 PROCEDURE — 82962 GLUCOSE BLOOD TEST: CPT

## 2025-01-07 PROCEDURE — 84484 ASSAY OF TROPONIN QUANT: CPT

## 2025-01-07 PROCEDURE — 84443 ASSAY THYROID STIM HORMONE: CPT

## 2025-01-07 PROCEDURE — 84439 ASSAY OF FREE THYROXINE: CPT

## 2025-01-07 PROCEDURE — 6370000000 HC RX 637 (ALT 250 FOR IP): Performed by: EMERGENCY MEDICINE

## 2025-01-07 PROCEDURE — 94760 N-INVAS EAR/PLS OXIMETRY 1: CPT

## 2025-01-07 PROCEDURE — 70450 CT HEAD/BRAIN W/O DYE: CPT

## 2025-01-07 PROCEDURE — 99285 EMERGENCY DEPT VISIT HI MDM: CPT

## 2025-01-07 PROCEDURE — 2500000003 HC RX 250 WO HCPCS: Performed by: NURSE PRACTITIONER

## 2025-01-07 PROCEDURE — 85025 COMPLETE CBC W/AUTO DIFF WBC: CPT

## 2025-01-07 PROCEDURE — 80053 COMPREHEN METABOLIC PANEL: CPT

## 2025-01-07 RX ORDER — POLYETHYLENE GLYCOL 3350 17 G/17G
17 POWDER, FOR SOLUTION ORAL DAILY PRN
Status: DISCONTINUED | OUTPATIENT
Start: 2025-01-07 | End: 2025-01-14 | Stop reason: HOSPADM

## 2025-01-07 RX ORDER — LACTULOSE 10 G/15ML
20 SOLUTION ORAL 3 TIMES DAILY
Status: DISCONTINUED | OUTPATIENT
Start: 2025-01-07 | End: 2025-01-14 | Stop reason: HOSPADM

## 2025-01-07 RX ORDER — SODIUM CHLORIDE 0.9 % (FLUSH) 0.9 %
5-40 SYRINGE (ML) INJECTION PRN
Status: DISCONTINUED | OUTPATIENT
Start: 2025-01-07 | End: 2025-01-14 | Stop reason: HOSPADM

## 2025-01-07 RX ORDER — SODIUM CHLORIDE 9 MG/ML
INJECTION, SOLUTION INTRAVENOUS PRN
Status: DISCONTINUED | OUTPATIENT
Start: 2025-01-07 | End: 2025-01-14 | Stop reason: HOSPADM

## 2025-01-07 RX ORDER — LACTULOSE 10 G/15ML
20 SOLUTION ORAL ONCE
Status: COMPLETED | OUTPATIENT
Start: 2025-01-07 | End: 2025-01-07

## 2025-01-07 RX ORDER — ESCITALOPRAM OXALATE 10 MG/1
20 TABLET ORAL DAILY
Status: DISCONTINUED | OUTPATIENT
Start: 2025-01-07 | End: 2025-01-14 | Stop reason: HOSPADM

## 2025-01-07 RX ORDER — POTASSIUM CHLORIDE 7.45 MG/ML
10 INJECTION INTRAVENOUS PRN
Status: DISCONTINUED | OUTPATIENT
Start: 2025-01-07 | End: 2025-01-14 | Stop reason: HOSPADM

## 2025-01-07 RX ORDER — ONDANSETRON 2 MG/ML
4 INJECTION INTRAMUSCULAR; INTRAVENOUS EVERY 6 HOURS PRN
Status: DISCONTINUED | OUTPATIENT
Start: 2025-01-07 | End: 2025-01-14 | Stop reason: HOSPADM

## 2025-01-07 RX ORDER — SODIUM CHLORIDE 0.9 % (FLUSH) 0.9 %
5-40 SYRINGE (ML) INJECTION EVERY 12 HOURS SCHEDULED
Status: DISCONTINUED | OUTPATIENT
Start: 2025-01-07 | End: 2025-01-14 | Stop reason: HOSPADM

## 2025-01-07 RX ORDER — INSULIN LISPRO 100 [IU]/ML
0-4 INJECTION, SOLUTION INTRAVENOUS; SUBCUTANEOUS
Status: DISCONTINUED | OUTPATIENT
Start: 2025-01-07 | End: 2025-01-14 | Stop reason: HOSPADM

## 2025-01-07 RX ORDER — ONDANSETRON 4 MG/1
4 TABLET, ORALLY DISINTEGRATING ORAL EVERY 8 HOURS PRN
Status: DISCONTINUED | OUTPATIENT
Start: 2025-01-07 | End: 2025-01-14 | Stop reason: HOSPADM

## 2025-01-07 RX ORDER — LEVOTHYROXINE SODIUM 100 UG/1
200 TABLET ORAL DAILY
Status: DISCONTINUED | OUTPATIENT
Start: 2025-01-08 | End: 2025-01-14 | Stop reason: HOSPADM

## 2025-01-07 RX ORDER — ENOXAPARIN SODIUM 100 MG/ML
30 INJECTION SUBCUTANEOUS DAILY
Status: DISCONTINUED | OUTPATIENT
Start: 2025-01-07 | End: 2025-01-07

## 2025-01-07 RX ORDER — POTASSIUM CHLORIDE 1500 MG/1
40 TABLET, EXTENDED RELEASE ORAL PRN
Status: DISCONTINUED | OUTPATIENT
Start: 2025-01-07 | End: 2025-01-14 | Stop reason: HOSPADM

## 2025-01-07 RX ORDER — MAGNESIUM SULFATE IN WATER 40 MG/ML
2000 INJECTION, SOLUTION INTRAVENOUS PRN
Status: DISCONTINUED | OUTPATIENT
Start: 2025-01-07 | End: 2025-01-14 | Stop reason: HOSPADM

## 2025-01-07 RX ADMIN — LACTULOSE 20 G: 20 SOLUTION ORAL at 22:07

## 2025-01-07 RX ADMIN — LACTULOSE 20 G: 20 SOLUTION ORAL at 14:36

## 2025-01-07 RX ADMIN — INSULIN LISPRO 1 UNITS: 100 INJECTION, SOLUTION INTRAVENOUS; SUBCUTANEOUS at 22:49

## 2025-01-07 RX ADMIN — ESCITALOPRAM OXALATE 20 MG: 10 TABLET ORAL at 22:08

## 2025-01-07 RX ADMIN — SODIUM CHLORIDE, PRESERVATIVE FREE 10 ML: 5 INJECTION INTRAVENOUS at 22:08

## 2025-01-07 ASSESSMENT — PAIN - FUNCTIONAL ASSESSMENT: PAIN_FUNCTIONAL_ASSESSMENT: NONE - DENIES PAIN

## 2025-01-07 NOTE — ED NOTES
ED TO INPATIENT SBAR HANDOFF    Patient Name: Yuli Duran   : 1945  79 y.o.   Family/Caregiver Present: Yes  Code Status Order: No Order    C-SSRS:    Sitter No  Restraints:         Situation  Chief Complaint:   Chief Complaint   Patient presents with    Altered Mental Status     X2-3 days family state she has been dealing with leg swelling as well     Patient Diagnosis: Hepatic encephalopathy (HCC) [K76.82]     Brief Description of Patient's Condition: Pt brought to ER by family due to increased confusion.  Per family, pt has been confused at baseline for past few months, but over the past few days it has gotten worse.    Pt knew name, but did not know , or current year.   Pt does know place and family with her.     Pt able to get up to b/s toilet with minimal assistance.   Pt given lactulose in ER.   Mental Status: alert, coherent, logical, thought processes intact, and able to concentrate and follow conversation  Arrived from: home    Imaging:   XR CHEST PORTABLE   Final Result   1.  No acute disease.               ______________________________________    Electronically signed by: NISA BURKS D.O.   Date:     2025   Time:    14:40       CT HEAD WO CONTRAST   Final Result   No acute intracranial abnormality is detected   Mild sinusitis        All CT scans are performed using dose optimization techniques as appropriate to the performed exam and include    at least one of the following: Automated exposure control, adjustment of the mA and/or kV according to size, and the use of iterative reconstruction technique.        ______________________________________    Electronically signed by: TYRONE TINAJERO M.D.   Date:     2025   Time:    14:17         COVID-19 Results:   Internal Administration   First Dose COVID-19, PFIZER PURPLE top, DILUTE for use, (age 12 y+), 30mcg/0.3mL  2021   Second Dose COVID-19, PFIZER PURPLE top, DILUTE for use, (age 12 y+), 30mcg/0.3mL   2021

## 2025-01-07 NOTE — ED PROVIDER NOTES
Abnormal; Notable for the following components:    TSH 44.860 (*)     All other components within normal limits   T4, FREE - Abnormal; Notable for the following components:    T4 Free 0.26 (*)     All other components within normal limits   CBC WITH AUTO DIFFERENTIAL - Abnormal; Notable for the following components:    WBC 4.6 (*)     RBC 3.83 (*)     Hematocrit 35.8 (*)     MCH 31.9 (*)     RDW 16.7 (*)     Platelets 66 (*)     Neutrophils % 69.4 (*)     Lymphocytes % 16.7 (*)     Lymphocytes Absolute 0.8 (*)     All other components within normal limits   COMPREHENSIVE METABOLIC PANEL W/ REFLEX TO MG FOR LOW K - Abnormal; Notable for the following components:    Glucose 136 (*)     Albumin 3.2 (*)     Total Bilirubin 2.8 (*)     AST 35 (*)     All other components within normal limits   AMMONIA - Abnormal; Notable for the following components:    Ammonia 89 (*)     All other components within normal limits   URINALYSIS WITH REFLEX TO CULTURE - Abnormal; Notable for the following components:    Color, UA DARK YELLOW (*)     Clarity, UA CLOUDY (*)     Bilirubin, Urine SMALL (*)     Ketones, Urine TRACE (*)     Protein, UA 30 (*)     Urobilinogen, Urine 4.0 (*)     Leukocyte Esterase, Urine MODERATE (*)     All other components within normal limits   TROPONIN - Abnormal; Notable for the following components:    Troponin, High Sensitivity 19 (*)     All other components within normal limits   MICROSCOPIC URINALYSIS - Abnormal; Notable for the following components:    Mucus, UA Rare (*)     All other components within normal limits   LIPASE   DRUG SCRN, BUPRENORPHINE   TROPONIN   URINALYSIS WITH REFLEX TO CULTURE       All other labs were within normal range or not returned as of this dictation.    EMERGENCY DEPARTMENT COURSE and DIFFERENTIALDIAGNOSIS/MDM:   Vitals:    Vitals:    01/07/25 1249 01/07/25 1347 01/07/25 1350 01/07/25 1353   BP: (!) 167/67 (!) 165/65     Pulse: 72 75 74 73   Resp: 18 13 14 14   Temp: 98.8

## 2025-01-07 NOTE — H&P
[facility]      Hospitalist - History & Physical      PCP: Dereje John MD    Date of Admission: 1/7/2025    Date of Service: 1/7/2025    Chief Complaint:  AMS    History Of Present Illness:   The patient is a 79 y.o. female with a PMH of type 2 diabetes, Bernabe, insomnia splenomegaly, HTN and vitamin D deficiency who presented to [facility] ED on 1/7/25 complaining of AMS. Family is at bedside assisting his hx, as she is slow to respond. Family states that she she began to become increasingly altered on the day prior to admission.  The patient states that she feels \"spacey\".  Her daughter states that she has noticed hand fluttering consistent with asterixis.  She does report constipation.  Her daughter states that she has noticed that the patient has not required a pickup of her levothyroxine as often.  She has known Bernabe, however, she denies a confirmed history of cirrhosis.  She has chronic edema to bilateral lower extremities.  Denied fever, chills, unilateral weakness, chest pain or palpitations.  Further ED workup revealed normal electrolytes.  Ammonia 89, AST 35, bilirubin 2.8.  TSH 44.860 and T4-0.26.  UDS negative.  WBC 4.6, H&H 12.2/35.8 with a platelet count of 66.  UA moderate leukocytes, however, negative WBC.  CT of the head no acute intercranial abnormality.  Chest x-ray no acute disease.  She is on room air and in no acute distress.  The patient will be admitted to hospital medicine for altered mental status, hypothyroidism, and hepatic encephalopathy.    Past Medical History:        Diagnosis Date    Anxiety     Bruising     Diabetes mellitus (HCC)     Edema     Fracture of nasal bone     History of cellulitis     History of constipation     medication induced    Hypertension     Non-alcoholic cirrhosis (HCC)     SDH (subdural hematoma) 10/30/2021    small, post fall ([facility]    Sleep disorder     Splenomegaly     Thyroid disease        Past Surgical History:

## 2025-01-08 ENCOUNTER — APPOINTMENT (OUTPATIENT)
Dept: ULTRASOUND IMAGING | Age: 80
DRG: 442 | End: 2025-01-08
Payer: MEDICARE

## 2025-01-08 LAB
ALBUMIN SERPL-MCNC: 2.9 G/DL (ref 3.5–5.2)
ALP SERPL-CCNC: 93 U/L (ref 35–104)
ALT SERPL-CCNC: 16 U/L (ref 5–33)
ANION GAP SERPL CALCULATED.3IONS-SCNC: 9 MMOL/L (ref 7–19)
AST SERPL-CCNC: 37 U/L (ref 5–32)
BASOPHILS # BLD: 0.1 K/UL (ref 0–0.2)
BASOPHILS NFR BLD: 1 % (ref 0–1)
BILIRUB SERPL-MCNC: 2.1 MG/DL (ref 0.2–1.2)
BUN SERPL-MCNC: 13 MG/DL (ref 8–23)
CALCIUM SERPL-MCNC: 8.2 MG/DL (ref 8.8–10.2)
CHLORIDE SERPL-SCNC: 105 MMOL/L (ref 98–111)
CO2 SERPL-SCNC: 26 MMOL/L (ref 22–29)
CREAT SERPL-MCNC: 0.8 MG/DL (ref 0.5–0.9)
EKG P AXIS: 7 DEGREES
EKG P-R INTERVAL: 176 MS
EKG Q-T INTERVAL: 418 MS
EKG QRS DURATION: 82 MS
EKG QTC CALCULATION (BAZETT): 442 MS
EKG T AXIS: -177 DEGREES
EOSINOPHIL # BLD: 0.3 K/UL (ref 0–0.6)
EOSINOPHIL NFR BLD: 5.3 % (ref 0–5)
ERYTHROCYTE [DISTWIDTH] IN BLOOD BY AUTOMATED COUNT: 16.7 % (ref 11.5–14.5)
GLUCOSE BLD-MCNC: 101 MG/DL (ref 70–99)
GLUCOSE BLD-MCNC: 126 MG/DL (ref 70–99)
GLUCOSE BLD-MCNC: 157 MG/DL (ref 70–99)
GLUCOSE BLD-MCNC: 164 MG/DL (ref 70–99)
GLUCOSE SERPL-MCNC: 94 MG/DL (ref 70–99)
HAV IGM SERPL QL IA: NORMAL
HBV CORE IGM SERPL QL IA: NORMAL
HBV SURFACE AG SERPL QL IA: NORMAL
HCT VFR BLD AUTO: 34 % (ref 37–47)
HCV AB SERPL QL IA: NORMAL
HGB BLD-MCNC: 11.4 G/DL (ref 12–16)
IMM GRANULOCYTES # BLD: 0 K/UL
INR PPP: 1.46 (ref 0.88–1.18)
LYMPHOCYTES # BLD: 1.2 K/UL (ref 1.1–4.5)
LYMPHOCYTES NFR BLD: 23.9 % (ref 20–40)
MAGNESIUM SERPL-MCNC: 1.8 MG/DL (ref 1.6–2.4)
MCH RBC QN AUTO: 32 PG (ref 27–31)
MCHC RBC AUTO-ENTMCNC: 33.5 G/DL (ref 33–37)
MCV RBC AUTO: 95.5 FL (ref 81–99)
MONOCYTES # BLD: 0.5 K/UL (ref 0–0.9)
MONOCYTES NFR BLD: 10.1 % (ref 0–10)
NEUTROPHILS # BLD: 3 K/UL (ref 1.5–7.5)
NEUTS SEG NFR BLD: 59.1 % (ref 50–65)
PERFORMED ON: ABNORMAL
PLATELET # BLD AUTO: 60 K/UL (ref 130–400)
PMV BLD AUTO: 10.2 FL (ref 9.4–12.3)
POTASSIUM SERPL-SCNC: 3.4 MMOL/L (ref 3.5–5)
PROT SERPL-MCNC: 5.8 G/DL (ref 6.4–8.3)
PROTHROMBIN TIME: 17.4 SEC (ref 12–14.6)
RBC # BLD AUTO: 3.56 M/UL (ref 4.2–5.4)
SODIUM SERPL-SCNC: 140 MMOL/L (ref 136–145)
WBC # BLD AUTO: 5.1 K/UL (ref 4.8–10.8)

## 2025-01-08 PROCEDURE — 80053 COMPREHEN METABOLIC PANEL: CPT

## 2025-01-08 PROCEDURE — 80074 ACUTE HEPATITIS PANEL: CPT

## 2025-01-08 PROCEDURE — 36415 COLL VENOUS BLD VENIPUNCTURE: CPT

## 2025-01-08 PROCEDURE — 85610 PROTHROMBIN TIME: CPT

## 2025-01-08 PROCEDURE — 2500000003 HC RX 250 WO HCPCS: Performed by: NURSE PRACTITIONER

## 2025-01-08 PROCEDURE — 85025 COMPLETE CBC W/AUTO DIFF WBC: CPT

## 2025-01-08 PROCEDURE — 99223 1ST HOSP IP/OBS HIGH 75: CPT | Performed by: INTERNAL MEDICINE

## 2025-01-08 PROCEDURE — 83735 ASSAY OF MAGNESIUM: CPT

## 2025-01-08 PROCEDURE — 6370000000 HC RX 637 (ALT 250 FOR IP): Performed by: NURSE PRACTITIONER

## 2025-01-08 PROCEDURE — 87086 URINE CULTURE/COLONY COUNT: CPT

## 2025-01-08 PROCEDURE — 82962 GLUCOSE BLOOD TEST: CPT

## 2025-01-08 PROCEDURE — 1200000000 HC SEMI PRIVATE

## 2025-01-08 PROCEDURE — 76705 ECHO EXAM OF ABDOMEN: CPT

## 2025-01-08 PROCEDURE — 93010 ELECTROCARDIOGRAM REPORT: CPT | Performed by: INTERNAL MEDICINE

## 2025-01-08 RX ADMIN — SODIUM CHLORIDE, PRESERVATIVE FREE 10 ML: 5 INJECTION INTRAVENOUS at 07:45

## 2025-01-08 RX ADMIN — LACTULOSE 20 G: 20 SOLUTION ORAL at 09:14

## 2025-01-08 RX ADMIN — SODIUM CHLORIDE, PRESERVATIVE FREE 10 ML: 5 INJECTION INTRAVENOUS at 20:04

## 2025-01-08 RX ADMIN — POTASSIUM CHLORIDE 40 MEQ: 1500 TABLET, EXTENDED RELEASE ORAL at 14:34

## 2025-01-08 RX ADMIN — LACTULOSE 20 G: 20 SOLUTION ORAL at 20:02

## 2025-01-08 RX ADMIN — ESCITALOPRAM OXALATE 20 MG: 10 TABLET ORAL at 09:14

## 2025-01-08 RX ADMIN — LEVOTHYROXINE SODIUM 200 MCG: 100 TABLET ORAL at 09:14

## 2025-01-08 RX ADMIN — LACTULOSE 20 G: 20 SOLUTION ORAL at 14:34

## 2025-01-08 NOTE — CONSULTS
01/08/25  0548   WBC 4.6* 5.1   RBC 3.83* 3.56*   HGB 12.2 11.4*   HCT 35.8* 34.0*   MCV 93.5 95.5   RDW 16.7* 16.7*   PLT 66* 60*     CHEMISTRIES:  Recent Labs     01/07/25  1330 01/07/25  1332 01/08/25  0548     --  140   K 3.7 3.3 3.4*     --  105   CO2 27  --  26   BUN 13  --  13   CREATININE 0.9  --  0.8   GLUCOSE 136*  --  94   MG  --   --  1.8     PT/INR:  Recent Labs     01/08/25  0548   PROTIME 17.4*   INR 1.46*     APTT:No results for input(s): \"APTT\" in the last 72 hours.  LIVER PROFILE:  Recent Labs     01/07/25  1330 01/08/25  0548   AST 35* 37*   ALT 17 16   BILITOT 2.8* 2.1*   ALKPHOS 92 93       Imaging/Diagnostics   Pertinent studies mentioned above.    Assessment & Plan:   This is a 79-year-old female with obesity (BMI 40) with a remote history of reported MILLER (no cirrhosis) and hypothyroidism with diabetes mellitus type 2 who presents with altered mental status yesterday.  Given her history of Miller cirrhosis with altered mental status and elevated ammonia, we have been asked to further evaluate for acute decompensated chronic liver disease and hepatic encephalopathy.    Abnormal liver enzymes, obstructive pattern in the setting of altered mental status with elevated ammonia level.  She has a history of Miller but no cirrhosis.  However, now, her labs are showing some signs of potential portal hypertension and possible cirrhosis given the low albumin and platelet count.  And also the splenomegaly seen on imaging.  Additionally her ammonia level is elevated.  Normal creatinine.  No ascites on imaging.  She does have evidence of lower extremity pitting edema/volume overload.  Asterixis on my exam.  To have a better idea of definitive diagnosis of cirrhosis in this patient, she will need to become established in GI clinic and undergo further studies such as elastography or FibroScan to determine whether there is cirrhosis.  Please arrange upon discharge.  Complete infectious workup with a

## 2025-01-09 ENCOUNTER — APPOINTMENT (OUTPATIENT)
Age: 80
DRG: 442 | End: 2025-01-09
Attending: INTERNAL MEDICINE
Payer: MEDICARE

## 2025-01-09 LAB
ALBUMIN SERPL-MCNC: 2.8 G/DL (ref 3.5–5.2)
ALP SERPL-CCNC: 91 U/L (ref 35–104)
ALT SERPL-CCNC: 15 U/L (ref 5–33)
AMMONIA PLAS-SCNC: 101 UMOL/L (ref 11–51)
ANION GAP SERPL CALCULATED.3IONS-SCNC: 10 MMOL/L (ref 7–19)
AST SERPL-CCNC: 37 U/L (ref 5–32)
BASOPHILS # BLD: 0 K/UL (ref 0–0.2)
BASOPHILS NFR BLD: 0.6 % (ref 0–1)
BILIRUB SERPL-MCNC: 2.1 MG/DL (ref 0.2–1.2)
BUN SERPL-MCNC: 12 MG/DL (ref 8–23)
CALCIUM SERPL-MCNC: 7.8 MG/DL (ref 8.8–10.2)
CHLORIDE SERPL-SCNC: 104 MMOL/L (ref 98–111)
CO2 SERPL-SCNC: 24 MMOL/L (ref 22–29)
CREAT SERPL-MCNC: 0.8 MG/DL (ref 0.5–0.9)
ECHO AO ASC DIAM: 2.9 CM
ECHO AO ASCENDING AORTA INDEX: 1.35 CM/M2
ECHO AO ROOT DIAM: 2.2 CM
ECHO AO ROOT INDEX: 1.02 CM/M2
ECHO AO SINUS VALSALVA DIAM: 2.7 CM
ECHO AO SINUS VALSALVA INDEX: 1.26 CM/M2
ECHO AO ST JNCT DIAM: 2.1 CM
ECHO AV AREA PEAK VELOCITY: 1.9 CM2
ECHO AV AREA VTI: 2.2 CM2
ECHO AV AREA/BSA PEAK VELOCITY: 0.9 CM2/M2
ECHO AV AREA/BSA VTI: 1 CM2/M2
ECHO AV MEAN GRADIENT: 6 MMHG
ECHO AV MEAN VELOCITY: 1.1 M/S
ECHO AV PEAK GRADIENT: 11 MMHG
ECHO AV PEAK VELOCITY: 1.7 M/S
ECHO AV VELOCITY RATIO: 0.59
ECHO AV VTI: 38.9 CM
ECHO BSA: 2.24 M2
ECHO LA AREA 2C: 23 CM2
ECHO LA AREA 4C: 22 CM2
ECHO LA DIAMETER INDEX: 2.14 CM/M2
ECHO LA DIAMETER: 4.6 CM
ECHO LA MAJOR AXIS: 6.4 CM
ECHO LA MINOR AXIS: 6 CM
ECHO LA TO AORTIC ROOT RATIO: 2.09
ECHO LA VOL BP: 68 ML (ref 22–52)
ECHO LA VOL MOD A2C: 70 ML (ref 22–52)
ECHO LA VOL MOD A4C: 62 ML (ref 22–52)
ECHO LA VOL/BSA BIPLANE: 32 ML/M2 (ref 16–34)
ECHO LA VOLUME INDEX MOD A2C: 33 ML/M2 (ref 16–34)
ECHO LA VOLUME INDEX MOD A4C: 29 ML/M2 (ref 16–34)
ECHO LV E' LATERAL VELOCITY: 6.31 CM/S
ECHO LV E' SEPTAL VELOCITY: 5.33 CM/S
ECHO LV EDV A2C: 100 ML
ECHO LV EDV A4C: 143 ML
ECHO LV EDV INDEX A4C: 67 ML/M2
ECHO LV EDV NDEX A2C: 47 ML/M2
ECHO LV EJECTION FRACTION A2C: 57 %
ECHO LV EJECTION FRACTION A4C: 65 %
ECHO LV EJECTION FRACTION BIPLANE: 63 % (ref 55–100)
ECHO LV ESV A2C: 43 ML
ECHO LV ESV A4C: 51 ML
ECHO LV ESV INDEX A2C: 20 ML/M2
ECHO LV ESV INDEX A4C: 24 ML/M2
ECHO LV FRACTIONAL SHORTENING: 37 % (ref 28–44)
ECHO LV INTERNAL DIMENSION DIASTOLE INDEX: 2.51 CM/M2
ECHO LV INTERNAL DIMENSION DIASTOLIC: 5.4 CM (ref 3.9–5.3)
ECHO LV INTERNAL DIMENSION SYSTOLIC INDEX: 1.58 CM/M2
ECHO LV INTERNAL DIMENSION SYSTOLIC: 3.4 CM
ECHO LV ISOVOLUMETRIC RELAXATION TIME (IVRT): 74 MS
ECHO LV IVSD: 1.3 CM (ref 0.6–0.9)
ECHO LV MASS 2D: 279.8 G (ref 67–162)
ECHO LV MASS INDEX 2D: 130.1 G/M2 (ref 43–95)
ECHO LV POSTERIOR WALL DIASTOLIC: 1.2 CM (ref 0.6–0.9)
ECHO LV RELATIVE WALL THICKNESS RATIO: 0.44
ECHO LVOT AREA: 3.1 CM2
ECHO LVOT AV VTI INDEX: 0.71
ECHO LVOT DIAM: 2 CM
ECHO LVOT MEAN GRADIENT: 2 MMHG
ECHO LVOT PEAK GRADIENT: 4 MMHG
ECHO LVOT PEAK VELOCITY: 1 M/S
ECHO LVOT STROKE VOLUME INDEX: 40.3 ML/M2
ECHO LVOT SV: 86.7 ML
ECHO LVOT VTI: 27.6 CM
ECHO MV A VELOCITY: 0.92 M/S
ECHO MV AREA VTI: 2.2 CM2
ECHO MV E DECELERATION TIME (DT): 299 MS
ECHO MV E VELOCITY: 1.1 M/S
ECHO MV E/A RATIO: 1.2
ECHO MV E/E' LATERAL: 17.43
ECHO MV E/E' RATIO (AVERAGED): 19.04
ECHO MV E/E' SEPTAL: 20.64
ECHO MV LVOT VTI INDEX: 1.42
ECHO MV MAX VELOCITY: 1 M/S
ECHO MV MEAN GRADIENT: 3 MMHG
ECHO MV MEAN VELOCITY: 0.8 M/S
ECHO MV PEAK GRADIENT: 4 MMHG
ECHO MV VTI: 39.2 CM
ECHO RA AREA 4C: 16 CM2
ECHO RA END SYSTOLIC VOLUME APICAL 4 CHAMBER INDEX BSA: 19 ML/M2
ECHO RA VOLUME: 41 ML
ECHO RV BASAL DIMENSION: 3.2 CM
ECHO RV LONGITUDINAL DIMENSION: 8 CM
ECHO RV MID DIMENSION: 2.4 CM
ECHO RV TAPSE: 2 CM (ref 1.7–?)
ECHO TV REGURGITANT MAX VELOCITY: 3.28 M/S
ECHO TV REGURGITANT PEAK GRADIENT: 40 MMHG
EOSINOPHIL # BLD: 0.3 K/UL (ref 0–0.6)
EOSINOPHIL NFR BLD: 5.9 % (ref 0–5)
ERYTHROCYTE [DISTWIDTH] IN BLOOD BY AUTOMATED COUNT: 17.2 % (ref 11.5–14.5)
GLUCOSE BLD-MCNC: 160 MG/DL (ref 70–99)
GLUCOSE BLD-MCNC: 171 MG/DL (ref 70–99)
GLUCOSE BLD-MCNC: 182 MG/DL (ref 70–99)
GLUCOSE BLD-MCNC: 91 MG/DL (ref 70–99)
GLUCOSE SERPL-MCNC: 102 MG/DL (ref 70–99)
HBA1C MFR BLD: 5.3 % (ref 4–5.6)
HCT VFR BLD AUTO: 33.5 % (ref 37–47)
HGB BLD-MCNC: 11.2 G/DL (ref 12–16)
IMM GRANULOCYTES # BLD: 0 K/UL
INR PPP: 1.28 (ref 0.88–1.18)
LYMPHOCYTES # BLD: 1.5 K/UL (ref 1.1–4.5)
LYMPHOCYTES NFR BLD: 27.4 % (ref 20–40)
MCH RBC QN AUTO: 31.8 PG (ref 27–31)
MCHC RBC AUTO-ENTMCNC: 33.4 G/DL (ref 33–37)
MCV RBC AUTO: 95.2 FL (ref 81–99)
MONOCYTES # BLD: 0.4 K/UL (ref 0–0.9)
MONOCYTES NFR BLD: 7.5 % (ref 0–10)
NEUTROPHILS # BLD: 3.2 K/UL (ref 1.5–7.5)
NEUTS SEG NFR BLD: 58.2 % (ref 50–65)
PERFORMED ON: ABNORMAL
PERFORMED ON: NORMAL
PLATELET # BLD AUTO: 60 K/UL (ref 130–400)
PMV BLD AUTO: 9.4 FL (ref 9.4–12.3)
POTASSIUM SERPL-SCNC: 3.7 MMOL/L (ref 3.5–5)
PROT SERPL-MCNC: 5.7 G/DL (ref 6.4–8.3)
PROTHROMBIN TIME: 15.6 SEC (ref 12–14.6)
RBC # BLD AUTO: 3.52 M/UL (ref 4.2–5.4)
SODIUM SERPL-SCNC: 138 MMOL/L (ref 136–145)
WBC # BLD AUTO: 5.4 K/UL (ref 4.8–10.8)

## 2025-01-09 PROCEDURE — 6360000004 HC RX CONTRAST MEDICATION: Performed by: INTERNAL MEDICINE

## 2025-01-09 PROCEDURE — 82962 GLUCOSE BLOOD TEST: CPT

## 2025-01-09 PROCEDURE — C8929 TTE W OR WO FOL WCON,DOPPLER: HCPCS

## 2025-01-09 PROCEDURE — 2500000003 HC RX 250 WO HCPCS: Performed by: NURSE PRACTITIONER

## 2025-01-09 PROCEDURE — 1200000000 HC SEMI PRIVATE

## 2025-01-09 PROCEDURE — 82140 ASSAY OF AMMONIA: CPT

## 2025-01-09 PROCEDURE — 36415 COLL VENOUS BLD VENIPUNCTURE: CPT

## 2025-01-09 PROCEDURE — 85025 COMPLETE CBC W/AUTO DIFF WBC: CPT

## 2025-01-09 PROCEDURE — 80053 COMPREHEN METABOLIC PANEL: CPT

## 2025-01-09 PROCEDURE — 85610 PROTHROMBIN TIME: CPT

## 2025-01-09 PROCEDURE — 83036 HEMOGLOBIN GLYCOSYLATED A1C: CPT

## 2025-01-09 PROCEDURE — 99233 SBSQ HOSP IP/OBS HIGH 50: CPT | Performed by: INTERNAL MEDICINE

## 2025-01-09 PROCEDURE — 6370000000 HC RX 637 (ALT 250 FOR IP): Performed by: NURSE PRACTITIONER

## 2025-01-09 PROCEDURE — 93306 TTE W/DOPPLER COMPLETE: CPT | Performed by: INTERNAL MEDICINE

## 2025-01-09 RX ADMIN — LACTULOSE 20 G: 20 SOLUTION ORAL at 21:03

## 2025-01-09 RX ADMIN — LACTULOSE 20 G: 20 SOLUTION ORAL at 13:46

## 2025-01-09 RX ADMIN — SULFUR HEXAFLUORIDE 2 ML: 60.7; .19; .19 INJECTION, POWDER, LYOPHILIZED, FOR SUSPENSION INTRAVENOUS; INTRAVESICAL at 09:25

## 2025-01-09 RX ADMIN — ESCITALOPRAM OXALATE 20 MG: 10 TABLET ORAL at 07:20

## 2025-01-09 RX ADMIN — SODIUM CHLORIDE, PRESERVATIVE FREE 10 ML: 5 INJECTION INTRAVENOUS at 07:20

## 2025-01-09 RX ADMIN — LACTULOSE 20 G: 20 SOLUTION ORAL at 07:20

## 2025-01-09 RX ADMIN — LEVOTHYROXINE SODIUM 200 MCG: 100 TABLET ORAL at 05:34

## 2025-01-10 LAB
ALBUMIN SERPL-MCNC: 2.9 G/DL (ref 3.5–5.2)
ALP SERPL-CCNC: 89 U/L (ref 35–104)
ALT SERPL-CCNC: 16 U/L (ref 5–33)
ANION GAP SERPL CALCULATED.3IONS-SCNC: 10 MMOL/L (ref 7–19)
AST SERPL-CCNC: 37 U/L (ref 5–32)
BACTERIA UR CULT: NORMAL
BASOPHILS # BLD: 0 K/UL (ref 0–0.2)
BASOPHILS NFR BLD: 0.5 % (ref 0–1)
BILIRUB SERPL-MCNC: 2.3 MG/DL (ref 0.2–1.2)
BUN SERPL-MCNC: 14 MG/DL (ref 8–23)
CALCIUM SERPL-MCNC: 8 MG/DL (ref 8.8–10.2)
CHLORIDE SERPL-SCNC: 105 MMOL/L (ref 98–111)
CO2 SERPL-SCNC: 24 MMOL/L (ref 22–29)
CREAT SERPL-MCNC: 0.6 MG/DL (ref 0.5–0.9)
EOSINOPHIL # BLD: 0.3 K/UL (ref 0–0.6)
EOSINOPHIL NFR BLD: 3.5 % (ref 0–5)
ERYTHROCYTE [DISTWIDTH] IN BLOOD BY AUTOMATED COUNT: 17.3 % (ref 11.5–14.5)
GLUCOSE BLD-MCNC: 119 MG/DL (ref 70–99)
GLUCOSE BLD-MCNC: 119 MG/DL (ref 70–99)
GLUCOSE BLD-MCNC: 200 MG/DL (ref 70–99)
GLUCOSE BLD-MCNC: 213 MG/DL (ref 70–99)
GLUCOSE SERPL-MCNC: 129 MG/DL (ref 70–99)
HCT VFR BLD AUTO: 37.5 % (ref 37–47)
HGB BLD-MCNC: 12.4 G/DL (ref 12–16)
IGG SERPL-MCNC: 1727 MG/DL (ref 700–1600)
IMM GRANULOCYTES # BLD: 0 K/UL
LYMPHOCYTES # BLD: 1.4 K/UL (ref 1.1–4.5)
LYMPHOCYTES NFR BLD: 17.4 % (ref 20–40)
MCH RBC QN AUTO: 32.4 PG (ref 27–31)
MCHC RBC AUTO-ENTMCNC: 33.1 G/DL (ref 33–37)
MCV RBC AUTO: 97.9 FL (ref 81–99)
MONOCYTES # BLD: 0.5 K/UL (ref 0–0.9)
MONOCYTES NFR BLD: 5.8 % (ref 0–10)
NEUTROPHILS # BLD: 5.6 K/UL (ref 1.5–7.5)
NEUTS SEG NFR BLD: 72.4 % (ref 50–65)
PERFORMED ON: ABNORMAL
PLATELET # BLD AUTO: 60 K/UL (ref 130–400)
PMV BLD AUTO: 10.2 FL (ref 9.4–12.3)
POTASSIUM SERPL-SCNC: 3.7 MMOL/L (ref 3.5–5)
PROT SERPL-MCNC: 5.8 G/DL (ref 6.4–8.3)
RBC # BLD AUTO: 3.83 M/UL (ref 4.2–5.4)
SODIUM SERPL-SCNC: 139 MMOL/L (ref 136–145)
WBC # BLD AUTO: 7.8 K/UL (ref 4.8–10.8)

## 2025-01-10 PROCEDURE — 99232 SBSQ HOSP IP/OBS MODERATE 35: CPT | Performed by: INTERNAL MEDICINE

## 2025-01-10 PROCEDURE — 85025 COMPLETE CBC W/AUTO DIFF WBC: CPT

## 2025-01-10 PROCEDURE — 86256 FLUORESCENT ANTIBODY TITER: CPT

## 2025-01-10 PROCEDURE — 6370000000 HC RX 637 (ALT 250 FOR IP): Performed by: NURSE PRACTITIONER

## 2025-01-10 PROCEDURE — 82784 ASSAY IGA/IGD/IGG/IGM EACH: CPT

## 2025-01-10 PROCEDURE — 82962 GLUCOSE BLOOD TEST: CPT

## 2025-01-10 PROCEDURE — 83516 IMMUNOASSAY NONANTIBODY: CPT

## 2025-01-10 PROCEDURE — 86376 MICROSOMAL ANTIBODY EACH: CPT

## 2025-01-10 PROCEDURE — 1200000000 HC SEMI PRIVATE

## 2025-01-10 PROCEDURE — 86038 ANTINUCLEAR ANTIBODIES: CPT

## 2025-01-10 PROCEDURE — 2500000003 HC RX 250 WO HCPCS: Performed by: NURSE PRACTITIONER

## 2025-01-10 PROCEDURE — 36415 COLL VENOUS BLD VENIPUNCTURE: CPT

## 2025-01-10 PROCEDURE — 80053 COMPREHEN METABOLIC PANEL: CPT

## 2025-01-10 RX ADMIN — SODIUM CHLORIDE, PRESERVATIVE FREE 10 ML: 5 INJECTION INTRAVENOUS at 08:19

## 2025-01-10 RX ADMIN — ESCITALOPRAM OXALATE 20 MG: 10 TABLET ORAL at 08:19

## 2025-01-10 RX ADMIN — INSULIN LISPRO 1 UNITS: 100 INJECTION, SOLUTION INTRAVENOUS; SUBCUTANEOUS at 13:34

## 2025-01-10 RX ADMIN — LACTULOSE 20 G: 20 SOLUTION ORAL at 08:19

## 2025-01-10 RX ADMIN — LACTULOSE 20 G: 20 SOLUTION ORAL at 20:50

## 2025-01-10 RX ADMIN — LEVOTHYROXINE SODIUM 200 MCG: 100 TABLET ORAL at 06:17

## 2025-01-10 RX ADMIN — SODIUM CHLORIDE, PRESERVATIVE FREE 10 ML: 5 INJECTION INTRAVENOUS at 20:50

## 2025-01-10 RX ADMIN — LACTULOSE 20 G: 20 SOLUTION ORAL at 13:35

## 2025-01-11 LAB
ALBUMIN SERPL-MCNC: 2.7 G/DL (ref 3.5–5.2)
ALP SERPL-CCNC: 86 U/L (ref 35–104)
ALT SERPL-CCNC: 14 U/L (ref 5–33)
AMMONIA PLAS-SCNC: 100 UMOL/L (ref 11–51)
ANION GAP SERPL CALCULATED.3IONS-SCNC: 8 MMOL/L (ref 7–19)
AST SERPL-CCNC: 33 U/L (ref 5–32)
BASOPHILS # BLD: 0 K/UL (ref 0–0.2)
BASOPHILS NFR BLD: 0.5 % (ref 0–1)
BILIRUB SERPL-MCNC: 2.3 MG/DL (ref 0.2–1.2)
BUN SERPL-MCNC: 14 MG/DL (ref 8–23)
CALCIUM SERPL-MCNC: 8.1 MG/DL (ref 8.8–10.2)
CHLORIDE SERPL-SCNC: 104 MMOL/L (ref 98–111)
CO2 SERPL-SCNC: 25 MMOL/L (ref 22–29)
CREAT SERPL-MCNC: 0.7 MG/DL (ref 0.5–0.9)
EOSINOPHIL # BLD: 0.5 K/UL (ref 0–0.6)
EOSINOPHIL NFR BLD: 5.3 % (ref 0–5)
ERYTHROCYTE [DISTWIDTH] IN BLOOD BY AUTOMATED COUNT: 17.4 % (ref 11.5–14.5)
GLUCOSE BLD-MCNC: 151 MG/DL (ref 70–99)
GLUCOSE BLD-MCNC: 188 MG/DL (ref 70–99)
GLUCOSE BLD-MCNC: 236 MG/DL (ref 70–99)
GLUCOSE BLD-MCNC: 86 MG/DL (ref 70–99)
GLUCOSE BLD-MCNC: 90 MG/DL (ref 70–99)
GLUCOSE SERPL-MCNC: 102 MG/DL (ref 70–99)
HCT VFR BLD AUTO: 33.6 % (ref 37–47)
HGB BLD-MCNC: 11.7 G/DL (ref 12–16)
IMM GRANULOCYTES # BLD: 0 K/UL
LYMPHOCYTES # BLD: 1.9 K/UL (ref 1.1–4.5)
LYMPHOCYTES NFR BLD: 22.5 % (ref 20–40)
MCH RBC QN AUTO: 33.1 PG (ref 27–31)
MCHC RBC AUTO-ENTMCNC: 34.8 G/DL (ref 33–37)
MCV RBC AUTO: 94.9 FL (ref 81–99)
MONOCYTES # BLD: 0.4 K/UL (ref 0–0.9)
MONOCYTES NFR BLD: 5 % (ref 0–10)
NEUTROPHILS # BLD: 5.7 K/UL (ref 1.5–7.5)
NEUTS SEG NFR BLD: 66.4 % (ref 50–65)
PERFORMED ON: ABNORMAL
PERFORMED ON: NORMAL
PERFORMED ON: NORMAL
PLATELET # BLD AUTO: 70 K/UL (ref 130–400)
PMV BLD AUTO: 10.3 FL (ref 9.4–12.3)
POTASSIUM SERPL-SCNC: 3.9 MMOL/L (ref 3.5–5)
PROT SERPL-MCNC: 5.4 G/DL (ref 6.4–8.3)
RBC # BLD AUTO: 3.54 M/UL (ref 4.2–5.4)
SODIUM SERPL-SCNC: 137 MMOL/L (ref 136–145)
WBC # BLD AUTO: 8.6 K/UL (ref 4.8–10.8)

## 2025-01-11 PROCEDURE — 2500000003 HC RX 250 WO HCPCS: Performed by: NURSE PRACTITIONER

## 2025-01-11 PROCEDURE — 1200000000 HC SEMI PRIVATE

## 2025-01-11 PROCEDURE — 80053 COMPREHEN METABOLIC PANEL: CPT

## 2025-01-11 PROCEDURE — 6370000000 HC RX 637 (ALT 250 FOR IP): Performed by: NURSE PRACTITIONER

## 2025-01-11 PROCEDURE — 82962 GLUCOSE BLOOD TEST: CPT

## 2025-01-11 PROCEDURE — 6360000002 HC RX W HCPCS: Performed by: INTERNAL MEDICINE

## 2025-01-11 PROCEDURE — 82140 ASSAY OF AMMONIA: CPT

## 2025-01-11 PROCEDURE — 36415 COLL VENOUS BLD VENIPUNCTURE: CPT

## 2025-01-11 PROCEDURE — 85025 COMPLETE CBC W/AUTO DIFF WBC: CPT

## 2025-01-11 RX ORDER — FUROSEMIDE 10 MG/ML
20 INJECTION INTRAMUSCULAR; INTRAVENOUS DAILY
Status: DISCONTINUED | OUTPATIENT
Start: 2025-01-11 | End: 2025-01-14 | Stop reason: HOSPADM

## 2025-01-11 RX ADMIN — ESCITALOPRAM OXALATE 20 MG: 10 TABLET ORAL at 09:11

## 2025-01-11 RX ADMIN — LACTULOSE 20 G: 20 SOLUTION ORAL at 22:23

## 2025-01-11 RX ADMIN — SODIUM CHLORIDE, PRESERVATIVE FREE 10 ML: 5 INJECTION INTRAVENOUS at 09:16

## 2025-01-11 RX ADMIN — LACTULOSE 20 G: 20 SOLUTION ORAL at 09:11

## 2025-01-11 RX ADMIN — FUROSEMIDE 20 MG: 10 INJECTION, SOLUTION INTRAMUSCULAR; INTRAVENOUS at 09:11

## 2025-01-11 RX ADMIN — LEVOTHYROXINE SODIUM 200 MCG: 100 TABLET ORAL at 06:47

## 2025-01-11 RX ADMIN — SODIUM CHLORIDE, PRESERVATIVE FREE 10 ML: 5 INJECTION INTRAVENOUS at 22:23

## 2025-01-11 RX ADMIN — LACTULOSE 20 G: 20 SOLUTION ORAL at 13:35

## 2025-01-12 LAB
ALBUMIN SERPL-MCNC: 2.8 G/DL (ref 3.5–5.2)
ALP SERPL-CCNC: 97 U/L (ref 35–104)
ALT SERPL-CCNC: 15 U/L (ref 5–33)
ANION GAP SERPL CALCULATED.3IONS-SCNC: 10 MMOL/L (ref 7–19)
AST SERPL-CCNC: 36 U/L (ref 5–32)
BASOPHILS # BLD: 0.1 K/UL (ref 0–0.2)
BASOPHILS NFR BLD: 0.8 % (ref 0–1)
BILIRUB SERPL-MCNC: 2.1 MG/DL (ref 0.2–1.2)
BUN SERPL-MCNC: 16 MG/DL (ref 8–23)
CALCIUM SERPL-MCNC: 8.2 MG/DL (ref 8.8–10.2)
CHLORIDE SERPL-SCNC: 103 MMOL/L (ref 98–111)
CO2 SERPL-SCNC: 23 MMOL/L (ref 22–29)
CREAT SERPL-MCNC: 0.7 MG/DL (ref 0.5–0.9)
EOSINOPHIL # BLD: 0.5 K/UL (ref 0–0.6)
EOSINOPHIL NFR BLD: 5 % (ref 0–5)
ERYTHROCYTE [DISTWIDTH] IN BLOOD BY AUTOMATED COUNT: 17.3 % (ref 11.5–14.5)
GLUCOSE BLD-MCNC: 101 MG/DL (ref 70–99)
GLUCOSE BLD-MCNC: 107 MG/DL (ref 70–99)
GLUCOSE BLD-MCNC: 164 MG/DL (ref 70–99)
GLUCOSE BLD-MCNC: 175 MG/DL (ref 70–99)
GLUCOSE BLD-MCNC: 213 MG/DL (ref 70–99)
GLUCOSE SERPL-MCNC: 100 MG/DL (ref 70–99)
HCT VFR BLD AUTO: 35.8 % (ref 37–47)
HGB BLD-MCNC: 12.3 G/DL (ref 12–16)
IMM GRANULOCYTES # BLD: 0.1 K/UL
LYMPHOCYTES # BLD: 1.8 K/UL (ref 1.1–4.5)
LYMPHOCYTES NFR BLD: 20.1 % (ref 20–40)
MCH RBC QN AUTO: 32.6 PG (ref 27–31)
MCHC RBC AUTO-ENTMCNC: 34.4 G/DL (ref 33–37)
MCV RBC AUTO: 95 FL (ref 81–99)
MONOCYTES # BLD: 0.6 K/UL (ref 0–0.9)
MONOCYTES NFR BLD: 6.3 % (ref 0–10)
NEUTROPHILS # BLD: 6 K/UL (ref 1.5–7.5)
NEUTS SEG NFR BLD: 67.2 % (ref 50–65)
PERFORMED ON: ABNORMAL
PLATELET # BLD AUTO: 82 K/UL (ref 130–400)
PMV BLD AUTO: 10 FL (ref 9.4–12.3)
POTASSIUM SERPL-SCNC: 3.7 MMOL/L (ref 3.5–5)
PROT SERPL-MCNC: 5.8 G/DL (ref 6.4–8.3)
RBC # BLD AUTO: 3.77 M/UL (ref 4.2–5.4)
SODIUM SERPL-SCNC: 136 MMOL/L (ref 136–145)
WBC # BLD AUTO: 9 K/UL (ref 4.8–10.8)

## 2025-01-12 PROCEDURE — 6360000002 HC RX W HCPCS: Performed by: INTERNAL MEDICINE

## 2025-01-12 PROCEDURE — 36415 COLL VENOUS BLD VENIPUNCTURE: CPT

## 2025-01-12 PROCEDURE — 85025 COMPLETE CBC W/AUTO DIFF WBC: CPT

## 2025-01-12 PROCEDURE — 2500000003 HC RX 250 WO HCPCS: Performed by: NURSE PRACTITIONER

## 2025-01-12 PROCEDURE — 82962 GLUCOSE BLOOD TEST: CPT

## 2025-01-12 PROCEDURE — 80053 COMPREHEN METABOLIC PANEL: CPT

## 2025-01-12 PROCEDURE — 1200000000 HC SEMI PRIVATE

## 2025-01-12 PROCEDURE — 6370000000 HC RX 637 (ALT 250 FOR IP): Performed by: NURSE PRACTITIONER

## 2025-01-12 RX ADMIN — LACTULOSE 20 G: 20 SOLUTION ORAL at 08:51

## 2025-01-12 RX ADMIN — SODIUM CHLORIDE, PRESERVATIVE FREE 10 ML: 5 INJECTION INTRAVENOUS at 08:51

## 2025-01-12 RX ADMIN — FUROSEMIDE 20 MG: 10 INJECTION, SOLUTION INTRAMUSCULAR; INTRAVENOUS at 08:51

## 2025-01-12 RX ADMIN — LEVOTHYROXINE SODIUM 200 MCG: 100 TABLET ORAL at 06:10

## 2025-01-12 RX ADMIN — LACTULOSE 20 G: 20 SOLUTION ORAL at 13:06

## 2025-01-12 RX ADMIN — SODIUM CHLORIDE, PRESERVATIVE FREE 10 ML: 5 INJECTION INTRAVENOUS at 21:05

## 2025-01-12 RX ADMIN — LACTULOSE 20 G: 20 SOLUTION ORAL at 21:05

## 2025-01-12 RX ADMIN — ESCITALOPRAM OXALATE 20 MG: 10 TABLET ORAL at 08:51

## 2025-01-12 RX ADMIN — INSULIN LISPRO 1 UNITS: 100 INJECTION, SOLUTION INTRAVENOUS; SUBCUTANEOUS at 21:05

## 2025-01-13 LAB
ALBUMIN SERPL-MCNC: 2.6 G/DL (ref 3.5–5.2)
ALP SERPL-CCNC: 103 U/L (ref 35–104)
ALT SERPL-CCNC: 17 U/L (ref 5–33)
AMMONIA PLAS-SCNC: 113 UMOL/L (ref 11–51)
ANION GAP SERPL CALCULATED.3IONS-SCNC: 9 MMOL/L (ref 7–19)
AST SERPL-CCNC: 35 U/L (ref 5–32)
BASOPHILS # BLD: 0.1 K/UL (ref 0–0.2)
BASOPHILS NFR BLD: 0.9 % (ref 0–1)
BILIRUB SERPL-MCNC: 2 MG/DL (ref 0.2–1.2)
BUN SERPL-MCNC: 17 MG/DL (ref 8–23)
CALCIUM SERPL-MCNC: 8.2 MG/DL (ref 8.8–10.2)
CHLORIDE SERPL-SCNC: 103 MMOL/L (ref 98–111)
CO2 SERPL-SCNC: 26 MMOL/L (ref 22–29)
CREAT SERPL-MCNC: 0.8 MG/DL (ref 0.5–0.9)
EOSINOPHIL # BLD: 0.5 K/UL (ref 0–0.6)
EOSINOPHIL NFR BLD: 5.8 % (ref 0–5)
ERYTHROCYTE [DISTWIDTH] IN BLOOD BY AUTOMATED COUNT: 17.3 % (ref 11.5–14.5)
GLUCOSE BLD-MCNC: 142 MG/DL (ref 70–99)
GLUCOSE BLD-MCNC: 159 MG/DL (ref 70–99)
GLUCOSE BLD-MCNC: 172 MG/DL (ref 70–99)
GLUCOSE BLD-MCNC: 98 MG/DL (ref 70–99)
GLUCOSE SERPL-MCNC: 103 MG/DL (ref 70–99)
HCT VFR BLD AUTO: 32.8 % (ref 37–47)
HGB BLD-MCNC: 11.1 G/DL (ref 12–16)
IMM GRANULOCYTES # BLD: 0 K/UL
LYMPHOCYTES # BLD: 1.8 K/UL (ref 1.1–4.5)
LYMPHOCYTES NFR BLD: 22.3 % (ref 20–40)
MCH RBC QN AUTO: 32.6 PG (ref 27–31)
MCHC RBC AUTO-ENTMCNC: 33.8 G/DL (ref 33–37)
MCV RBC AUTO: 96.5 FL (ref 81–99)
MONOCYTES # BLD: 0.5 K/UL (ref 0–0.9)
MONOCYTES NFR BLD: 6.3 % (ref 0–10)
NEUTROPHILS # BLD: 5.1 K/UL (ref 1.5–7.5)
NEUTS SEG NFR BLD: 64.4 % (ref 50–65)
PERFORMED ON: ABNORMAL
PERFORMED ON: NORMAL
PLATELET # BLD AUTO: 84 K/UL (ref 130–400)
PMV BLD AUTO: 10.2 FL (ref 9.4–12.3)
POTASSIUM SERPL-SCNC: 4.1 MMOL/L (ref 3.5–5)
PROT SERPL-MCNC: 5.5 G/DL (ref 6.4–8.3)
RBC # BLD AUTO: 3.4 M/UL (ref 4.2–5.4)
SODIUM SERPL-SCNC: 138 MMOL/L (ref 136–145)
WBC # BLD AUTO: 7.9 K/UL (ref 4.8–10.8)

## 2025-01-13 PROCEDURE — 82140 ASSAY OF AMMONIA: CPT

## 2025-01-13 PROCEDURE — 1200000000 HC SEMI PRIVATE

## 2025-01-13 PROCEDURE — 82962 GLUCOSE BLOOD TEST: CPT

## 2025-01-13 PROCEDURE — 6370000000 HC RX 637 (ALT 250 FOR IP): Performed by: NURSE PRACTITIONER

## 2025-01-13 PROCEDURE — 99232 SBSQ HOSP IP/OBS MODERATE 35: CPT | Performed by: INTERNAL MEDICINE

## 2025-01-13 PROCEDURE — 2500000003 HC RX 250 WO HCPCS: Performed by: NURSE PRACTITIONER

## 2025-01-13 PROCEDURE — 85025 COMPLETE CBC W/AUTO DIFF WBC: CPT

## 2025-01-13 PROCEDURE — 36415 COLL VENOUS BLD VENIPUNCTURE: CPT

## 2025-01-13 PROCEDURE — 80053 COMPREHEN METABOLIC PANEL: CPT

## 2025-01-13 PROCEDURE — 94760 N-INVAS EAR/PLS OXIMETRY 1: CPT

## 2025-01-13 PROCEDURE — 6360000002 HC RX W HCPCS: Performed by: INTERNAL MEDICINE

## 2025-01-13 RX ADMIN — LACTULOSE 20 G: 20 SOLUTION ORAL at 09:32

## 2025-01-13 RX ADMIN — FUROSEMIDE 20 MG: 10 INJECTION, SOLUTION INTRAMUSCULAR; INTRAVENOUS at 09:32

## 2025-01-13 RX ADMIN — SODIUM CHLORIDE, PRESERVATIVE FREE 10 ML: 5 INJECTION INTRAVENOUS at 09:32

## 2025-01-13 RX ADMIN — LEVOTHYROXINE SODIUM 200 MCG: 100 TABLET ORAL at 05:49

## 2025-01-13 RX ADMIN — LACTULOSE 20 G: 20 SOLUTION ORAL at 13:12

## 2025-01-13 RX ADMIN — SODIUM CHLORIDE, PRESERVATIVE FREE 10 ML: 5 INJECTION INTRAVENOUS at 20:13

## 2025-01-13 RX ADMIN — LACTULOSE 20 G: 20 SOLUTION ORAL at 20:12

## 2025-01-13 RX ADMIN — ESCITALOPRAM OXALATE 20 MG: 10 TABLET ORAL at 09:32

## 2025-01-13 NOTE — PROGRESS NOTES
[facility] Hospitalists Progress Note    Patient:  Yuli Duran  YOB: 1945  Date of Service: 1/12/2025  MRN: [id number]   Acct: [id number]   Primary Care Physician: Dereje John MD  Advance Directive: Full Code  Admit Date: 1/7/2025       Hospital Day: 5     NOTE: Portions of this note have been copied forward, however, changes made to reflect the most current clinical status of this patient.    CHIEF COMPLAINT:     Chief Complaint   Patient presents with    Altered Mental Status     X2-3 days family state she has been dealing with leg swelling as well       1/12/2025 8:35 AM  Subjective / Interval History:   01/12/2025  Patient seen and examined this a.m.   No new complaints.  No acute changes or acute overnight event reported.   Laying comfortably in bed in no apparent acute distress.  Denies any acute complaints or distress.      Ammonia level trended up: 89-> 101 --> 100     01/11/2025  Patient seen and examined this a.m. No new complaints.  No acute changes or acute overnight event reported. Laying comfortably in bed in no apparent acute distress.  Denies any acute complaints or distress.    Endorses overall improvement.        01/10/2025  Patient seen and examined this a.m.   No new complaints.  No acute changes or acute overnight event reported.   Laying comfortably in bed in no apparent acute distress.  Denies any acute complaints or distress.    Ammonia level trended up: 89-> 101    01/09/2025  Patient seen and examined this a.m.   No new complaints.  No acute changes or acute overnight event reported.   Laying comfortably in bed in no apparent acute distress.  Denies any acute complaints or distress.  Endorses overall improvement.        01/08/2025  Patient seen and examined this a.m. No new complaints.  No acute changes or acute overnight event reported.  Mental status improved.  Laying comfortably in bed in no apparent acute distress.  Denies any acute complaints or distress.  
      [facility]    Patient:  Yuli Duran  YOB: 1945  Date of Service: 1/13/2025  MRN: [id number]   Acct: [id number]   Primary Care Physician: Dereje John MD  Advance Directive: Full Code  Admit Date: 1/7/2025       Hospital Day: 6     NOTE: Portions of this note have been copied forward, however, changes made to reflect the most current clinical status of this patient.    CHIEF COMPLAINT:     Chief Complaint   Patient presents with    Altered Mental Status     X2-3 days family state she has been dealing with leg swelling as well       1/13/2025 1:45 PM  Subjective / Interval History:   01/13/2025  Patient seen and examined this a.m.   No new complaints.  No acute changes or acute overnight event reported.   Laying comfortably in bed in no apparent acute distress.  Denies any acute complaints or distress.    Endorses overall improvement.      Ammonia level trending up: 89-> 101 --> 100 --> 113     01/12/2025  Patient seen and examined this a.m.   No new complaints.  No acute changes or acute overnight event reported.   Laying comfortably in bed in no apparent acute distress.  Denies any acute complaints or distress.      Ammonia level trended up: 89-> 101 --> 100     01/11/2025  Patient seen and examined this a.m. No new complaints.  No acute changes or acute overnight event reported. Laying comfortably in bed in no apparent acute distress.  Denies any acute complaints or distress.    Endorses overall improvement.        01/10/2025  Patient seen and examined this a.m.   No new complaints.  No acute changes or acute overnight event reported.   Laying comfortably in bed in no apparent acute distress.  Denies any acute complaints or distress.    Ammonia level trended up: 89-> 101    01/09/2025  Patient seen and examined this a.m.   No new complaints.  No acute changes or acute overnight event reported.   Laying comfortably in bed in no apparent acute distress.  Denies 
      [facility] Hospitalists Progress Note    Patient:  Yuli Duran  YOB: 1945  Date of Service: 1/10/2025  MRN: [id number]   Acct: [id number]   Primary Care Physician: Dereje John MD  Advance Directive: Full Code  Admit Date: 1/7/2025       Hospital Day: 3     NOTE: Portions of this note have been copied forward, however, changes made to reflect the most current clinical status of this patient.    CHIEF COMPLAINT:     Chief Complaint   Patient presents with    Altered Mental Status     X2-3 days family state she has been dealing with leg swelling as well       1/10/2025 8:55 AM  Subjective / Interval History:   01/10/2025  Patient seen and examined this a.m.   No new complaints.  No acute changes or acute overnight event reported.   Laying comfortably in bed in no apparent acute distress.  Denies any acute complaints or distress.    Ammonia level trended up: 89-> 101    01/09/2025  Patient seen and examined this a.m.   No new complaints.  No acute changes or acute overnight event reported.   Laying comfortably in bed in no apparent acute distress.  Denies any acute complaints or distress.  Endorses overall improvement.        01/08/2025  Patient seen and examined this a.m. No new complaints.  No acute changes or acute overnight event reported.  Mental status improved.  Laying comfortably in bed in no apparent acute distress.  Denies any acute complaints or distress.    Endorses overall improvement.        Review of Systems:   Review of Systems  ROS: 14 point review of systems is negative except as specifically addressed above.    ADULT DIET; Regular; 4 carb choices (60 gm/meal); Low Fat/Low Chol/High Fiber/KANDI; No Caffeine    Intake/Output Summary (Last 24 hours) at 1/10/2025 0855  Last data filed at 1/9/2025 1434  Gross per 24 hour   Intake 10 ml   Output --   Net 10 ml       Medications:   sodium chloride       Current Facility-Administered Medications   Medication Dose Route Frequency 
      [facility]    Patient:  Yuli Duran  YOB: 1945  Date of Service: 1/8/2025  MRN: [id number]   Acct: [id number]   Primary Care Physician: Dereje John MD  Advance Directive: Full Code  Admit Date: 1/7/2025       Hospital Day: 1     NOTE: Portions of this note have been copied forward, however, changes made to reflect the most current clinical status of this patient.    CHIEF COMPLAINT:     Chief Complaint   Patient presents with    Altered Mental Status     X2-3 days family state she has been dealing with leg swelling as well       1/8/2025 12:46 PM  Subjective / Interval History:   01/08/2025  Patient seen and examined this a.m. No new complaints.  No acute changes or acute overnight event reported.  Mental status improved.  Laying comfortably in bed in no apparent acute distress.  Denies any acute complaints or distress.    Endorses overall improvement.        Review of Systems:   Review of Systems  ROS: 14 point review of systems is negative except as specifically addressed above.    ADULT DIET; Regular; 4 carb choices (60 gm/meal); Low Fat/Low Chol/High Fiber/KANID    Intake/Output Summary (Last 24 hours) at 1/8/2025 1246  Last data filed at 1/7/2025 2214  Gross per 24 hour   Intake 300 ml   Output --   Net 300 ml       Medications:   sodium chloride       Current Facility-Administered Medications   Medication Dose Route Frequency Provider Last Rate Last Admin    escitalopram (LEXAPRO) tablet 20 mg  20 mg Oral Daily Kaitlyn Long APRN   20 mg at 01/08/25 0914    levothyroxine (SYNTHROID) tablet 200 mcg  200 mcg Oral Daily Kaitlyn Long APRN   200 mcg at 01/08/25 0914    sodium chloride flush 0.9 % injection 5-40 mL  5-40 mL IntraVENous 2 times per day Kaitlyn Long APRN   10 mL at 01/08/25 0745    sodium chloride flush 0.9 % injection 5-40 mL  5-40 mL IntraVENous PRN Kaitlyn Long APRN        0.9 % sodium chloride infusion   IntraVENous 
24 hr orders verified Electronically signed by Dedra Stewart RN on 1/10/2025 at 10:59 PM      
24 hr orders verified Electronically signed by Dedra Stewart RN on 1/12/2025 at 1:16 AM    
24 hr orders verified Electronically signed by Dedra Stewart RN on 1/13/2025 at 3:56 AM    
24 hr orders verified. Electronically signed by Leonor Vazquez RN on 1/9/2025 at 12:34 AM    
4 Eyes Skin Assessment     NAME:  Yuli Duran  YOB: 1945  MEDICAL RECORD NUMBER:  [id number]    The patient is being assessed for  Admission    I agree that at least one RN has performed a thorough Head to Toe Skin Assessment on the patient. ALL assessment sites listed below have been assessed.      Areas assessed by both nurses:    Head, Face, Ears, Shoulders, Back, Chest, Arms, Elbows, Hands, Sacrum. Buttock, Coccyx, Ischium, and Legs. Feet and Heels        Does the Patient have a Wound? No noted wound(s)       Reid Prevention initiated by RN: No  Wound Care Orders initiated by RN: No    Pressure Injury (Stage 3,4, Unstageable, DTI, NWPT, and Complex wounds) if present, place Wound referral order by RN under : No    New Ostomies, if present place, Ostomy referral order under : No     Nurse 1 eSignature: Electronically signed by Leonor Vazquez RN on 1/7/25 at 10:16 PM CST    **SHARE this note so that the co-signing nurse can place an eSignature**    Nurse 2 eSignature: Electronically signed by dEuin Lundberg RN on 1/8/25 at 12:52 AM CST   
Admission orders verified. Electronically signed by Leonor Vazquez RN on 1/8/2025 at 2:26 AM    
Progress Note            Date:1/10/2025        Patient Name:Yuli Duran     YOB: 1945     Age:79 y.o.    CC: Follow-up acute decompensated Miller liver disease concerning for progression to cirrhosis.    Mental status is clear today.  Seems to be more close to her baseline.  No issues overnight.  No abdominal pain.  Bilirubin remains elevated.    Physical Exam   BP (!) 139/54   Pulse 72   Temp 97.7 °F (36.5 °C) (Temporal)   Resp 16   Ht 1.651 m (5' 5\")   Wt 109.8 kg (242 lb)   SpO2 94%   BMI 40.27 kg/m²      - GENERAL: Alert and oriented x 3. No acute distress. Well-nourished.    - EYES: EOMI. Anicteric.    - HENT: Moist mucous membranes. No cervical lymphadenopathy.    - LUNGS: Clear to auscultation bilaterally. No accessory muscle use.    - CARDIOVASCULAR: Regular rate and rhythm. No murmur. No JVD.    - ABDOMEN: Soft, non-tender and non-distended. No palpable masses.    - EXTREMITIES: No edema. Non-tender.?      Labs    CBC:  Recent Labs     01/08/25 0548 01/09/25  0448 01/10/25  0406   WBC 5.1 5.4 7.8   RBC 3.56* 3.52* 3.83*   HGB 11.4* 11.2* 12.4   HCT 34.0* 33.5* 37.5   MCV 95.5 95.2 97.9   RDW 16.7* 17.2* 17.3*   PLT 60* 60* 60*     CHEMISTRIES:  Recent Labs     01/08/25 0548 01/09/25 0448 01/10/25  0406    138 139   K 3.4* 3.7 3.7    104 105   CO2 26 24 24   BUN 13 12 14   CREATININE 0.8 0.8 0.6   GLUCOSE 94 102* 129*   MG 1.8  --   --      PT/INR:  Recent Labs     01/08/25 0548 01/09/25  1559   PROTIME 17.4* 15.6*   INR 1.46* 1.28*     APTT:No results for input(s): \"APTT\" in the last 72 hours.  LIVER PROFILE:  Recent Labs     01/08/25 0548 01/09/25 0448 01/10/25  0406   AST 37* 37* 37*   ALT 16 15 16   BILITOT 2.1* 2.1* 2.3*   ALKPHOS 93 91 89         Assessment & Plan:   This is a 79-year-old female with obesity (BMI 40) with a remote history of reported MILLER (no cirrhosis) and hypothyroidism with diabetes mellitus type 2 who presents with altered mental status 
Progress Note            Date:1/13/2025        Patient Name:Yuli Duran     YOB: 1945     Age:79 y.o.    CC: Follow-up decompensated Miller cirrhosis.    Mental status seems at baseline.  No issues overnight.    Physical Exam   BP (!) 137/56   Pulse 71   Temp 98 °F (36.7 °C) (Temporal)   Resp 18   Ht 1.651 m (5' 5\")   Wt 109.8 kg (242 lb)   SpO2 93%   BMI 40.27 kg/m²      - GENERAL: Alert and oriented x 3. No acute distress. Well-nourished.    - EYES: EOMI. Anicteric.    - HENT: Moist mucous membranes. No cervical lymphadenopathy.    - LUNGS: Clear to auscultation bilaterally. No accessory muscle use.    - CARDIOVASCULAR: Regular rate and rhythm. No murmur. No JVD.    - ABDOMEN: Soft, non-tender and non-distended. No palpable masses.    - EXTREMITIES: No edema. Non-tender.    Labs    CBC:  Recent Labs     01/11/25 0419 01/12/25 0419 01/13/25 0406   WBC 8.6 9.0 7.9   RBC 3.54* 3.77* 3.40*   HGB 11.7* 12.3 11.1*   HCT 33.6* 35.8* 32.8*   MCV 94.9 95.0 96.5   RDW 17.4* 17.3* 17.3*   PLT 70* 82* 84*     CHEMISTRIES:  Recent Labs     01/11/25 0419 01/12/25 0419 01/13/25 0406    136 138   K 3.9 3.7 4.1    103 103   CO2 25 23 26   BUN 14 16 17   CREATININE 0.7 0.7 0.8   GLUCOSE 102* 100* 103*     PT/INR:No results for input(s): \"PROTIME\", \"INR\" in the last 72 hours.  APTT:No results for input(s): \"APTT\" in the last 72 hours.  LIVER PROFILE:  Recent Labs     01/11/25 0419 01/12/25 0419 01/13/25 0406   AST 33* 36* 35*   ALT 14 15 17   BILITOT 2.3* 2.1* 2.0*   ALKPHOS 86 97 103         Assessment & Plan:   This is a 79-year-old female with obesity (BMI 40) with a remote history of reported MILLER (no cirrhosis) and hypothyroidism with diabetes mellitus type 2 who presents with altered mental status 1/8/2025.  Given her history of Miller cirrhosis with altered mental status and elevated ammonia, we have been asked to further evaluate for acute decompensated chronic liver disease and 
cirrhosis with altered mental status and elevated ammonia, we have been asked to further evaluate for acute decompensated chronic liver disease and hepatic encephalopathy.     Abnormal liver enzymes, obstructive pattern in the setting of altered mental status with elevated ammonia level.  She has a history of Bernabe but no cirrhosis.  However, now, her labs are showing some signs of potential portal hypertension and possible cirrhosis given the low albumin and platelet count.  And also the splenomegaly seen on imaging.  Additionally her ammonia level is elevated.  Normal creatinine.  No ascites on imaging.  She does have evidence of lower extremity pitting edema/volume overload.  Asterixis on my exam.  To have a better idea of definitive diagnosis of cirrhosis in this patient, she will need to become established in GI clinic and undergo further studies such as elastography or FibroScan to determine whether there is cirrhosis.  Please arrange upon discharge.  Follow-up urine culture.  Continue lactulose p.o. 3 times daily.  Titrate to produce 2-3 bowel movements per day.  Continue to trend daily enzymes.  I will check an INR tomorrow to ensure synthetic function is intact.     Hypothyroidism, uncontrolled, she stopped her Synthroid after she ran out 1 month ago.  Severe hypothyroidism can also contribute to her symptoms.  Keep this in mind.  The team has started 200 mcg of Synthroid.     Lower extremity pitting edema/volume overload.  2D echo unremarkable on this admission.  I would recommend diuresis if renal function allows..  Given her albumin level she may benefit from albumin infusion prior to diuresis.    
encephalopathy (HCC)  Active Problems:    Thrombocytopenia (HCC)    Liver fibrosis    Vitamin D deficiency    MILLER (nonalcoholic steatohepatitis)    Controlled type 2 diabetes mellitus without complication, without long-term current use of insulin (HCC)    Primary hypertension    Other specified hypothyroidism    Insomnia  Resolved Problems:    * No resolved hospital problems. *          Brief History/Summary:   As per Initial admission HPI 1/7/2025, quoted below;  Ms Duran, \" a 79 y.o. female with a PMH of type 2 diabetes, Miller, insomnia splenomegaly, HTN and vitamin D deficiency who presented to [facility] ED on 1/7/25 complaining of AMS. Family is at bedside assisting his hx, as she is slow to respond. Family states that she she began to become increasingly altered on the day prior to admission.  The patient states that she feels \"spacey\".  Her daughter states that she has noticed hand fluttering consistent with asterixis.  She does report constipation.  Her daughter states that she has noticed that the patient has not required a pickup of her levothyroxine as often.  She has known Miller, however, she denies a confirmed history of cirrhosis.  She has chronic edema to bilateral lower extremities.  Denied fever, chills, unilateral weakness, chest pain or palpitations.  Further ED workup revealed normal electrolytes.  Ammonia 89, AST 35, bilirubin 2.8.  TSH 44.860 and T4-0.26.  UDS negative.  WBC 4.6, H&H 12.2/35.8 with a platelet count of 66.  UA moderate leukocytes, however, negative WBC.  CT of the head no acute intercranial abnormality.  Chest x-ray no acute disease.  She is on room air and in no acute distress.  The patient will be admitted to hospital medicine for altered mental status, hypothyroidism, and hepatic encephalopathy.\"    Hepatic encephalopathy (HCC)  Ammonia-86 on presentation (01/07/2025)  Lactulose 20g PO TID and monitor bowel movement  Neurochecks Q4 hours    Liver fibrosis  MILLER (nonalcoholic 
will be admitted to hospital medicine for altered mental status, hypothyroidism, and hepatic encephalopathy.\"    Hepatic encephalopathy (HCC)  Ammonia: 86 on presentation (01/07/2025)  Ammonia level trended up: 89-> 101 (01/09/2025)  Lactulose 20 g PO TID and monitor bowel movement  Neurochecks Q4 hours    Liver fibrosis  MILLER (nonalcoholic steatohepatitis)  Splenomegaly  B/l LE Edema  ? Cirrhosis from MILLER based on encephalopathy and labs  US liver 01/08/2025: Liver parenchyma echogenicity suggestive of homogeneous fatty infiltration and/or chronic hepatocellular disease. Prominent 9 mm diameter, common bile duct 98 neutral for a postcholecystectomy patient of this age.  However, correlate with clinical and laboratory data. Splenomegaly incidentally noted. Pancreas head and tail not visible/evaluated due to bowel gas artifact.  Acute hepatitis panel negative  GI consulted  2D Echo - 01/09/2025 -summary report as noted above.  Recommend diuresing patient.  Furosemide 20 mg IV daily   Monitored renal function.    Thrombocytopenia  Splenomegaly  Likely due to liver disease.  Continue monitoring CBC    Vitamin D deficiency  Home vitamin D resumed     Controlled type 2 diabetes mellitus without complication, without long-term current use of insulin (HCC)  Hemoglobin A1C   Insulin Lispro (Humalog) on a low dose sliding scale  Monitor POC glucose, and adjust insulin regimen accordingly based on daily insulin requirement.      Other specified Hypothyroidism  Reportedly noncompliant to levothyroxine at home.  TSH level of 44.860, with a T4 of 0.26, (01/07/2025)  Resume home dose of - Levothyroxine 200 mcg p.o. daily      Insomnia  Hold Trazodone due to AMS     Hypokalemia  Replace as per protocol        Continue management of other chronic medical conditions - see above and orders.          Advance Directive: Full Code    ADULT DIET; Regular; 4 carb choices (60 gm/meal); Low Fat/Low Chol/High Fiber/KANDI; No Caffeine

## 2025-01-13 NOTE — CARE COORDINATION
01/13/25 1315   IMM Letter   IMM Letter given to Patient/Family/Significant other/Guardian/POA/by: bonilla brannon sw   IMM Letter date given: 01/13/25   IMM Letter time given: 1250     Second IMM given to patient and explained with patient verbalizing understanding.  All questions and concerns addressed     Signed letter placed in pt soft chart   Patient declined waiting 4 hr period prior to discharge.   Electronically signed by Bonilla Brannon on 1/13/2025 at 1:15 PM

## 2025-01-14 ENCOUNTER — TELEPHONE (OUTPATIENT)
Dept: GASTROENTEROLOGY | Age: 80
End: 2025-01-14

## 2025-01-14 VITALS
DIASTOLIC BLOOD PRESSURE: 53 MMHG | SYSTOLIC BLOOD PRESSURE: 130 MMHG | WEIGHT: 242 LBS | BODY MASS INDEX: 40.32 KG/M2 | RESPIRATION RATE: 18 BRPM | TEMPERATURE: 97.5 F | OXYGEN SATURATION: 94 % | HEART RATE: 76 BPM | HEIGHT: 65 IN

## 2025-01-14 LAB
ALBUMIN SERPL-MCNC: 2.8 G/DL (ref 3.5–5.2)
ALP SERPL-CCNC: 99 U/L (ref 35–104)
ALT SERPL-CCNC: 17 U/L (ref 5–33)
AMMONIA PLAS-SCNC: 62 UMOL/L (ref 11–51)
ANION GAP SERPL CALCULATED.3IONS-SCNC: 8 MMOL/L (ref 7–19)
AST SERPL-CCNC: 35 U/L (ref 5–32)
BASOPHILS # BLD: 0.1 K/UL (ref 0–0.2)
BASOPHILS NFR BLD: 0.8 % (ref 0–1)
BILIRUB SERPL-MCNC: 2.2 MG/DL (ref 0.2–1.2)
BUN SERPL-MCNC: 15 MG/DL (ref 8–23)
CALCIUM SERPL-MCNC: 8.3 MG/DL (ref 8.8–10.2)
CHLORIDE SERPL-SCNC: 102 MMOL/L (ref 98–111)
CO2 SERPL-SCNC: 26 MMOL/L (ref 22–29)
CREAT SERPL-MCNC: 0.8 MG/DL (ref 0.5–0.9)
EOSINOPHIL # BLD: 0.5 K/UL (ref 0–0.6)
EOSINOPHIL NFR BLD: 6.1 % (ref 0–5)
ERYTHROCYTE [DISTWIDTH] IN BLOOD BY AUTOMATED COUNT: 17.8 % (ref 11.5–14.5)
GLUCOSE BLD-MCNC: 190 MG/DL (ref 70–99)
GLUCOSE BLD-MCNC: 94 MG/DL (ref 70–99)
GLUCOSE SERPL-MCNC: 100 MG/DL (ref 70–99)
HCT VFR BLD AUTO: 31.5 % (ref 37–47)
HGB BLD-MCNC: 10.7 G/DL (ref 12–16)
IMM GRANULOCYTES # BLD: 0 K/UL
LKM-1 IGG SER IA-ACNC: 1.1 U (ref 0–24.9)
LYMPHOCYTES # BLD: 1.9 K/UL (ref 1.1–4.5)
LYMPHOCYTES NFR BLD: 24 % (ref 20–40)
MCH RBC QN AUTO: 32.8 PG (ref 27–31)
MCHC RBC AUTO-ENTMCNC: 34 G/DL (ref 33–37)
MCV RBC AUTO: 96.6 FL (ref 81–99)
MITOCHONDRIA M2 IGG SER-ACNC: 13.8 UNITS (ref 0–24.9)
MONOCYTES # BLD: 0.5 K/UL (ref 0–0.9)
MONOCYTES NFR BLD: 6 % (ref 0–10)
NEUTROPHILS # BLD: 4.9 K/UL (ref 1.5–7.5)
NEUTS SEG NFR BLD: 62.7 % (ref 50–65)
NUCLEAR IGG SER QL IA: NORMAL
PERFORMED ON: ABNORMAL
PERFORMED ON: NORMAL
PLATELET # BLD AUTO: 93 K/UL (ref 130–400)
PMV BLD AUTO: 10.2 FL (ref 9.4–12.3)
POTASSIUM SERPL-SCNC: 3.9 MMOL/L (ref 3.5–5)
PROT SERPL-MCNC: 5.5 G/DL (ref 6.4–8.3)
RBC # BLD AUTO: 3.26 M/UL (ref 4.2–5.4)
SODIUM SERPL-SCNC: 136 MMOL/L (ref 136–145)
WBC # BLD AUTO: 7.8 K/UL (ref 4.8–10.8)

## 2025-01-14 PROCEDURE — 85025 COMPLETE CBC W/AUTO DIFF WBC: CPT

## 2025-01-14 PROCEDURE — 2500000003 HC RX 250 WO HCPCS: Performed by: NURSE PRACTITIONER

## 2025-01-14 PROCEDURE — 6370000000 HC RX 637 (ALT 250 FOR IP): Performed by: NURSE PRACTITIONER

## 2025-01-14 PROCEDURE — 6360000002 HC RX W HCPCS: Performed by: INTERNAL MEDICINE

## 2025-01-14 PROCEDURE — 80053 COMPREHEN METABOLIC PANEL: CPT

## 2025-01-14 PROCEDURE — 36415 COLL VENOUS BLD VENIPUNCTURE: CPT

## 2025-01-14 PROCEDURE — 82140 ASSAY OF AMMONIA: CPT

## 2025-01-14 PROCEDURE — 82962 GLUCOSE BLOOD TEST: CPT

## 2025-01-14 RX ORDER — LACTULOSE 10 G/15ML
20 SOLUTION ORAL 3 TIMES DAILY
Qty: 2700 ML | Refills: 0 | Status: SHIPPED | OUTPATIENT
Start: 2025-01-14 | End: 2025-01-14

## 2025-01-14 RX ORDER — LACTULOSE 10 G/15ML
20 SOLUTION ORAL 3 TIMES DAILY
Qty: 2700 ML | Refills: 0 | Status: SHIPPED | OUTPATIENT
Start: 2025-01-14 | End: 2025-02-13

## 2025-01-14 RX ORDER — FUROSEMIDE 20 MG/1
20 TABLET ORAL DAILY
Qty: 90 TABLET | Refills: 1 | Status: SHIPPED | OUTPATIENT
Start: 2025-01-14 | End: 2025-01-14

## 2025-01-14 RX ORDER — FUROSEMIDE 20 MG/1
20 TABLET ORAL DAILY
Qty: 90 TABLET | Refills: 1 | Status: SHIPPED | OUTPATIENT
Start: 2025-01-14

## 2025-01-14 RX ADMIN — LACTULOSE 20 G: 20 SOLUTION ORAL at 08:15

## 2025-01-14 RX ADMIN — ESCITALOPRAM OXALATE 20 MG: 10 TABLET ORAL at 08:15

## 2025-01-14 RX ADMIN — INSULIN LISPRO 1 UNITS: 100 INJECTION, SOLUTION INTRAVENOUS; SUBCUTANEOUS at 11:19

## 2025-01-14 RX ADMIN — SODIUM CHLORIDE, PRESERVATIVE FREE 10 ML: 5 INJECTION INTRAVENOUS at 09:00

## 2025-01-14 RX ADMIN — LACTULOSE 20 G: 20 SOLUTION ORAL at 14:24

## 2025-01-14 RX ADMIN — FUROSEMIDE 20 MG: 10 INJECTION, SOLUTION INTRAMUSCULAR; INTRAVENOUS at 08:15

## 2025-01-14 RX ADMIN — LEVOTHYROXINE SODIUM 200 MCG: 100 TABLET ORAL at 06:01

## 2025-01-14 NOTE — DISCHARGE INSTR - DIET
Good nutrition is important when healing from an illness, injury, or surgery.  Follow any nutrition recommendations given to you during your hospital stay.   If you were given an oral nutrition supplement while in the hospital, continue to take this supplement at home.  You can take it with meals, in-between meals, and/or before bedtime. These supplements can be purchased at most local grocery stores, pharmacies, and chain [employer]-stores.   If you have any questions about your diet or nutrition, call the hospital and ask for the dietitian.    ADULT DIET; Regular; 4 carb choices (60 gm/meal); Low Fat/Low Cholesterol/High Fiber/ low sodium diet ; No Caffeine

## 2025-01-14 NOTE — DISCHARGE INSTRUCTIONS
Keep follow up appointments   Take medications as directed  Seek medical assistance for recurrent or worsening symptoms

## 2025-01-14 NOTE — DISCHARGE SUMMARY
[address] [place] [address]  [facility]    DISCHARGE SUMMARY:        Yuli Duran  :  1945  MRN:  [id number]    Admit date:  2025  Discharge date:   2025    Admitting Physician:  Scottie Norris MD    Advance Directive: Full Code    Consults Made:   IP CONSULT TO GI      Primary Care Physician:  Dereje John MD    Discharge Diagnoses:  Principal Problem:    Hepatic encephalopathy (HCC)  Active Problems:    Thrombocytopenia (HCC)    Liver fibrosis    Vitamin D deficiency    MILLER (nonalcoholic steatohepatitis)    Controlled type 2 diabetes mellitus without complication, without long-term current use of insulin (HCC)    Primary hypertension    Other specified hypothyroidism    Insomnia  Resolved Problems:    * No resolved hospital problems. *          Pertinent Labs:  CBC with DIFF:  Recent Labs     25  0406 25  0510   WBC 9.0 7.9 7.8   RBC 3.77* 3.40* 3.26*   HGB 12.3 11.1* 10.7*   HCT 35.8* 32.8* 31.5*   MCV 95.0 96.5 96.6   MCH 32.6* 32.6* 32.8*   MCHC 34.4 33.8 34.0   RDW 17.3* 17.3* 17.8*   PLT 82* 84* 93*   MPV 10.0 10.2 10.2   NEUTOPHILPCT 67.2* 64.4 62.7   LYMPHOPCT 20.1 22.3 24.0   MONOPCT 6.3 6.3 6.0   BASOPCT 0.8 0.9 0.8   NEUTROABS 6.0 5.1 4.9   LYMPHSABS 1.8 1.8 1.9   MONOSABS 0.60 0.50 0.50   EOSABS 0.50 0.50 0.50   BASOSABS 0.10 0.10 0.10       CMP/BMP:  Recent Labs     25  0406 25  0510    138 136   K 3.7 4.1 3.9    103 102   CO2 23 26 26   ANIONGAP 10 9 8   GLUCOSE 100* 103* 100*   BUN 16 17 15   CREATININE 0.7 0.8 0.8   LABGLOM 89 76 76   CALCIUM 8.2* 8.2* 8.3*   BILITOT 2.1* 2.0* 2.2*   ALKPHOS 97 103 99   ALT 15 17 17   AST 36* 35* 35*         CRP:  No results for input(s): \"CRP\" in the last 72 hours.  Sed Rate:  No results for input(s): \"SEDRATE\" in the last 72 hours.      HgBA1c:  No components found for: \"HGBA1C\"  FLP:    Lab Results   Component Value

## 2025-01-14 NOTE — TELEPHONE ENCOUNTER
Yuli is a new patient who was seen in the ED on 1-7-2025 for   Liver fibrosis    Please call her to schedule an appt   Ph # [phone]    Thank you

## 2025-01-16 ENCOUNTER — OFFICE VISIT (OUTPATIENT)
Dept: INTERNAL MEDICINE | Age: 80
End: 2025-01-16

## 2025-01-16 VITALS
HEIGHT: 65 IN | BODY MASS INDEX: 39.65 KG/M2 | HEART RATE: 76 BPM | SYSTOLIC BLOOD PRESSURE: 130 MMHG | WEIGHT: 238 LBS | DIASTOLIC BLOOD PRESSURE: 68 MMHG | OXYGEN SATURATION: 98 %

## 2025-01-16 DIAGNOSIS — R16.1 SPLENOMEGALY: ICD-10-CM

## 2025-01-16 DIAGNOSIS — K76.82 HEPATIC ENCEPHALOPATHY (HCC): Primary | ICD-10-CM

## 2025-01-16 DIAGNOSIS — K75.81 NASH (NONALCOHOLIC STEATOHEPATITIS): ICD-10-CM

## 2025-01-16 LAB
SMA IGG SER-ACNC: 55 UNITS (ref 0–19)
SMOOTH MUSCLE IGG TITR SER: ABNORMAL {TITER}

## 2025-01-16 RX ORDER — LEVOTHYROXINE SODIUM 200 UG/1
200 TABLET ORAL DAILY
Qty: 30 TABLET | Refills: 3 | Status: SHIPPED | OUTPATIENT
Start: 2025-01-16

## 2025-01-16 ASSESSMENT — PATIENT HEALTH QUESTIONNAIRE - PHQ9
SUM OF ALL RESPONSES TO PHQ QUESTIONS 1-9: 0
5. POOR APPETITE OR OVEREATING: NOT AT ALL
SUM OF ALL RESPONSES TO PHQ9 QUESTIONS 1 & 2: 0
SUM OF ALL RESPONSES TO PHQ QUESTIONS 1-9: 0
10. IF YOU CHECKED OFF ANY PROBLEMS, HOW DIFFICULT HAVE THESE PROBLEMS MADE IT FOR YOU TO DO YOUR WORK, TAKE CARE OF THINGS AT HOME, OR GET ALONG WITH OTHER PEOPLE: NOT DIFFICULT AT ALL
SUM OF ALL RESPONSES TO PHQ QUESTIONS 1-9: 0
3. TROUBLE FALLING OR STAYING ASLEEP: NOT AT ALL
1. LITTLE INTEREST OR PLEASURE IN DOING THINGS: NOT AT ALL
2. FEELING DOWN, DEPRESSED OR HOPELESS: NOT AT ALL
9. THOUGHTS THAT YOU WOULD BE BETTER OFF DEAD, OR OF HURTING YOURSELF: NOT AT ALL
8. MOVING OR SPEAKING SO SLOWLY THAT OTHER PEOPLE COULD HAVE NOTICED. OR THE OPPOSITE, BEING SO FIGETY OR RESTLESS THAT YOU HAVE BEEN MOVING AROUND A LOT MORE THAN USUAL: NOT AT ALL
4. FEELING TIRED OR HAVING LITTLE ENERGY: NOT AT ALL
7. TROUBLE CONCENTRATING ON THINGS, SUCH AS READING THE NEWSPAPER OR WATCHING TELEVISION: NOT AT ALL
SUM OF ALL RESPONSES TO PHQ QUESTIONS 1-9: 0
6. FEELING BAD ABOUT YOURSELF - OR THAT YOU ARE A FAILURE OR HAVE LET YOURSELF OR YOUR FAMILY DOWN: NOT AT ALL

## 2025-01-16 ASSESSMENT — ENCOUNTER SYMPTOMS
TROUBLE SWALLOWING: 0
BLOOD IN STOOL: 0
EYE DISCHARGE: 0
SHORTNESS OF BREATH: 0
ABDOMINAL PAIN: 0
SINUS PRESSURE: 0
EYE ITCHING: 0
BACK PAIN: 0
NAUSEA: 0
ABDOMINAL DISTENTION: 0
WHEEZING: 0
VOMITING: 0
SORE THROAT: 0

## 2025-01-16 NOTE — PROGRESS NOTES
Post-Discharge Transitional Care Management Services      Yuli Duran   YOB: 1945    Date of Visit:  1/16/2025  30 Day Post-Discharge Date: 2/14/2025    No Known Allergies  Outpatient Medications Marked as Taking for the 1/16/25 encounter (Office Visit) with Dereje John MD   Medication Sig Dispense Refill    furosemide (LASIX) 20 MG tablet Take 1 tablet by mouth daily 90 tablet 1    lactulose (CHRONULAC) 10 GM/15ML solution Take 30 mLs by mouth 3 times daily 2700 mL 0    levothyroxine (SYNTHROID) 200 MCG tablet Take 1 tablet by mouth daily 30 tablet 3    vitamin D (ERGOCALCIFEROL) 1.25 MG (43136 UT) CAPS capsule Take 1 capsule by mouth Once a week at 5 PM 12 capsule 1    escitalopram (LEXAPRO) 20 MG tablet TAKE 1 TABLET BY MOUTH EVERY DAY EVERY MORNING      traZODone (DESYREL) 50 MG tablet TAKE 1 TABLET BY MOUTH EVERY DAY EVERY EVENING           Vitals:    01/16/25 1047   BP: 130/68   Position: Sitting   Pulse: 76   SpO2: 98%   Weight: 108 kg (238 lb)   Height: 1.651 m (5' 5\")     Body mass index is 39.61 kg/m².     Wt Readings from Last 3 Encounters:   01/16/25 108 kg (238 lb)   01/09/25 109.8 kg (242 lb)   12/16/24 109.8 kg (242 lb)     BP Readings from Last 3 Encounters:   01/16/25 130/68   01/14/25 (!) 130/53   12/16/24 138/80        Patient was admitted to [facility] from 1/7/2025 to 1/14/2025 for acute hepatic encephalopathy.    HPI:  Patient was admitted to [facility] on 1/7/2025 with confusion and hepatic encephalopathy.  Her ammonia level was in the 80s and proceeded to climb.  Over the course of the week she was in the hospital she was seen by GI and placed on lactulose 3 times daily.  Her ammonia level has come down.  She was also found to be low on her thyroid due to dietary and medication noncompliance as an outpatient.  She has been started back on her Synthroid.  She appears to still be confused about what she needs to be doing dose wise.  Daughter

## 2025-01-17 ENCOUNTER — OFFICE VISIT (OUTPATIENT)
Dept: GASTROENTEROLOGY | Age: 80
End: 2025-01-17
Payer: MEDICARE

## 2025-01-17 VITALS
DIASTOLIC BLOOD PRESSURE: 65 MMHG | BODY MASS INDEX: 39.32 KG/M2 | SYSTOLIC BLOOD PRESSURE: 125 MMHG | HEART RATE: 80 BPM | WEIGHT: 236 LBS | OXYGEN SATURATION: 94 % | HEIGHT: 65 IN

## 2025-01-17 DIAGNOSIS — K76.82 HEPATIC ENCEPHALOPATHY (HCC): ICD-10-CM

## 2025-01-17 DIAGNOSIS — K74.69 OTHER CIRRHOSIS OF LIVER (HCC): Primary | ICD-10-CM

## 2025-01-17 DIAGNOSIS — E11.9 TYPE 2 DIABETES MELLITUS WITHOUT COMPLICATION, UNSPECIFIED WHETHER LONG TERM INSULIN USE (HCC): ICD-10-CM

## 2025-01-17 DIAGNOSIS — Z09 HOSPITAL DISCHARGE FOLLOW-UP: ICD-10-CM

## 2025-01-17 PROCEDURE — 1124F ACP DISCUSS-NO DSCNMKR DOCD: CPT | Performed by: NURSE PRACTITIONER

## 2025-01-17 PROCEDURE — 3078F DIAST BP <80 MM HG: CPT | Performed by: NURSE PRACTITIONER

## 2025-01-17 PROCEDURE — 3044F HG A1C LEVEL LT 7.0%: CPT | Performed by: NURSE PRACTITIONER

## 2025-01-17 PROCEDURE — 1111F DSCHRG MED/CURRENT MED MERGE: CPT | Performed by: NURSE PRACTITIONER

## 2025-01-17 PROCEDURE — 3074F SYST BP LT 130 MM HG: CPT | Performed by: NURSE PRACTITIONER

## 2025-01-17 PROCEDURE — G8399 PT W/DXA RESULTS DOCUMENT: HCPCS | Performed by: NURSE PRACTITIONER

## 2025-01-17 PROCEDURE — 1036F TOBACCO NON-USER: CPT | Performed by: NURSE PRACTITIONER

## 2025-01-17 PROCEDURE — G8427 DOCREV CUR MEDS BY ELIG CLIN: HCPCS | Performed by: NURSE PRACTITIONER

## 2025-01-17 PROCEDURE — 1090F PRES/ABSN URINE INCON ASSESS: CPT | Performed by: NURSE PRACTITIONER

## 2025-01-17 PROCEDURE — G8417 CALC BMI ABV UP PARAM F/U: HCPCS | Performed by: NURSE PRACTITIONER

## 2025-01-17 PROCEDURE — 99214 OFFICE O/P EST MOD 30 MIN: CPT | Performed by: NURSE PRACTITIONER

## 2025-01-17 NOTE — PROGRESS NOTES
Subjective:     Patient ID: Yuli Duran is a 79 y.o. female  PCP: Dereje John MD  Referring Provider: No ref. provider found    HPI  Patient presents to the office today with the following complaints: Follow-up      Patient seen in the office today for hospital follow up   She presented to the ER 1/7/2025 due to altered mental status   She has a history of liver cirrhosis  Labs revealed:  Ammonia 89  Bilirubin 2.1  ALT 16  AST 37  Alkaline Phosphatase 93    DX:Hepatic encephalopathy   Family reports her confusion had been going on for about 1 month leading up to hospital visit    Upon discharge she was started on furosemide and lactulose discussed, I discussed starting Xifaxan with her due to her hepatic encephalopathy and she is agreeable to this    She is requesting a dietician referral to help her with food choices in regards to her low sodium diet and diabetic diet     Today she states she is feeling better and she has not had any more confusion since discharge   We discussed the reason for the lactulose and she verbalizes understanding  We also discussed scheduling and EGD due to her liver disease to assess for any signs of increased pressure in her stomach or esophagus such as portal hypertensive gastropathy or esophageal varices   She voices understanding and wants to schedule her EGD    She denies any needs today     MELD 13  Assessment:     1. Other cirrhosis of liver (HCC)  -     US ORGAN ELASTOGRAPHY; Future  2. Hepatic encephalopathy (HCC)  3. Hospital discharge follow-up       Review of Systems   Constitutional:  Negative for activity change, appetite change, fatigue, fever and unexpected weight change.   HENT:  Negative for trouble swallowing.    Respiratory:  Negative for cough, choking and shortness of breath.    Cardiovascular:  Negative for chest pain.   Gastrointestinal:  Negative for abdominal distention, abdominal pain, anal bleeding, blood in stool, constipation, diarrhea, nausea,

## 2025-01-22 ASSESSMENT — ENCOUNTER SYMPTOMS
ABDOMINAL DISTENTION: 0
VOMITING: 0
BLOOD IN STOOL: 0
ABDOMINAL PAIN: 0
COUGH: 0
NAUSEA: 0
TROUBLE SWALLOWING: 0
ANAL BLEEDING: 0
RECTAL PAIN: 0
SHORTNESS OF BREATH: 0
CONSTIPATION: 0
CHOKING: 0
DIARRHEA: 0

## 2025-01-27 ENCOUNTER — TELEPHONE (OUTPATIENT)
Dept: GASTROENTEROLOGY | Age: 80
End: 2025-01-27

## 2025-01-27 NOTE — TELEPHONE ENCOUNTER
----- Message from LINDA PEREZ MA sent at 1/20/2025  1:53 PM CST -----  Regarding: RE: checkout note 1/17/25    ----- Message -----  From: Karla Haro  Sent: 1/17/2025   4:30 PM CST  To: Katherine Jimenez MA  Subject: checkout note 1/17/25                            Dietician referral

## 2025-01-27 NOTE — TELEPHONE ENCOUNTER
1.27.25  Referral notes have been faxed to Dorota Enriquez  [phone]  Fax confirmation scanned in media and attached to this encounter.

## 2025-01-31 NOTE — TELEPHONE ENCOUNTER
1-31-25  Called Dorota Enriquez [phone]  Records received and she called pt on  1-30-25 and left  for a call back to sched.    I did call pt and LVM for her to call Dorota to sched or call us back if she did not want that referral anymore.

## 2025-02-03 RX ORDER — ERGOCALCIFEROL 1.25 MG/1
50000 CAPSULE, LIQUID FILLED ORAL WEEKLY
Qty: 12 CAPSULE | Refills: 1 | Status: SHIPPED | OUTPATIENT
Start: 2025-02-03

## 2025-02-03 NOTE — TELEPHONE ENCOUNTER
Last OV 1/16/2025  Next OV 4/21/2025      Requested Prescriptions     Pending Prescriptions Disp Refills    vitamin D (ERGOCALCIFEROL) 1.25 MG (81531 UT) CAPS capsule 12 capsule 1     Sig: Take 1 capsule by mouth Once a week at 5 PM

## 2025-02-05 ENCOUNTER — TELEPHONE (OUTPATIENT)
Dept: GASTROENTEROLOGY | Age: 80
End: 2025-02-05

## 2025-02-05 NOTE — TELEPHONE ENCOUNTER
02- Received notification from [facility] that Xifaxan is not on their formulary.        Sent in Urgent Expedited Appeal due to recent Hospitalization for HE      Routed to CT APRN

## 2025-02-06 NOTE — TELEPHONE ENCOUNTER
02- Received Appeal from [facility] an partial approval for Xifaxan 550mg approved til 12--declined for lower cost tier exception due to no other drugs in tier to treat condition.              Called [facility] Spoke with [facility]   Filled on the 19th of last month at [facility]

## 2025-02-11 ENCOUNTER — OFFICE VISIT (OUTPATIENT)
Dept: INTERNAL MEDICINE | Age: 80
End: 2025-02-11

## 2025-02-11 VITALS
HEIGHT: 65 IN | HEART RATE: 84 BPM | OXYGEN SATURATION: 98 % | TEMPERATURE: 99.8 F | BODY MASS INDEX: 37.99 KG/M2 | WEIGHT: 228 LBS

## 2025-02-11 DIAGNOSIS — B49 FUNGUS DISEASE: Primary | ICD-10-CM

## 2025-02-11 DIAGNOSIS — K76.82 HEPATIC ENCEPHALOPATHY (HCC): ICD-10-CM

## 2025-02-11 DIAGNOSIS — K75.81 NASH (NONALCOHOLIC STEATOHEPATITIS): ICD-10-CM

## 2025-02-11 DIAGNOSIS — R05.1 ACUTE COUGH: ICD-10-CM

## 2025-02-11 RX ORDER — METHYLPREDNISOLONE ACETATE 80 MG/ML
80 INJECTION, SUSPENSION INTRA-ARTICULAR; INTRALESIONAL; INTRAMUSCULAR; SOFT TISSUE ONCE
Status: COMPLETED | OUTPATIENT
Start: 2025-02-11 | End: 2025-02-11

## 2025-02-11 RX ORDER — BENZONATATE 100 MG/1
100 CAPSULE ORAL 3 TIMES DAILY PRN
Qty: 21 CAPSULE | Refills: 0 | Status: SHIPPED | OUTPATIENT
Start: 2025-02-11 | End: 2025-02-18

## 2025-02-11 RX ADMIN — METHYLPREDNISOLONE ACETATE 80 MG: 80 INJECTION, SUSPENSION INTRA-ARTICULAR; INTRALESIONAL; INTRAMUSCULAR; SOFT TISSUE at 16:18

## 2025-02-11 ASSESSMENT — ENCOUNTER SYMPTOMS
BACK PAIN: 0
SINUS PRESSURE: 1
WHEEZING: 0
EYE DISCHARGE: 0
ABDOMINAL PAIN: 0
ABDOMINAL DISTENTION: 0
VOMITING: 0
NAUSEA: 0
SHORTNESS OF BREATH: 0
COUGH: 1
EYE ITCHING: 0
SORE THROAT: 0
BLOOD IN STOOL: 0
TROUBLE SWALLOWING: 0

## 2025-02-11 NOTE — PROGRESS NOTES
Medication Sig Dispense Refill    vitamin D (ERGOCALCIFEROL) 1.25 MG (67666 UT) CAPS capsule Take 1 capsule by mouth Once a week at 5 PM 12 capsule 1    rifAXIMin (XIFAXAN) 550 MG tablet Take 1 tablet by mouth 2 times daily 60 tablet 11    levothyroxine (SYNTHROID) 200 MCG tablet Take 1 tablet by mouth daily 30 tablet 3    furosemide (LASIX) 20 MG tablet Take 1 tablet by mouth daily 90 tablet 1    lactulose (CHRONULAC) 10 GM/15ML solution Take 30 mLs by mouth 3 times daily 2700 mL 0    escitalopram (LEXAPRO) 20 MG tablet TAKE 1 TABLET BY MOUTH EVERY DAY EVERY MORNING      traZODone (DESYREL) 50 MG tablet TAKE 1 TABLET BY MOUTH EVERY DAY EVERY EVENING       No current facility-administered medications for this visit.     No Known Allergies      Subjective:     Review of Systems   Constitutional:  Negative for activity change, appetite change, fatigue and fever.   HENT:  Positive for congestion, postnasal drip and sinus pressure. Negative for hearing loss, sore throat and trouble swallowing.    Eyes:  Negative for discharge and itching.   Respiratory:  Positive for cough. Negative for shortness of breath and wheezing.    Cardiovascular:  Negative for chest pain, palpitations and leg swelling.   Gastrointestinal:  Negative for abdominal distention, abdominal pain, blood in stool, nausea and vomiting.   Endocrine: Negative for cold intolerance, heat intolerance and polydipsia.   Genitourinary:  Negative for flank pain, frequency, hematuria and urgency.   Musculoskeletal:  Negative for arthralgias, back pain and joint swelling.   Skin:  Negative for rash and wound.   Allergic/Immunologic: Negative for environmental allergies and food allergies.   Neurological:  Negative for dizziness, tremors, syncope, weakness, numbness and headaches.   Hematological:  Negative for adenopathy.   Psychiatric/Behavioral:  Negative for agitation and hallucinations. The patient is not nervous/anxious.        Objective:      Pulse 84

## 2025-02-17 ENCOUNTER — TELEPHONE (OUTPATIENT)
Dept: GASTROENTEROLOGY | Age: 80
End: 2025-02-17

## 2025-02-17 NOTE — TELEPHONE ENCOUNTER
2-17-25    [facility] pt assistance forms filled out and faxed to .    Xifaxan cost for pt out of pocket is  $4157.99    Fax confirmation scanned in media and attached to this encounter.     Health Maintenance Due   Topic Date Due   • Meningococcal Vaccine (2 - 2-dose series) 09/13/2017       Patient is due for topics as listed above but is not proceeding with Immunization(s) Meningococcal at this time.

## 2025-03-14 ENCOUNTER — HOSPITAL ENCOUNTER (OUTPATIENT)
Age: 80
Setting detail: OUTPATIENT SURGERY
Discharge: HOME | End: 2025-03-14
Attending: INTERNAL MEDICINE | Admitting: INTERNAL MEDICINE
Payer: MEDICARE

## 2025-03-14 ENCOUNTER — ANESTHESIA EVENT (OUTPATIENT)
Dept: ENDOSCOPY | Age: 80
End: 2025-03-14
Payer: MEDICARE

## 2025-03-14 ENCOUNTER — ANCILLARY PROCEDURE (OUTPATIENT)
Dept: ENDOSCOPY | Age: 80
End: 2025-03-14
Attending: INTERNAL MEDICINE
Payer: MEDICARE

## 2025-03-14 ENCOUNTER — ANESTHESIA (OUTPATIENT)
Dept: ENDOSCOPY | Age: 80
End: 2025-03-14
Payer: MEDICARE

## 2025-03-14 VITALS
HEART RATE: 76 BPM | HEIGHT: 65 IN | WEIGHT: 228 LBS | OXYGEN SATURATION: 100 % | SYSTOLIC BLOOD PRESSURE: 160 MMHG | DIASTOLIC BLOOD PRESSURE: 76 MMHG | RESPIRATION RATE: 18 BRPM | BODY MASS INDEX: 37.99 KG/M2 | TEMPERATURE: 97.6 F

## 2025-03-14 LAB
GLUCOSE BLD-MCNC: 110 MG/DL (ref 70–99)
PERFORMED ON: ABNORMAL

## 2025-03-14 PROCEDURE — 43235 EGD DIAGNOSTIC BRUSH WASH: CPT | Performed by: INTERNAL MEDICINE

## 2025-03-14 PROCEDURE — 7100000011 HC PHASE II RECOVERY - ADDTL 15 MIN: Performed by: INTERNAL MEDICINE

## 2025-03-14 PROCEDURE — 82962 GLUCOSE BLOOD TEST: CPT

## 2025-03-14 PROCEDURE — 3700000000 HC ANESTHESIA ATTENDED CARE: Performed by: INTERNAL MEDICINE

## 2025-03-14 PROCEDURE — 6360000002 HC RX W HCPCS

## 2025-03-14 PROCEDURE — 7100000010 HC PHASE II RECOVERY - FIRST 15 MIN: Performed by: INTERNAL MEDICINE

## 2025-03-14 PROCEDURE — 2709999900 HC NON-CHARGEABLE SUPPLY: Performed by: INTERNAL MEDICINE

## 2025-03-14 PROCEDURE — 3609012400 HC EGD TRANSORAL BIOPSY SINGLE/MULTIPLE: Performed by: INTERNAL MEDICINE

## 2025-03-14 PROCEDURE — 2580000003 HC RX 258: Performed by: INTERNAL MEDICINE

## 2025-03-14 RX ORDER — NADOLOL 20 MG/1
20 TABLET ORAL DAILY
Qty: 30 TABLET | Refills: 11 | Status: SHIPPED | OUTPATIENT
Start: 2025-03-14

## 2025-03-14 RX ORDER — PROPOFOL 10 MG/ML
INJECTION, EMULSION INTRAVENOUS
Status: DISCONTINUED | OUTPATIENT
Start: 2025-03-14 | End: 2025-03-14 | Stop reason: SDUPTHER

## 2025-03-14 RX ORDER — SODIUM CHLORIDE, SODIUM LACTATE, POTASSIUM CHLORIDE, CALCIUM CHLORIDE 600; 310; 30; 20 MG/100ML; MG/100ML; MG/100ML; MG/100ML
INJECTION, SOLUTION INTRAVENOUS CONTINUOUS
Status: DISCONTINUED | OUTPATIENT
Start: 2025-03-14 | End: 2025-03-14 | Stop reason: HOSPADM

## 2025-03-14 RX ADMIN — PROPOFOL 50 MG: 10 INJECTION, EMULSION INTRAVENOUS at 12:20

## 2025-03-14 RX ADMIN — PROPOFOL 100 MG: 10 INJECTION, EMULSION INTRAVENOUS at 12:19

## 2025-03-14 RX ADMIN — SODIUM CHLORIDE, SODIUM LACTATE, POTASSIUM CHLORIDE, AND CALCIUM CHLORIDE: .6; .31; .03; .02 INJECTION, SOLUTION INTRAVENOUS at 10:43

## 2025-03-14 ASSESSMENT — PAIN - FUNCTIONAL ASSESSMENT
PAIN_FUNCTIONAL_ASSESSMENT: 0-10
PAIN_FUNCTIONAL_ASSESSMENT: NONE - DENIES PAIN

## 2025-03-14 NOTE — ANESTHESIA PRE PROCEDURE
Department of Anesthesiology  Preprocedure Note       Name:  Yuli Duran   Age:  79 y.o.  :  1945                                          MRN:  [id number]         Date:  3/14/2025      Surgeon: Surgeon(s):  Abdirashid Sandoval MD    Procedure: Procedure(s):  ESOPHAGOGASTRODUODENOSCOPY BIOPSY    Medications prior to admission:   Prior to Admission medications    Medication Sig Start Date End Date Taking? Authorizing Provider   rifAXIMin (XIFAXAN) 550 MG tablet Take 1 tablet by mouth 2 times daily 25  Yes Cristiano Walker APRN   levothyroxine (SYNTHROID) 200 MCG tablet Take 1 tablet by mouth daily 25  Yes Dereje John MD   furosemide (LASIX) 20 MG tablet Take 1 tablet by mouth daily 25  Yes Scottie Norris MD   escitalopram (LEXAPRO) 20 MG tablet TAKE 1 TABLET BY MOUTH EVERY DAY EVERY MORNING 23  Yes ProviderZeynep MD   traZODone (DESYREL) 50 MG tablet TAKE 1 TABLET BY MOUTH EVERY DAY EVERY EVENING 23  Yes Provider, MD Zeynep   vitamin D (ERGOCALCIFEROL) 1.25 MG (85724 UT) CAPS capsule Take 1 capsule by mouth Once a week at 5 PM 2/3/25   Dereje John MD       Current medications:    Current Facility-Administered Medications   Medication Dose Route Frequency Provider Last Rate Last Admin    lactated ringers infusion   IntraVENous Continuous Abdirashid Sandoval  mL/hr at 25 1043 New Bag at 25 1043       Allergies:  No Known Allergies    Problem List:    Patient Active Problem List   Diagnosis Code    MILLER (nonalcoholic steatohepatitis) K75.81    Controlled type 2 diabetes mellitus without complication, without long-term current use of insulin (HCC) E11.9    Primary hypertension I10    Other specified hypothyroidism E03.8    Insomnia G47.00    Severe obesity (BMI 35.0-39.9) with comorbidity E66.01    Localized edema R60.0    Thrombocytopenia D69.6    Weight gain R63.5    Closed fracture of nasal bones S02.2XXA    Slow transit constipation

## 2025-03-14 NOTE — DISCHARGE INSTRUCTIONS
RECOMMENDATIONS:    1.  Start Nadolol at 20mg daily. Schedule f/u OV in 4 weeks to titrate up as tolerated.   2.  Repeat EGD in maximum of 1 yr for variceal screening    Upper GI Endoscopy: What to Expect at Home  Your Recovery  You had an upper GI endoscopy. Your doctor used a thin, lighted tube that bends to look at the inside of your esophagus, your stomach, and the first part of the small intestine, called the duodenum.    How can you care for yourself at home?  Activity   Rest as much as you need to after you go home.  You should be able to go back to your usual activities the day after the test.  Due to anesthesia, no driving or operating equipment for 24 hours.  Diet   Follow your doctor's directions for eating after the test.  Drink plenty of fluids (unless your doctor has told you not to).  Medications   If you have a sore throat the day after the test, use an over-the-counter spray to numb your throat.  When should you call for help?   Call 911 anytime you think you may need emergency care. For example, call if:    You passed out (lost consciousness).     You have trouble breathing.     You pass maroon or bloody stools.   Call your doctor now or seek immediate medical care if:    You have pain that does not get better after your take pain medicine.     You have new or worse belly pain.     You have blood in your stools.     You are sick to your stomach and cannot keep fluids down.     You have a fever.     You cannot pass stools or gas.   Watch closely for changes in your health, and be sure to contact your doctor if:    Your throat still hurts after a day or two.     You do not get better as expected.

## 2025-03-14 NOTE — H&P
Patient Name: Yuli Duran  : 1945  MRN: [id number]  DATE: 25    Allergies: No Known Allergies     ENDOSCOPY  History and Physical    Procedure:    [] Diagnostic Colonoscopy       [] Screening Colonoscopy  [x] EGD      [] ERCP      [] EUS       [] Other    [x] Previous office notes/History and Physical reviewed from the patients chart. Please see EMR for further details of HPI. I have examined the patient's status immediately prior to the procedure and:      Indications/HPI:    []Abdominal Pain   []Barretts  []Screening/Surveillance   []History of Polyps  []Dysphagia            [] +Cologard/DNA testing  []Abnormal Imaging              []EOE Hx              [] Family Hx of CRC/Polyps  []Anemia                            []Food Impaction       []Recent Poor Prep  []GI Bleed             []Lymphadenopathy  []History of Polyps  []Change in bowel habits []Heartburn/Reflux  []Cancer- GI/Lung  []Chest Pain - Non Cardiac []Heme (+) Stool []Ulcers  []Constipation  []Hemoptysis  []Incontinence    []Diarrhea  []Hypoxemia  []Rectal Bleed (BRBPR)  []Nausea/Vomiting   [x] Varices  []Crohns/Colitis  []Pancreatic Cyst   [x] Cirrhosis   []Pancreatitis    []Abnormal MRCP  []Elevated LFT [] Stent Removal, Previous ERCP  []Other:     Anesthesia:   [x] MAC [] Moderate Sedation   [] General   [] None     ROS: 12 pt Review of Symptoms was negative unless mentioned above    Medications:   Prior to Admission medications    Medication Sig Start Date End Date Taking? Authorizing Provider   rifAXIMin (XIFAXAN) 550 MG tablet Take 1 tablet by mouth 2 times daily 25  Yes Cristiano Walker, KATIA   levothyroxine (SYNTHROID) 200 MCG tablet Take 1 tablet by mouth daily 25  Yes Dereje John MD   furosemide (LASIX) 20 MG tablet Take 1 tablet by mouth daily 25  Yes Scottie Norris MD   escitalopram (LEXAPRO) 20 MG tablet TAKE 1 TABLET BY MOUTH EVERY DAY EVERY MORNING 23  Yes Provider, Historical,  procedure as above.         Abdirashid Sandoval MD

## 2025-03-14 NOTE — ANESTHESIA POSTPROCEDURE EVALUATION
Department of Anesthesiology  Postprocedure Note    Patient: Yuli Duran  MRN: [id number]  YOB: 1945  Date of evaluation: 3/14/2025    Procedure Summary       Date: 03/14/25 Room / Location: [place] [address] / [facility]    Anesthesia Start: 1217 Anesthesia Stop: 1227    Procedure: ESOPHAGOGASTRODUODENOSCOPY BIOPSY Diagnosis:       Hepatic cirrhosis, unspecified hepatic cirrhosis type, unspecified whether ascites present (HCC)      Gastroesophageal reflux disease, unspecified whether esophagitis present      (Hepatic cirrhosis, unspecified hepatic cirrhosis type, unspecified whether ascites present (HCC) [K74.60])      (Gastroesophageal reflux disease, unspecified whether esophagitis present [K21.9])    Surgeons: Abdirashid Sandoval MD Responsible Provider: Eduar Lundberg APRN - CRNA    Anesthesia Type: general, TIVA ASA Status: 3            Anesthesia Type: No value filed.    Ninfa Phase I: Ninfa Score: 10    Ninfa Phase II:      Anesthesia Post Evaluation    Patient location during evaluation: bedside  Patient participation: complete - patient participated  Level of consciousness: sleepy but conscious  Pain score: 0  Airway patency: patent  Nausea & Vomiting: no vomiting  Cardiovascular status: blood pressure returned to baseline and hemodynamically stable  Respiratory status: acceptable and nasal cannula  Hydration status: stable  Comments: Blood pressure 132/63, pulse 84, temperature 97 °F (36.1 °C), temperature source Temporal, resp. rate 18, height 1.651 m (5' 5\"), weight 103.4 kg (228 lb), SpO2 97%.    Pain management: adequate    No notable events documented.

## 2025-04-11 ENCOUNTER — APPOINTMENT (OUTPATIENT)
Dept: ULTRASOUND IMAGING | Age: 80
End: 2025-04-11
Payer: MEDICARE

## 2025-04-11 ENCOUNTER — OFFICE VISIT (OUTPATIENT)
Dept: GASTROENTEROLOGY | Age: 80
End: 2025-04-11
Payer: MEDICARE

## 2025-04-11 ENCOUNTER — HOSPITAL ENCOUNTER (EMERGENCY)
Age: 80
Discharge: HOME | End: 2025-04-11
Payer: MEDICARE

## 2025-04-11 VITALS
DIASTOLIC BLOOD PRESSURE: 70 MMHG | WEIGHT: 260 LBS | BODY MASS INDEX: 43.32 KG/M2 | HEART RATE: 88 BPM | HEIGHT: 65 IN | OXYGEN SATURATION: 96 % | SYSTOLIC BLOOD PRESSURE: 122 MMHG

## 2025-04-11 VITALS
SYSTOLIC BLOOD PRESSURE: 108 MMHG | TEMPERATURE: 98 F | OXYGEN SATURATION: 95 % | BODY MASS INDEX: 43.49 KG/M2 | DIASTOLIC BLOOD PRESSURE: 68 MMHG | HEIGHT: 65 IN | RESPIRATION RATE: 18 BRPM | HEART RATE: 64 BPM | WEIGHT: 261 LBS

## 2025-04-11 DIAGNOSIS — K74.69 OTHER CIRRHOSIS OF LIVER: ICD-10-CM

## 2025-04-11 DIAGNOSIS — I86.4 GASTRIC VARICES: ICD-10-CM

## 2025-04-11 DIAGNOSIS — K74.69 OTHER CIRRHOSIS OF LIVER: Primary | ICD-10-CM

## 2025-04-11 DIAGNOSIS — K76.82 HEPATIC ENCEPHALOPATHY (HCC): ICD-10-CM

## 2025-04-11 DIAGNOSIS — R60.0 LOCALIZED EDEMA: ICD-10-CM

## 2025-04-11 DIAGNOSIS — E03.8 OTHER SPECIFIED HYPOTHYROIDISM: ICD-10-CM

## 2025-04-11 DIAGNOSIS — I85.10 SECONDARY ESOPHAGEAL VARICES WITHOUT BLEEDING (HCC): ICD-10-CM

## 2025-04-11 DIAGNOSIS — I10 PRIMARY HYPERTENSION: ICD-10-CM

## 2025-04-11 DIAGNOSIS — R60.9 EDEMA, UNSPECIFIED TYPE: Primary | ICD-10-CM

## 2025-04-11 DIAGNOSIS — L03.90 CHRONIC CELLULITIS: ICD-10-CM

## 2025-04-11 LAB
25(OH)D3 SERPL-MCNC: 44.8 NG/ML
AFP SERPL-MCNC: 2.4 NG/ML (ref 0–8.3)
ALBUMIN SERPL-MCNC: 3.3 G/DL (ref 3.5–5.2)
ALBUMIN SERPL-MCNC: 3.4 G/DL (ref 3.5–5.2)
ALP SERPL-CCNC: 160 U/L (ref 35–104)
ALP SERPL-CCNC: 163 U/L (ref 35–104)
ALT SERPL-CCNC: 31 U/L (ref 10–35)
ALT SERPL-CCNC: 32 U/L (ref 10–35)
AMMONIA PLAS-SCNC: 115 UMOL/L (ref 11–51)
ANION GAP SERPL CALCULATED.3IONS-SCNC: 7 MMOL/L (ref 8–16)
ANION GAP SERPL CALCULATED.3IONS-SCNC: 7 MMOL/L (ref 8–16)
AST SERPL-CCNC: 53 U/L (ref 10–35)
AST SERPL-CCNC: 54 U/L (ref 10–35)
BASOPHILS # BLD: 0.1 K/UL (ref 0–0.2)
BASOPHILS NFR BLD: 0.8 % (ref 0–1)
BILIRUB DIRECT SERPL-MCNC: 0.8 MG/DL (ref 0–0.3)
BILIRUB INDIRECT SERPL-MCNC: 1 MG/DL (ref 0–1)
BILIRUB SERPL-MCNC: 1.7 MG/DL (ref 0.2–1.2)
BILIRUB SERPL-MCNC: 1.8 MG/DL (ref 0.2–1.2)
BUN SERPL-MCNC: 16 MG/DL (ref 8–23)
BUN SERPL-MCNC: 16 MG/DL (ref 8–23)
CALCIUM SERPL-MCNC: 8.9 MG/DL (ref 8.8–10.2)
CALCIUM SERPL-MCNC: 9 MG/DL (ref 8.8–10.2)
CHLORIDE SERPL-SCNC: 105 MMOL/L (ref 98–107)
CHLORIDE SERPL-SCNC: 105 MMOL/L (ref 98–107)
CHOLEST SERPL-MCNC: 154 MG/DL (ref 0–199)
CO2 SERPL-SCNC: 29 MMOL/L (ref 22–29)
CO2 SERPL-SCNC: 29 MMOL/L (ref 22–29)
CREAT SERPL-MCNC: 0.9 MG/DL (ref 0.5–0.9)
CREAT SERPL-MCNC: 0.9 MG/DL (ref 0.5–0.9)
EOSINOPHIL # BLD: 0.3 K/UL (ref 0–0.6)
EOSINOPHIL NFR BLD: 5.4 % (ref 0–5)
ERYTHROCYTE [DISTWIDTH] IN BLOOD BY AUTOMATED COUNT: 15.8 % (ref 11.5–14.5)
GLUCOSE SERPL-MCNC: 98 MG/DL (ref 70–99)
GLUCOSE SERPL-MCNC: 99 MG/DL (ref 70–99)
HBA1C MFR BLD: 5.1 % (ref 4–5.6)
HCT VFR BLD AUTO: 36.7 % (ref 37–47)
HDLC SERPL-MCNC: 72 MG/DL (ref 40–60)
HGB BLD-MCNC: 12.1 G/DL (ref 12–16)
IMM GRANULOCYTES # BLD: 0 K/UL
INR PPP: 1.44 (ref 0.88–1.18)
LDLC SERPL CALC-MCNC: 70 MG/DL
LYMPHOCYTES # BLD: 1.4 K/UL (ref 1.1–4.5)
LYMPHOCYTES NFR BLD: 23.8 % (ref 20–40)
MCH RBC QN AUTO: 32.2 PG (ref 27–31)
MCHC RBC AUTO-ENTMCNC: 33 G/DL (ref 33–37)
MCV RBC AUTO: 97.6 FL (ref 81–99)
MONOCYTES # BLD: 0.4 K/UL (ref 0–0.9)
MONOCYTES NFR BLD: 7.1 % (ref 0–10)
NEUTROPHILS # BLD: 3.7 K/UL (ref 1.5–7.5)
NEUTS SEG NFR BLD: 62.6 % (ref 50–65)
PLATELET # BLD AUTO: 95 K/UL (ref 130–400)
PMV BLD AUTO: 10.3 FL (ref 9.4–12.3)
POTASSIUM SERPL-SCNC: 3.9 MMOL/L (ref 3.5–5.1)
POTASSIUM SERPL-SCNC: 4 MMOL/L (ref 3.5–5.1)
PROT SERPL-MCNC: 6.6 G/DL (ref 6.4–8.3)
PROT SERPL-MCNC: 6.7 G/DL (ref 6.4–8.3)
PROTHROMBIN TIME: 17.3 SEC (ref 12–14.6)
RBC # BLD AUTO: 3.76 M/UL (ref 4.2–5.4)
SODIUM SERPL-SCNC: 141 MMOL/L (ref 136–145)
SODIUM SERPL-SCNC: 141 MMOL/L (ref 136–145)
T4 FREE SERPL-MCNC: 2.11 NG/DL (ref 0.93–1.7)
TRIGL SERPL-MCNC: 59 MG/DL (ref 0–149)
TSH SERPL DL<=0.005 MIU/L-ACNC: 0.1 UIU/ML (ref 0.27–4.2)
WBC # BLD AUTO: 5.9 K/UL (ref 4.8–10.8)

## 2025-04-11 PROCEDURE — 3074F SYST BP LT 130 MM HG: CPT | Performed by: NURSE PRACTITIONER

## 2025-04-11 PROCEDURE — G8417 CALC BMI ABV UP PARAM F/U: HCPCS | Performed by: NURSE PRACTITIONER

## 2025-04-11 PROCEDURE — G8427 DOCREV CUR MEDS BY ELIG CLIN: HCPCS | Performed by: NURSE PRACTITIONER

## 2025-04-11 PROCEDURE — 3078F DIAST BP <80 MM HG: CPT | Performed by: NURSE PRACTITIONER

## 2025-04-11 PROCEDURE — 1036F TOBACCO NON-USER: CPT | Performed by: NURSE PRACTITIONER

## 2025-04-11 PROCEDURE — 99214 OFFICE O/P EST MOD 30 MIN: CPT | Performed by: NURSE PRACTITIONER

## 2025-04-11 PROCEDURE — 1090F PRES/ABSN URINE INCON ASSESS: CPT | Performed by: NURSE PRACTITIONER

## 2025-04-11 PROCEDURE — 1124F ACP DISCUSS-NO DSCNMKR DOCD: CPT | Performed by: NURSE PRACTITIONER

## 2025-04-11 PROCEDURE — G8399 PT W/DXA RESULTS DOCUMENT: HCPCS | Performed by: NURSE PRACTITIONER

## 2025-04-11 PROCEDURE — 76705 ECHO EXAM OF ABDOMEN: CPT

## 2025-04-11 PROCEDURE — 99284 EMERGENCY DEPT VISIT MOD MDM: CPT

## 2025-04-11 RX ORDER — NADOLOL 40 MG/1
40 TABLET ORAL DAILY
Qty: 30 TABLET | Refills: 5 | Status: SHIPPED | OUTPATIENT
Start: 2025-04-11

## 2025-04-11 RX ORDER — SPIRONOLACTONE 25 MG/1
25 TABLET ORAL DAILY
Qty: 30 TABLET | Refills: 3 | Status: SHIPPED | OUTPATIENT
Start: 2025-04-11

## 2025-04-11 RX ORDER — FUROSEMIDE 40 MG/1
40 TABLET ORAL DAILY
Qty: 30 TABLET | Refills: 11 | Status: SHIPPED | OUTPATIENT
Start: 2025-04-11

## 2025-04-11 RX ORDER — LACTULOSE 10 G/15ML
20 SOLUTION ORAL 2 TIMES DAILY
COMMUNITY

## 2025-04-11 ASSESSMENT — ENCOUNTER SYMPTOMS
TROUBLE SWALLOWING: 0
CHOKING: 0
RESPIRATORY NEGATIVE: 1
BLOOD IN STOOL: 0
NAUSEA: 0
ANAL BLEEDING: 0
DIARRHEA: 0
ABDOMINAL DISTENTION: 1
CONSTIPATION: 0
COUGH: 0
ABDOMINAL PAIN: 0
SHORTNESS OF BREATH: 0
VOMITING: 0
RECTAL PAIN: 0
ABDOMINAL PAIN: 0
ABDOMINAL DISTENTION: 1

## 2025-04-11 NOTE — DISCHARGE INSTRUCTIONS
Start the water bill today as prescribed.  Follow up with Tiffany MONTALVO as scheduled.  Return to ER for any new, worsening, or change in condition.

## 2025-04-11 NOTE — ED PROVIDER NOTES
[facility] EMERGENCY DEPARTMENT  EMERGENCY DEPARTMENT ENCOUNTER      Pt Name: Yuli Duran  MRN: [id number]  Birthdate 1945  Date of evaluation: 4/11/2025  Provider: KATIA Vasquez NP    CHIEF COMPLAINT       Chief Complaint   Patient presents with    Bloated     Sent by GI for paracentesis          HISTORY OF PRESENT ILLNESS   (Location/Symptom, Timing/Onset,Context/Setting, Quality, Duration, Modifying Factors, Severity)  Note limiting factors.   Yuli Duran is a 79 y.o. female who presents to the emergency department from her gastro enterology office with report that she is here to have paracentesis.  Patient's daughter states that she was told to go directly to the ER and that an order has been placed for the procedure.  Patient has no other complaints aside from feeling bloated and short of breath.        The history is provided by the patient and a relative.       NursingNotes were reviewed.    REVIEW OF SYSTEMS    (2-9 systems for level 4, 10 or more for level 5)     Review of Systems   Constitutional:         As per HPI   HENT: Negative.     Respiratory: Negative.     Cardiovascular: Negative.    Gastrointestinal:  Positive for abdominal distention. Negative for abdominal pain.        Feels bloated   All other systems reviewed and are negative.      A complete review of systems was performed and is negative except as noted above in the HPI.       PAST MEDICAL HISTORY     Past Medical History:   Diagnosis Date    Anxiety     Bruising     Diabetes mellitus (HCC)     Edema     Fracture of nasal bone     History of cellulitis     History of constipation     medication induced    Hypertension     Non-alcoholic cirrhosis (HCC)     SDH (subdural hematoma) (HCC) 10/30/2021    small, post fall ([facility]    Sleep disorder     Splenomegaly     Thyroid disease          SURGICAL HISTORY       Past Surgical History:   Procedure Laterality Date    CHOLECYSTECTOMY  1978

## 2025-04-11 NOTE — PROGRESS NOTES
traZODone (DESYREL) 50 MG tablet TAKE 1 TABLET BY MOUTH EVERY DAY EVERY EVENING       No current facility-administered medications for this visit.       No Known Allergies        Objective:     /70   Pulse 88   Ht 1.651 m (5' 5\")   Wt 117.9 kg (260 lb)   SpO2 96%   BMI 43.27 kg/m²     Physical Exam  Vital Signs  Weight is 260 pounds.  Physical Exam  Vitals reviewed.   Constitutional:       General: She is not in acute distress.     Appearance: She is well-developed.   HENT:      Head: Normocephalic and atraumatic.      Right Ear: External ear normal.      Left Ear: External ear normal.      Nose: Nose normal.   Eyes:      General: No scleral icterus.        Right eye: No discharge.         Left eye: No discharge.      Conjunctiva/sclera: Conjunctivae normal.      Pupils: Pupils are equal, round, and reactive to light.   Cardiovascular:      Rate and Rhythm: Normal rate and regular rhythm.      Heart sounds: Normal heart sounds. No murmur heard.  Pulmonary:      Effort: Pulmonary effort is normal. No respiratory distress.      Breath sounds: Normal breath sounds. No wheezing or rales.   Abdominal:      General: Bowel sounds are normal. There is distension.      Palpations: Abdomen is soft. There is no mass.      Tenderness: There is no abdominal tenderness. There is no guarding or rebound.   Musculoskeletal:         General: Normal range of motion.      Cervical back: Normal range of motion and neck supple.      Right lower leg: Edema present.      Left lower leg: Edema present.   Skin:     General: Skin is warm and dry.      Coloration: Skin is not pale.   Neurological:      Mental Status: She is alert and oriented to person, place, and time.   Psychiatric:         Behavior: Behavior normal.

## 2025-04-16 ENCOUNTER — RESULTS FOLLOW-UP (OUTPATIENT)
Dept: GASTROENTEROLOGY | Age: 80
End: 2025-04-16

## 2025-04-16 NOTE — RESULT ENCOUNTER NOTE
Ammonia level is elvated, lier enzymes are elevated as well as PT/INR  AFP tumor marker is negative   Please instructed Helen to increase her lactulose

## 2025-04-17 DIAGNOSIS — D69.6 THROMBOCYTOPENIA: Primary | ICD-10-CM

## 2025-04-21 ENCOUNTER — APPOINTMENT (OUTPATIENT)
Dept: GENERAL RADIOLOGY | Age: 80
DRG: 286 | End: 2025-04-21
Payer: MEDICARE

## 2025-04-21 ENCOUNTER — OFFICE VISIT (OUTPATIENT)
Dept: INTERNAL MEDICINE | Age: 80
End: 2025-04-21
Payer: MEDICARE

## 2025-04-21 ENCOUNTER — APPOINTMENT (OUTPATIENT)
Dept: CT IMAGING | Age: 80
DRG: 286 | End: 2025-04-21
Payer: MEDICARE

## 2025-04-21 ENCOUNTER — HOSPITAL ENCOUNTER (INPATIENT)
Age: 80
LOS: 4 days | Discharge: HOME | DRG: 286 | End: 2025-04-25
Attending: HOSPITALIST | Admitting: INTERNAL MEDICINE
Payer: MEDICARE

## 2025-04-21 VITALS
OXYGEN SATURATION: 98 % | HEART RATE: 64 BPM | WEIGHT: 264 LBS | BODY MASS INDEX: 43.99 KG/M2 | SYSTOLIC BLOOD PRESSURE: 120 MMHG | DIASTOLIC BLOOD PRESSURE: 70 MMHG | HEIGHT: 65 IN

## 2025-04-21 DIAGNOSIS — R07.9 CHEST PAIN: ICD-10-CM

## 2025-04-21 DIAGNOSIS — I10 PRIMARY HYPERTENSION: ICD-10-CM

## 2025-04-21 DIAGNOSIS — K75.81 NASH (NONALCOHOLIC STEATOHEPATITIS): ICD-10-CM

## 2025-04-21 DIAGNOSIS — E55.9 VITAMIN D DEFICIENCY: ICD-10-CM

## 2025-04-21 DIAGNOSIS — E72.20 HYPERAMMONEMIA: ICD-10-CM

## 2025-04-21 DIAGNOSIS — K76.82 HEPATIC ENCEPHALOPATHY (HCC): ICD-10-CM

## 2025-04-21 DIAGNOSIS — R63.5 RAPID WEIGHT GAIN: ICD-10-CM

## 2025-04-21 DIAGNOSIS — R63.5 WEIGHT GAIN: ICD-10-CM

## 2025-04-21 DIAGNOSIS — K74.69 OTHER CIRRHOSIS OF LIVER (HCC): ICD-10-CM

## 2025-04-21 DIAGNOSIS — I50.20 HEART FAILURE WITH REDUCED EJECTION FRACTION (HCC): ICD-10-CM

## 2025-04-21 DIAGNOSIS — R06.02 SHORTNESS OF BREATH: ICD-10-CM

## 2025-04-21 DIAGNOSIS — I50.9 OTHER CONGESTIVE HEART FAILURE (HCC): ICD-10-CM

## 2025-04-21 DIAGNOSIS — E03.8 OTHER SPECIFIED HYPOTHYROIDISM: Primary | ICD-10-CM

## 2025-04-21 DIAGNOSIS — R41.82 ALTERED MENTAL STATUS, UNSPECIFIED ALTERED MENTAL STATUS TYPE: Primary | ICD-10-CM

## 2025-04-21 PROBLEM — E88.09 HYPOALBUMINEMIA: Status: ACTIVE | Noted: 2025-04-21

## 2025-04-21 PROBLEM — E07.9 THYROID DISEASE: Status: ACTIVE | Noted: 2022-04-11

## 2025-04-21 LAB
ALBUMIN SERPL-MCNC: 3.3 G/DL (ref 3.5–5.2)
ALP SERPL-CCNC: 143 U/L (ref 35–104)
ALT SERPL-CCNC: 25 U/L (ref 10–35)
AMMONIA PLAS-SCNC: 165 UMOL/L (ref 11–51)
ANION GAP SERPL CALCULATED.3IONS-SCNC: 9 MMOL/L (ref 8–16)
APTT PPP: 36.2 SEC (ref 26–36.2)
AST SERPL-CCNC: 46 U/L (ref 10–35)
BASOPHILS # BLD: 0.1 K/UL (ref 0–0.2)
BASOPHILS NFR BLD: 1 % (ref 0–1)
BILIRUB SERPL-MCNC: 1.8 MG/DL (ref 0.2–1.2)
BILIRUB UR QL STRIP: NEGATIVE
BNP BLD-MCNC: 1668 PG/ML (ref 0–449)
BUN SERPL-MCNC: 19 MG/DL (ref 8–23)
CALCIUM SERPL-MCNC: 9 MG/DL (ref 8.8–10.2)
CHLORIDE SERPL-SCNC: 104 MMOL/L (ref 98–107)
CLARITY UR: CLEAR
CO2 SERPL-SCNC: 27 MMOL/L (ref 22–29)
COLOR UR: YELLOW
CREAT SERPL-MCNC: 1 MG/DL (ref 0.5–0.9)
EOSINOPHIL # BLD: 0.3 K/UL (ref 0–0.6)
EOSINOPHIL NFR BLD: 5.1 % (ref 0–5)
ERYTHROCYTE [DISTWIDTH] IN BLOOD BY AUTOMATED COUNT: 16.2 % (ref 11.5–14.5)
GLUCOSE BLD-MCNC: 159 MG/DL (ref 70–99)
GLUCOSE SERPL-MCNC: 87 MG/DL (ref 70–99)
GLUCOSE UR STRIP.AUTO-MCNC: NEGATIVE MG/DL
HBA1C MFR BLD: 5.2 % (ref 4–5.6)
HCT VFR BLD AUTO: 37.4 % (ref 37–47)
HGB BLD-MCNC: 12.2 G/DL (ref 12–16)
HGB UR STRIP.AUTO-MCNC: NEGATIVE MG/L
IMM GRANULOCYTES # BLD: 0 K/UL
INR PPP: 1.45 (ref 0.88–1.18)
KETONES UR STRIP.AUTO-MCNC: NEGATIVE MG/DL
LEUKOCYTE ESTERASE UR QL STRIP.AUTO: NEGATIVE
LIPASE SERPL-CCNC: 73 U/L (ref 13–60)
LYMPHOCYTES # BLD: 1.5 K/UL (ref 1.1–4.5)
LYMPHOCYTES NFR BLD: 25.1 % (ref 20–40)
MAGNESIUM SERPL-MCNC: 1.8 MG/DL (ref 1.6–2.4)
MCH RBC QN AUTO: 32.4 PG (ref 27–31)
MCHC RBC AUTO-ENTMCNC: 32.6 G/DL (ref 33–37)
MCV RBC AUTO: 99.5 FL (ref 81–99)
MONOCYTES # BLD: 0.5 K/UL (ref 0–0.9)
MONOCYTES NFR BLD: 8.2 % (ref 0–10)
NEUTROPHILS # BLD: 3.6 K/UL (ref 1.5–7.5)
NEUTS SEG NFR BLD: 60.3 % (ref 50–65)
NITRITE UR QL STRIP.AUTO: NEGATIVE
PERFORMED ON: ABNORMAL
PH UR STRIP.AUTO: 7.5 [PH] (ref 5–8)
PLATELET # BLD AUTO: 95 K/UL (ref 130–400)
PMV BLD AUTO: 10.1 FL (ref 9.4–12.3)
POTASSIUM SERPL-SCNC: 3.5 MMOL/L (ref 3.5–5)
PROT SERPL-MCNC: 6.5 G/DL (ref 6.4–8.3)
PROT UR STRIP.AUTO-MCNC: NEGATIVE MG/DL
PROTHROMBIN TIME: 17.5 SEC (ref 12–14.6)
RBC # BLD AUTO: 3.76 M/UL (ref 4.2–5.4)
SODIUM SERPL-SCNC: 140 MMOL/L (ref 136–145)
SP GR UR STRIP.AUTO: 1.01 (ref 1–1.03)
T4 FREE SERPL-MCNC: 1.87 NG/DL (ref 0.93–1.7)
TROPONIN, HIGH SENSITIVITY: 22 NG/L (ref 0–14)
TSH SERPL DL<=0.005 MIU/L-ACNC: 0.11 UIU/ML (ref 0.27–4.2)
UROBILINOGEN UR STRIP.AUTO-MCNC: 1 E.U./DL
WBC # BLD AUTO: 5.9 K/UL (ref 4.8–10.8)

## 2025-04-21 PROCEDURE — 83690 ASSAY OF LIPASE: CPT

## 2025-04-21 PROCEDURE — 99285 EMERGENCY DEPT VISIT HI MDM: CPT

## 2025-04-21 PROCEDURE — 2500000003 HC RX 250 WO HCPCS: Performed by: HOSPITALIST

## 2025-04-21 PROCEDURE — 1090F PRES/ABSN URINE INCON ASSESS: CPT | Performed by: INTERNAL MEDICINE

## 2025-04-21 PROCEDURE — 36415 COLL VENOUS BLD VENIPUNCTURE: CPT

## 2025-04-21 PROCEDURE — 85025 COMPLETE CBC W/AUTO DIFF WBC: CPT

## 2025-04-21 PROCEDURE — 80053 COMPREHEN METABOLIC PANEL: CPT

## 2025-04-21 PROCEDURE — 84439 ASSAY OF FREE THYROXINE: CPT

## 2025-04-21 PROCEDURE — 85610 PROTHROMBIN TIME: CPT

## 2025-04-21 PROCEDURE — 71045 X-RAY EXAM CHEST 1 VIEW: CPT

## 2025-04-21 PROCEDURE — 83880 ASSAY OF NATRIURETIC PEPTIDE: CPT

## 2025-04-21 PROCEDURE — 1036F TOBACCO NON-USER: CPT | Performed by: INTERNAL MEDICINE

## 2025-04-21 PROCEDURE — 1124F ACP DISCUSS-NO DSCNMKR DOCD: CPT | Performed by: INTERNAL MEDICINE

## 2025-04-21 PROCEDURE — G8427 DOCREV CUR MEDS BY ELIG CLIN: HCPCS | Performed by: INTERNAL MEDICINE

## 2025-04-21 PROCEDURE — 82140 ASSAY OF AMMONIA: CPT

## 2025-04-21 PROCEDURE — 6370000000 HC RX 637 (ALT 250 FOR IP): Performed by: HOSPITALIST

## 2025-04-21 PROCEDURE — 3074F SYST BP LT 130 MM HG: CPT | Performed by: INTERNAL MEDICINE

## 2025-04-21 PROCEDURE — 1200000000 HC SEMI PRIVATE

## 2025-04-21 PROCEDURE — 99214 OFFICE O/P EST MOD 30 MIN: CPT | Performed by: INTERNAL MEDICINE

## 2025-04-21 PROCEDURE — 81003 URINALYSIS AUTO W/O SCOPE: CPT

## 2025-04-21 PROCEDURE — 85730 THROMBOPLASTIN TIME PARTIAL: CPT

## 2025-04-21 PROCEDURE — 83735 ASSAY OF MAGNESIUM: CPT

## 2025-04-21 PROCEDURE — 74177 CT ABD & PELVIS W/CONTRAST: CPT

## 2025-04-21 PROCEDURE — 82962 GLUCOSE BLOOD TEST: CPT

## 2025-04-21 PROCEDURE — 83036 HEMOGLOBIN GLYCOSYLATED A1C: CPT

## 2025-04-21 PROCEDURE — G8399 PT W/DXA RESULTS DOCUMENT: HCPCS | Performed by: INTERNAL MEDICINE

## 2025-04-21 PROCEDURE — 84484 ASSAY OF TROPONIN QUANT: CPT

## 2025-04-21 PROCEDURE — 6360000004 HC RX CONTRAST MEDICATION: Performed by: NURSE PRACTITIONER

## 2025-04-21 PROCEDURE — 84443 ASSAY THYROID STIM HORMONE: CPT

## 2025-04-21 PROCEDURE — G8417 CALC BMI ABV UP PARAM F/U: HCPCS | Performed by: INTERNAL MEDICINE

## 2025-04-21 PROCEDURE — 3078F DIAST BP <80 MM HG: CPT | Performed by: INTERNAL MEDICINE

## 2025-04-21 PROCEDURE — 6360000002 HC RX W HCPCS: Performed by: HOSPITALIST

## 2025-04-21 RX ORDER — IOPAMIDOL 755 MG/ML
90 INJECTION, SOLUTION INTRAVASCULAR
Status: COMPLETED | OUTPATIENT
Start: 2025-04-21 | End: 2025-04-21

## 2025-04-21 RX ORDER — SODIUM CHLORIDE 0.9 % (FLUSH) 0.9 %
5-40 SYRINGE (ML) INJECTION EVERY 12 HOURS SCHEDULED
Status: DISCONTINUED | OUTPATIENT
Start: 2025-04-21 | End: 2025-04-25 | Stop reason: HOSPADM

## 2025-04-21 RX ORDER — ESCITALOPRAM OXALATE 10 MG/1
20 TABLET ORAL DAILY
Status: DISCONTINUED | OUTPATIENT
Start: 2025-04-22 | End: 2025-04-25 | Stop reason: HOSPADM

## 2025-04-21 RX ORDER — ENOXAPARIN SODIUM 100 MG/ML
30 INJECTION SUBCUTANEOUS 2 TIMES DAILY
Status: DISCONTINUED | OUTPATIENT
Start: 2025-04-21 | End: 2025-04-25 | Stop reason: HOSPADM

## 2025-04-21 RX ORDER — VALSARTAN 80 MG/1
40 TABLET ORAL EVERY 12 HOURS SCHEDULED
Status: DISCONTINUED | OUTPATIENT
Start: 2025-04-21 | End: 2025-04-25 | Stop reason: HOSPADM

## 2025-04-21 RX ORDER — SODIUM CHLORIDE 0.9 % (FLUSH) 0.9 %
5-40 SYRINGE (ML) INJECTION PRN
Status: DISCONTINUED | OUTPATIENT
Start: 2025-04-21 | End: 2025-04-25 | Stop reason: HOSPADM

## 2025-04-21 RX ORDER — SPIRONOLACTONE 25 MG/1
25 TABLET ORAL DAILY
Status: DISCONTINUED | OUTPATIENT
Start: 2025-04-22 | End: 2025-04-25 | Stop reason: HOSPADM

## 2025-04-21 RX ORDER — TRAZODONE HYDROCHLORIDE 50 MG/1
50 TABLET ORAL NIGHTLY
Status: DISCONTINUED | OUTPATIENT
Start: 2025-04-21 | End: 2025-04-25 | Stop reason: HOSPADM

## 2025-04-21 RX ORDER — POLYETHYLENE GLYCOL 3350 17 G/17G
17 POWDER, FOR SOLUTION ORAL 2 TIMES DAILY PRN
Status: DISCONTINUED | OUTPATIENT
Start: 2025-04-21 | End: 2025-04-25 | Stop reason: HOSPADM

## 2025-04-21 RX ORDER — SODIUM CHLORIDE 9 MG/ML
INJECTION, SOLUTION INTRAVENOUS PRN
Status: DISCONTINUED | OUTPATIENT
Start: 2025-04-21 | End: 2025-04-25 | Stop reason: HOSPADM

## 2025-04-21 RX ORDER — ACETAMINOPHEN 500 MG
500 TABLET ORAL EVERY 4 HOURS PRN
Status: DISCONTINUED | OUTPATIENT
Start: 2025-04-21 | End: 2025-04-25 | Stop reason: HOSPADM

## 2025-04-21 RX ORDER — ONDANSETRON 4 MG/1
4 TABLET, ORALLY DISINTEGRATING ORAL EVERY 8 HOURS PRN
Status: DISCONTINUED | OUTPATIENT
Start: 2025-04-21 | End: 2025-04-25 | Stop reason: HOSPADM

## 2025-04-21 RX ORDER — MAGNESIUM SULFATE IN WATER 40 MG/ML
2000 INJECTION, SOLUTION INTRAVENOUS PRN
Status: DISCONTINUED | OUTPATIENT
Start: 2025-04-21 | End: 2025-04-25 | Stop reason: HOSPADM

## 2025-04-21 RX ORDER — NITROGLYCERIN 0.4 MG/1
0.4 TABLET SUBLINGUAL EVERY 5 MIN PRN
Status: DISCONTINUED | OUTPATIENT
Start: 2025-04-21 | End: 2025-04-25 | Stop reason: HOSPADM

## 2025-04-21 RX ORDER — ONDANSETRON 2 MG/ML
4 INJECTION INTRAMUSCULAR; INTRAVENOUS EVERY 6 HOURS PRN
Status: DISCONTINUED | OUTPATIENT
Start: 2025-04-21 | End: 2025-04-25 | Stop reason: HOSPADM

## 2025-04-21 RX ORDER — POTASSIUM CHLORIDE 7.45 MG/ML
10 INJECTION INTRAVENOUS PRN
Status: DISCONTINUED | OUTPATIENT
Start: 2025-04-21 | End: 2025-04-25 | Stop reason: HOSPADM

## 2025-04-21 RX ORDER — POTASSIUM CHLORIDE 1500 MG/1
40 TABLET, EXTENDED RELEASE ORAL PRN
Status: DISCONTINUED | OUTPATIENT
Start: 2025-04-21 | End: 2025-04-25 | Stop reason: HOSPADM

## 2025-04-21 RX ORDER — NADOLOL 20 MG/1
40 TABLET ORAL DAILY
Status: DISCONTINUED | OUTPATIENT
Start: 2025-04-22 | End: 2025-04-25 | Stop reason: HOSPADM

## 2025-04-21 RX ORDER — CALCIUM CARBONATE 500 MG/1
500 TABLET, CHEWABLE ORAL 3 TIMES DAILY PRN
Status: DISCONTINUED | OUTPATIENT
Start: 2025-04-21 | End: 2025-04-25 | Stop reason: HOSPADM

## 2025-04-21 RX ORDER — MECOBALAMIN 5000 MCG
5 TABLET,DISINTEGRATING ORAL NIGHTLY PRN
Status: DISCONTINUED | OUTPATIENT
Start: 2025-04-21 | End: 2025-04-25 | Stop reason: HOSPADM

## 2025-04-21 RX ORDER — FUROSEMIDE 10 MG/ML
80 INJECTION INTRAMUSCULAR; INTRAVENOUS 2 TIMES DAILY
Status: DISCONTINUED | OUTPATIENT
Start: 2025-04-21 | End: 2025-04-22

## 2025-04-21 RX ORDER — LEVOTHYROXINE SODIUM 175 UG/1
175 TABLET ORAL DAILY
Qty: 30 TABLET | Refills: 1 | Status: CANCELLED | OUTPATIENT
Start: 2025-04-21

## 2025-04-21 RX ORDER — LACTULOSE 10 G/15ML
20 SOLUTION ORAL 2 TIMES DAILY
Status: DISCONTINUED | OUTPATIENT
Start: 2025-04-21 | End: 2025-04-22

## 2025-04-21 RX ORDER — LEVOTHYROXINE SODIUM 175 UG/1
175 TABLET ORAL DAILY
Qty: 90 TABLET | Refills: 1 | Status: SHIPPED | OUTPATIENT
Start: 2025-04-21

## 2025-04-21 RX ADMIN — SODIUM CHLORIDE, PRESERVATIVE FREE 10 ML: 5 INJECTION INTRAVENOUS at 23:13

## 2025-04-21 RX ADMIN — VALSARTAN 40 MG: 80 TABLET ORAL at 23:11

## 2025-04-21 RX ADMIN — ENOXAPARIN SODIUM 30 MG: 100 INJECTION SUBCUTANEOUS at 23:10

## 2025-04-21 RX ADMIN — TRAZODONE HYDROCHLORIDE 50 MG: 50 TABLET ORAL at 23:11

## 2025-04-21 RX ADMIN — IOPAMIDOL 90 ML: 755 INJECTION, SOLUTION INTRAVENOUS at 18:17

## 2025-04-21 RX ADMIN — FUROSEMIDE 80 MG: 10 INJECTION, SOLUTION INTRAMUSCULAR; INTRAVENOUS at 23:12

## 2025-04-21 RX ADMIN — LACTULOSE 20 G: 20 SOLUTION ORAL at 23:10

## 2025-04-21 RX ADMIN — Medication 5 MG: at 23:11

## 2025-04-21 ASSESSMENT — ENCOUNTER SYMPTOMS
SHORTNESS OF BREATH: 1
ABDOMINAL PAIN: 0
ABDOMINAL DISTENTION: 0
NAUSEA: 0
ABDOMINAL DISTENTION: 1
EYE DISCHARGE: 0
SINUS PRESSURE: 0
EYE PAIN: 0
VOMITING: 0
WHEEZING: 0
APNEA: 0
TROUBLE SWALLOWING: 0
DIARRHEA: 0
RHINORRHEA: 0
WHEEZING: 0
CONSTIPATION: 0
BACK PAIN: 0
EYE ITCHING: 0
SORE THROAT: 0
SORE THROAT: 0
ABDOMINAL PAIN: 1
ABDOMINAL PAIN: 0
BACK PAIN: 0
SHORTNESS OF BREATH: 1
CHEST TIGHTNESS: 0
BLOOD IN STOOL: 0
COLOR CHANGE: 0
COUGH: 0
NAUSEA: 0
VOMITING: 0
PHOTOPHOBIA: 0
EYE DISCHARGE: 0

## 2025-04-21 NOTE — ED PROVIDER NOTES
[facility] EMERGENCY DEPARTMENT  eMERGENCYdEPARTMENT eNCOUnter      Pt Name: Yuli Duran  MRN: [id number]  Birthdate 1945  Date of evaluation: 4/21/2025  Provider:KATIA Vsaquez NP    Emergency Department care of this patient was assumed at 1800 from TAE ABRAHAM.  We have discussed the case and the plan of care.  I have seen and evaluated patient and reviewed ED course.      CHIEF COMPLAINT       Chief Complaint   Patient presents with    Swelling     Abdominal swelling, hx of liver issues; 30+ pound weight gain in two week period          PHYSICAL EXAM    (up to 7 for level 4, 8 or more for level 5)     ED Triage Vitals [04/21/25 1608]   BP Systolic BP Percentile Diastolic BP Percentile Temp Temp src Pulse Respirations SpO2   (!) 155/68 -- -- 98.2 °F (36.8 °C) -- 60 18 96 %      Height Weight - Scale         -- 119.7 kg (264 lb)             Physical Exam  Vitals (See Charli De Leon notes for complete physical evaluation.) and nursing note reviewed.   Cardiovascular:      Rate and Rhythm: Normal rate and regular rhythm.   Pulmonary:      Effort: No respiratory distress.   Abdominal:      General: There is distension.   Musculoskeletal:      Right lower leg: 3+ Edema present.      Left lower leg: 3+ Edema present.   Skin:     Coloration: Skin is not jaundiced or pale.   Neurological:      Cranial Nerves: No cranial nerve deficit.      Comments: Patient aware of surroundings, alert and oriented x 3 but slow to respond to questions.         DIAGNOSTIC RESULTS     EKG: All EKG's are interpreted by the Emergency Department Physician who either signs or Co-signs this chart inthe absence of a cardiologist.        RADIOLOGY:   Non-plain film imagessuch as CT, Ultrasound and MRI are read by the radiologist. Plain radiographic images are visualized and preliminarily interpreted by the emergency physician with the below findings:          CT ABDOMEN PELVIS W IV CONTRAST Additional Contrast? None   Final 
specified.    DISCHARGE MEDICATIONS:  Current Discharge Medication List          (Please note that portions of this note were completed with a voice recognition program.  Efforts were made to edit the dictations but occasionallywords are mis-transcribed.)    LEIGH Cho Derek, PA  04/22/25 3205

## 2025-04-21 NOTE — PROGRESS NOTES
[facility]  [facility] INTERNAL MEDICINE  [address] [place] [address] [address]IVE  [address]  [place] [address]  Dept: [phone]  Dept Fax: [phone]  Loc: [phone]      Visit Date: 4/21/2025    Yuli Casarez a 79 y.o. female who presents today for:  Chief Complaint   Patient presents with    Follow-up         HPI:     Here for follow-up 4-month visit with lab.  She has gained about 40 pounds in the last couple of weeks since GI increased her diuretics and added spironolactone and now she is having shortness of breath.    Past Medical History:   Diagnosis Date    Anxiety     Bruising     Diabetes mellitus (HCC)     Edema     Fracture of nasal bone     History of cellulitis     History of constipation     medication induced    Hypertension     Non-alcoholic cirrhosis (HCC)     SDH (subdural hematoma) (HCC) 10/30/2021    small, post fall ([facility]    Sleep disorder     Splenomegaly     Thyroid disease       Past Surgical History:   Procedure Laterality Date    CHOLECYSTECTOMY  1978    COLONOSCOPY  2014    normal, per pt    COLONOSCOPY N/A 04/17/2023    Dr FAVIO Sandoval-AP x 1, 5 yr recall    HYSTERECTOMY (CERVIX STATUS UNKNOWN)  2000    [facility] STEREO BREAST BX W LOC DEVICE 1ST LESION RIGHT Right 04/27/2022    SETH STEROTACTIC LOC BREAST BIOPSY RIGHT 4/27/2022 [facility] [place]    OVARY REMOVAL Bilateral 2000    AGE 55    UPPER GASTROINTESTINAL ENDOSCOPY N/A 03/14/2025    Dr FAVIO Sandoval-Small grade I varices, gastric varices, 1 yr recall       Family History   Problem Relation Age of Onset    Alcohol Abuse Mother     Heart Disease Mother     Lung Cancer Mother     Abdominal aortic aneurysm Mother     Stroke Mother     Breast Cancer Maternal Aunt 72    Breast Cancer Maternal Aunt 74    Stomach Cancer Maternal Uncle 65        Great Uncle    Alcohol Abuse Maternal Grandmother     Epilepsy Maternal Grandmother     Heart Failure Maternal Grandfather     Unknown Paternal Grandmother     Unknown

## 2025-04-22 PROBLEM — K74.60 CIRRHOSIS OF LIVER WITH ASCITES (HCC): Status: ACTIVE | Noted: 2023-02-08

## 2025-04-22 PROBLEM — I50.20 HEART FAILURE WITH REDUCED EJECTION FRACTION (HCC): Status: ACTIVE | Noted: 2025-04-21

## 2025-04-22 PROBLEM — I50.33 ACUTE ON CHRONIC DIASTOLIC (CONGESTIVE) HEART FAILURE (HCC): Status: ACTIVE | Noted: 2025-04-21

## 2025-04-22 PROBLEM — R18.8 CIRRHOSIS OF LIVER WITH ASCITES (HCC): Status: ACTIVE | Noted: 2023-02-08

## 2025-04-22 PROBLEM — R07.9 CHEST PAIN: Status: ACTIVE | Noted: 2025-04-21

## 2025-04-22 LAB
ALBUMIN SERPL-MCNC: 2.8 G/DL (ref 3.5–5.2)
ALP SERPL-CCNC: 131 U/L (ref 35–104)
ALT SERPL-CCNC: 20 U/L (ref 10–35)
ANION GAP SERPL CALCULATED.3IONS-SCNC: 8 MMOL/L (ref 8–16)
AST SERPL-CCNC: 42 U/L (ref 10–35)
BASOPHILS # BLD: 0 K/UL (ref 0–0.2)
BASOPHILS NFR BLD: 0.8 % (ref 0–1)
BILIRUB DIRECT SERPL-MCNC: 0.8 MG/DL (ref 0–0.3)
BILIRUB INDIRECT SERPL-MCNC: 0.9 MG/DL (ref 0–1)
BILIRUB SERPL-MCNC: 1.7 MG/DL (ref 0.2–1.2)
BNP BLD-MCNC: 1683 PG/ML (ref 0–449)
BUN SERPL-MCNC: 20 MG/DL (ref 8–23)
CALCIUM SERPL-MCNC: 8.6 MG/DL (ref 8.8–10.2)
CHLORIDE SERPL-SCNC: 106 MMOL/L (ref 98–107)
CHOLEST SERPL-MCNC: 145 MG/DL (ref 0–199)
CO2 SERPL-SCNC: 26 MMOL/L (ref 22–29)
CREAT SERPL-MCNC: 1.1 MG/DL (ref 0.5–0.9)
EKG P AXIS: 63 DEGREES
EKG P-R INTERVAL: 174 MS
EKG Q-T INTERVAL: 522 MS
EKG QRS DURATION: 84 MS
EKG QTC CALCULATION (BAZETT): 510 MS
EKG T AXIS: -115 DEGREES
EOSINOPHIL # BLD: 0.3 K/UL (ref 0–0.6)
EOSINOPHIL NFR BLD: 5.6 % (ref 0–5)
ERYTHROCYTE [DISTWIDTH] IN BLOOD BY AUTOMATED COUNT: 16.1 % (ref 11.5–14.5)
GLUCOSE SERPL-MCNC: 100 MG/DL (ref 70–99)
HCT VFR BLD AUTO: 35.1 % (ref 37–47)
HDLC SERPL-MCNC: 61 MG/DL (ref 40–60)
HGB BLD-MCNC: 11.3 G/DL (ref 12–16)
IMM GRANULOCYTES # BLD: 0 K/UL
LDLC SERPL CALC-MCNC: 72 MG/DL
LYMPHOCYTES # BLD: 1.3 K/UL (ref 1.1–4.5)
LYMPHOCYTES NFR BLD: 27.7 % (ref 20–40)
MAGNESIUM SERPL-MCNC: 1.7 MG/DL (ref 1.6–2.4)
MAGNESIUM SERPL-MCNC: 1.7 MG/DL (ref 1.6–2.4)
MCH RBC QN AUTO: 31.7 PG (ref 27–31)
MCHC RBC AUTO-ENTMCNC: 32.2 G/DL (ref 33–37)
MCV RBC AUTO: 98.6 FL (ref 81–99)
MONOCYTES # BLD: 0.4 K/UL (ref 0–0.9)
MONOCYTES NFR BLD: 8.9 % (ref 0–10)
NEUTROPHILS # BLD: 2.7 K/UL (ref 1.5–7.5)
NEUTS SEG NFR BLD: 56.8 % (ref 50–65)
PHOSPHATE SERPL-MCNC: 3.5 MG/DL (ref 2.5–4.5)
PLATELET # BLD AUTO: 82 K/UL (ref 130–400)
PMV BLD AUTO: 10.6 FL (ref 9.4–12.3)
POTASSIUM SERPL-SCNC: 3.6 MMOL/L (ref 3.5–5.1)
PROT SERPL-MCNC: 6.1 G/DL (ref 6.4–8.3)
RBC # BLD AUTO: 3.56 M/UL (ref 4.2–5.4)
SODIUM SERPL-SCNC: 140 MMOL/L (ref 136–145)
TRIGL SERPL-MCNC: 59 MG/DL (ref 0–149)
WBC # BLD AUTO: 4.8 K/UL (ref 4.8–10.8)

## 2025-04-22 PROCEDURE — 6370000000 HC RX 637 (ALT 250 FOR IP): Performed by: HOSPITALIST

## 2025-04-22 PROCEDURE — 6370000000 HC RX 637 (ALT 250 FOR IP): Performed by: STUDENT IN AN ORGANIZED HEALTH CARE EDUCATION/TRAINING PROGRAM

## 2025-04-22 PROCEDURE — 36415 COLL VENOUS BLD VENIPUNCTURE: CPT

## 2025-04-22 PROCEDURE — 80061 LIPID PANEL: CPT

## 2025-04-22 PROCEDURE — 2500000003 HC RX 250 WO HCPCS: Performed by: HOSPITALIST

## 2025-04-22 PROCEDURE — 85025 COMPLETE CBC W/AUTO DIFF WBC: CPT

## 2025-04-22 PROCEDURE — 94150 VITAL CAPACITY TEST: CPT

## 2025-04-22 PROCEDURE — 83880 ASSAY OF NATRIURETIC PEPTIDE: CPT

## 2025-04-22 PROCEDURE — 80048 BASIC METABOLIC PNL TOTAL CA: CPT

## 2025-04-22 PROCEDURE — 6370000000 HC RX 637 (ALT 250 FOR IP)

## 2025-04-22 PROCEDURE — 84100 ASSAY OF PHOSPHORUS: CPT

## 2025-04-22 PROCEDURE — 1200000000 HC SEMI PRIVATE

## 2025-04-22 PROCEDURE — 83735 ASSAY OF MAGNESIUM: CPT

## 2025-04-22 PROCEDURE — 6360000002 HC RX W HCPCS

## 2025-04-22 PROCEDURE — 80076 HEPATIC FUNCTION PANEL: CPT

## 2025-04-22 PROCEDURE — 94760 N-INVAS EAR/PLS OXIMETRY 1: CPT

## 2025-04-22 PROCEDURE — 93005 ELECTROCARDIOGRAM TRACING: CPT | Performed by: INTERNAL MEDICINE

## 2025-04-22 PROCEDURE — 99223 1ST HOSP IP/OBS HIGH 75: CPT | Performed by: INTERNAL MEDICINE

## 2025-04-22 PROCEDURE — 6360000002 HC RX W HCPCS: Performed by: HOSPITALIST

## 2025-04-22 PROCEDURE — 93010 ELECTROCARDIOGRAM REPORT: CPT | Performed by: INTERNAL MEDICINE

## 2025-04-22 RX ORDER — POTASSIUM CHLORIDE 1500 MG/1
40 TABLET, EXTENDED RELEASE ORAL ONCE
Status: COMPLETED | OUTPATIENT
Start: 2025-04-22 | End: 2025-04-22

## 2025-04-22 RX ORDER — FUROSEMIDE 10 MG/ML
40 INJECTION INTRAMUSCULAR; INTRAVENOUS 2 TIMES DAILY
Status: DISCONTINUED | OUTPATIENT
Start: 2025-04-22 | End: 2025-04-24

## 2025-04-22 RX ORDER — LACTULOSE 10 G/15ML
30 SOLUTION ORAL 3 TIMES DAILY
Status: DISCONTINUED | OUTPATIENT
Start: 2025-04-22 | End: 2025-04-24

## 2025-04-22 RX ADMIN — NADOLOL 40 MG: 20 TABLET ORAL at 09:54

## 2025-04-22 RX ADMIN — LACTULOSE 30 G: 10 SOLUTION ORAL at 20:05

## 2025-04-22 RX ADMIN — VALSARTAN 40 MG: 80 TABLET ORAL at 09:55

## 2025-04-22 RX ADMIN — SPIRONOLACTONE 25 MG: 25 TABLET ORAL at 09:55

## 2025-04-22 RX ADMIN — RIFAXIMIN 400 MG: 200 TABLET ORAL at 09:54

## 2025-04-22 RX ADMIN — SODIUM CHLORIDE, PRESERVATIVE FREE 10 ML: 5 INJECTION INTRAVENOUS at 09:58

## 2025-04-22 RX ADMIN — RIFAXIMIN 400 MG: 200 TABLET ORAL at 20:05

## 2025-04-22 RX ADMIN — ENOXAPARIN SODIUM 30 MG: 100 INJECTION SUBCUTANEOUS at 09:55

## 2025-04-22 RX ADMIN — ENOXAPARIN SODIUM 30 MG: 100 INJECTION SUBCUTANEOUS at 20:05

## 2025-04-22 RX ADMIN — RIFAXIMIN 400 MG: 200 TABLET ORAL at 00:32

## 2025-04-22 RX ADMIN — FUROSEMIDE 80 MG: 10 INJECTION, SOLUTION INTRAMUSCULAR; INTRAVENOUS at 09:55

## 2025-04-22 RX ADMIN — Medication 5000 UNITS: at 09:55

## 2025-04-22 RX ADMIN — TRAZODONE HYDROCHLORIDE 50 MG: 50 TABLET ORAL at 20:12

## 2025-04-22 RX ADMIN — FUROSEMIDE 40 MG: 10 INJECTION, SOLUTION INTRAMUSCULAR; INTRAVENOUS at 16:37

## 2025-04-22 RX ADMIN — SODIUM CHLORIDE, PRESERVATIVE FREE 10 ML: 5 INJECTION INTRAVENOUS at 20:05

## 2025-04-22 RX ADMIN — RIFAXIMIN 400 MG: 200 TABLET ORAL at 13:19

## 2025-04-22 RX ADMIN — ESCITALOPRAM OXALATE 20 MG: 10 TABLET ORAL at 09:54

## 2025-04-22 RX ADMIN — LACTULOSE 30 G: 10 SOLUTION ORAL at 10:05

## 2025-04-22 RX ADMIN — VALSARTAN 40 MG: 80 TABLET ORAL at 20:06

## 2025-04-22 RX ADMIN — POTASSIUM CHLORIDE 40 MEQ: 1500 TABLET, EXTENDED RELEASE ORAL at 10:05

## 2025-04-22 RX ADMIN — LEVOTHYROXINE SODIUM 175 MCG: 0.05 TABLET ORAL at 05:45

## 2025-04-22 RX ADMIN — LACTULOSE 30 G: 10 SOLUTION ORAL at 13:19

## 2025-04-22 ASSESSMENT — ENCOUNTER SYMPTOMS
EYES NEGATIVE: 1
GASTROINTESTINAL NEGATIVE: 1
RESPIRATORY NEGATIVE: 1
DIARRHEA: 0
VOMITING: 0
SHORTNESS OF BREATH: 0
NAUSEA: 0

## 2025-04-22 NOTE — PLAN OF CARE
SUBJECTIVE:    Diffuse swelling of legs and abdomen, saturating well on RA, Hx liver disease with 30 pound weight gain, no mention of chest pain      OBJECTIVE:    BP (!) 147/76   Pulse 51   Temp 98.2 °F (36.8 °C)   Resp 14   Wt 119.7 kg (264 lb)   SpO2 100%   BMI 43.93 kg/m²       ASSESSMENTS & PLANS:    Diffuse Abdominal and Lower Extremity Edema likely due to New onset Right sided CHF: will need ACS R/O as well  Tele  Admit to [facility] as inpatient status patient  Cardio consult in AM  Trend Tn  Mag, Phos, TSH, Lipid Panel, HbA1c - will run as add ons to ED labs if able  CBC and BMP with Reflex for following AM  ASA as per protocol (324-325mg chewed STAT unless on 81ASA daily in which case 162mg chewed STAT if not yet given, THEN from following AM 81mg Daily.)  Statin as per protocol (High intensity Statin, if already on high intensity Stain of Simvasttain 80 continue it, if Lipitor 40+ then Lipitor 80 (if less than 40 just double so long as >=40), if Rosuvastatin 20mg+ then Rosuvastatin 40mg PO QHS (if less than 20 just double so long as >=20mg)  NG SL PRN CP  TTE deferred to cardio as had one recently  Strict Is and Os  Daily Weights  Vaksartan 40mg PO BID as per CHF  Lasix 80mg IVP BID in place of home 20mg PO QDay  Continue aldactone 25mg PO QDay  Continue nadolol 40mg PO QDay    Chronic Medical Problems:  Continue home regimen as indicated  Any puffers will be subbed to nebulizers as able  Any oral antihyperglycemics will be subbed to an insulin regimen with POCT scheduled & PRN as well as provision of an antihypoglycemic orders set for safety    Supportive and Prophylactic Txx:  DVT PPx: Lovenox SQ  GI (PUD) PPx: not indicated  PT: defer for now as per ongoing ACS R/O      Case flagged by ED provider for consultation / anticipated admission  Chart reviewed   Orders entered by me with CPOE  Case d/w NP swing shift hospitlaist

## 2025-04-22 NOTE — H&P
[facility]      Hospitalist - History & Physical      PCP: Dereje John MD    Date of Admission: 4/21/2025    Date of Service: 4/21/2025    Chief Complaint:  Swelling    History Of Present Illness:   The patient is a 79 y.o. female with anxiety, diabetes, HTN, MILLER, thyroid disease comes to ED with complaints of abdominal swelling.  Patient has history of MILLER and has reported 30+ pound weight gain in a 2-week time.  Patient is a poor historian and has some confusion.  Patient was found to have an ammonia level of 165 in ED.  She is able to answer yes and no to simple questions however she cannot provide me much history.  She does know where she is, year, and who she is.  Patient has bilateral lower extremity edema that she says has been going on for a while however has progressed and gotten worse in the last 2 weeks.  Patient also endorses shortness of breath.  Denies chest pain, palpitations, fever, nausea, vomiting, diarrhea.     In ED: CXR with cardiomegaly and otherwise unremarkable; CT abdomen/pelvis with liver cirrhosis and findings of portal hypertension including splenomegaly, small volume ascites, superficial soft tissue edema, skin thickening in the lower breasts; UA unremarkable for UTI; WBC 5.9, H/H 12.2/37.4, TSH 0.107, free t4 1.87, lipase 73, AST 46, ALT 25, ammonia 165, BNP 1,668, creatinine 1.0, BUN 19, GFR 58.  Will admit inpatient to hospitalist with consult to     Past Medical History:        Diagnosis Date    Anxiety     Bruising     Diabetes mellitus (HCC)     Edema     Fracture of nasal bone     History of cellulitis     History of constipation     medication induced    Hypertension     Non-alcoholic cirrhosis (HCC)     SDH (subdural hematoma) (HCC) 10/30/2021    small, post fall ([facility]    Sleep disorder     Splenomegaly     Thyroid disease        Past Surgical History:        Procedure Laterality Date    CHOLECYSTECTOMY  1978    COLONOSCOPY  2014

## 2025-04-22 NOTE — CARE COORDINATION
Case Management Assessment  Initial Evaluation    Date/Time of Evaluation: 4/22/2025 8:52 AM  Assessment Completed by: ROSEMARY SPANGLER    If patient is discharged prior to next notation, then this note serves as note for discharge by case management.    Patient Name: Yuli Duran                   YOB: 1945  Diagnosis: Hyperammonemia [E72.20]  MILLER (nonalcoholic steatohepatitis) [K75.81]  Rapid weight gain [R63.5]  Altered mental status, unspecified altered mental status type [R41.82]  New onset of congestive heart failure (HCC) [I50.9]                   Date / Time: 4/21/2025  4:44 PM    Patient Admission Status: Inpatient   Readmission Risk (Low < 19, Mod (19-27), High > 27): Readmission Risk Score: 14.8    Current PCP: Dereje John MD  PCP verified by CM? (P) Yes    Chart Reviewed: Yes      History Provided by: (P) Patient  Patient Orientation: (P) Alert and Oriented, Person, Place, Situation, Self    Patient Cognition: (P) Alert    Hospitalization in the last 30 days (Readmission):  No    If yes, Readmission Assessment in CM Navigator will be completed.    Advance Directives:      Code Status: Full Code   Patient's Primary Decision Maker is: (P) Legal Next of Kin    Primary Decision Maker: Martine Osborn - Child - [phone]    Discharge Planning:    Patient lives with: (P) Spouse/Significant Other Type of Home: (P) House  Primary Care Giver: (P) Self  Patient Support Systems include: (P) Spouse/Significant Other   Current Financial resources: (P) Medicare  Current community resources:    Current services prior to admission: (P) None            Current DME:              Type of Home Care services:  (P) None    ADLS  Prior functional level: (P) Independent in ADLs/IADLs  Current functional level: (P) Independent in ADLs/IADLs    PT AM-PAC:   /24  OT AM-PAC:   /24    Family can provide assistance at DC: (P) Yes  Would you like Case Management to discuss the discharge plan with any

## 2025-04-22 NOTE — CONSULTS
[facility]  Cardiology Consult      Requesting MD:  Enzo Christianson MD   Admit Status:         History obtained from:   [] Patient  [] Other (specify):     Patient:  Yuli Duran  [id number]     Chief Complaint:   Chief Complaint   Patient presents with    Swelling     Abdominal swelling, hx of liver issues; 30+ pound weight gain in two week period          HPI: Ms. Duran is a 79 y.o. female admitted 4/21/2025 increasing shortness of breath fatigue and edema.  Noted to have diffuse abdominal and lower extremity edema likely due to new onset right-sided congestive heart failure.  No previous invasive assessment.  Echocardiogram 1/9/2025 showed normal left ventricular and right ventricular size and function ejection fraction left ventricular 60 to 65%.  Mild left ventricular hypertrophy normal wall motion grade 2 diastolic dysfunction right atrium mildly dilated trace MR trace TR noted.  Gets out of breath can walk just a short distance she does not really note any chest discomfort.  N-terminal proBNP 1668 up from 448 about a year ago.  Troponin 22 creatinine 1.1 albumin 3.3.  Cardiology consulted.    Review of Systems:  Review of Systems   Constitutional: Negative.  Negative for chills, fever and unexpected weight change.   HENT: Negative.     Eyes: Negative.    Respiratory: Negative.  Negative for shortness of breath.    Cardiovascular: Negative.  Negative for chest pain.   Gastrointestinal: Negative.  Negative for diarrhea, nausea and vomiting.   Endocrine: Negative.    Genitourinary: Negative.    Musculoskeletal: Negative.    Skin: Negative.    Neurological: Negative.    All other systems reviewed and are negative.      Cardiac Specific Data:  Specialty Problems          Cardiology Problems    Primary hypertension        * (Principal) Acute on chronic diastolic (congestive) heart failure           Past Medical History:  Past Medical History:   Diagnosis Date    Anxiety

## 2025-04-22 NOTE — PLAN OF CARE
Problem: Chronic Conditions and Co-morbidities  Goal: Patient's chronic conditions and co-morbidity symptoms are monitored and maintained or improved  Outcome: Progressing  Flowsheets (Taken 4/21/2025 2300)  Care Plan - Patient's Chronic Conditions and Co-Morbidity Symptoms are Monitored and Maintained or Improved: Monitor and assess patient's chronic conditions and comorbid symptoms for stability, deterioration, or improvement     Problem: Safety - Adult  Goal: Free from fall injury  Outcome: Progressing  Flowsheets (Taken 4/22/2025 0052)  Free From Fall Injury: Instruct family/caregiver on patient safety     Problem: ABCDS Injury Assessment  Goal: Absence of physical injury  Outcome: Progressing  Flowsheets (Taken 4/22/2025 0052)  Absence of Physical Injury: Implement safety measures based on patient assessment

## 2025-04-23 ENCOUNTER — APPOINTMENT (OUTPATIENT)
Age: 80
DRG: 286 | End: 2025-04-23
Attending: INTERNAL MEDICINE
Payer: MEDICARE

## 2025-04-23 LAB
AMMONIA PLAS-SCNC: 80 UMOL/L (ref 11–51)
ANION GAP SERPL CALCULATED.3IONS-SCNC: 8 MMOL/L (ref 8–16)
BASOPHILS # BLD: 0 K/UL (ref 0–0.2)
BASOPHILS NFR BLD: 0.8 % (ref 0–1)
BNP BLD-MCNC: 1606 PG/ML (ref 0–449)
BUN SERPL-MCNC: 20 MG/DL (ref 8–23)
CALCIUM SERPL-MCNC: 8.6 MG/DL (ref 8.8–10.2)
CHLORIDE SERPL-SCNC: 105 MMOL/L (ref 98–107)
CO2 SERPL-SCNC: 28 MMOL/L (ref 22–29)
CREAT SERPL-MCNC: 1.1 MG/DL (ref 0.5–0.9)
D DIMER PPP FEU-MCNC: 0.93 UG/ML FEU (ref 0–0.48)
ECHO AO ASC DIAM: 2.7 CM
ECHO AO ASCENDING AORTA INDEX: 1.22 CM/M2
ECHO AO ROOT DIAM: 1.6 CM
ECHO AO ROOT INDEX: 0.72 CM/M2
ECHO AO SINUS VALSALVA DIAM: 2.3 CM
ECHO AO SINUS VALSALVA INDEX: 1.04 CM/M2
ECHO AO ST JNCT DIAM: 2.1 CM
ECHO AV AREA PEAK VELOCITY: 1.9 CM2
ECHO AV AREA VTI: 2.2 CM2
ECHO AV AREA/BSA PEAK VELOCITY: 0.9 CM2/M2
ECHO AV AREA/BSA VTI: 1 CM2/M2
ECHO AV MEAN GRADIENT: 5 MMHG
ECHO AV MEAN VELOCITY: 1.1 M/S
ECHO AV PEAK GRADIENT: 10 MMHG
ECHO AV PEAK VELOCITY: 1.6 M/S
ECHO AV VTI: 38.1 CM
ECHO BSA: 2.33 M2
ECHO EST RA PRESSURE: 3 MMHG
ECHO IVC PROX: 1 CM
ECHO LA AREA 2C: 17.6 CM2
ECHO LA AREA 4C: 19.1 CM2
ECHO LA DIAMETER INDEX: 1.62 CM/M2
ECHO LA DIAMETER: 3.6 CM
ECHO LA MAJOR AXIS: 5.4 CM
ECHO LA MINOR AXIS: 4.7 CM
ECHO LA TO AORTIC ROOT RATIO: 2.25
ECHO LA VOL BP: 57 ML (ref 22–52)
ECHO LA VOL MOD A2C: 51 ML (ref 22–52)
ECHO LA VOL MOD A4C: 55 ML (ref 22–52)
ECHO LA VOL/BSA BIPLANE: 26 ML/M2 (ref 16–34)
ECHO LA VOLUME INDEX MOD A2C: 23 ML/M2 (ref 16–34)
ECHO LA VOLUME INDEX MOD A4C: 25 ML/M2 (ref 16–34)
ECHO LV E' LATERAL VELOCITY: 6.64 CM/S
ECHO LV E' SEPTAL VELOCITY: 4.68 CM/S
ECHO LV EDV A2C: 86 ML
ECHO LV EDV A4C: 93 ML
ECHO LV EDV INDEX A4C: 42 ML/M2
ECHO LV EDV NDEX A2C: 39 ML/M2
ECHO LV EJECTION FRACTION A2C: 67 %
ECHO LV EJECTION FRACTION A4C: 66 %
ECHO LV EJECTION FRACTION BIPLANE: 64 % (ref 55–100)
ECHO LV ESV A2C: 29 ML
ECHO LV ESV A4C: 32 ML
ECHO LV ESV INDEX A2C: 13 ML/M2
ECHO LV ESV INDEX A4C: 14 ML/M2
ECHO LV FRACTIONAL SHORTENING: 34 % (ref 28–44)
ECHO LV INTERNAL DIMENSION DIASTOLE INDEX: 2.25 CM/M2
ECHO LV INTERNAL DIMENSION DIASTOLIC: 5 CM (ref 3.9–5.3)
ECHO LV INTERNAL DIMENSION SYSTOLIC INDEX: 1.49 CM/M2
ECHO LV INTERNAL DIMENSION SYSTOLIC: 3.3 CM
ECHO LV IVSD: 0.8 CM (ref 0.6–0.9)
ECHO LV MASS 2D: 169.9 G (ref 67–162)
ECHO LV MASS INDEX 2D: 76.5 G/M2 (ref 43–95)
ECHO LV POSTERIOR WALL DIASTOLIC: 1.1 CM (ref 0.6–0.9)
ECHO LV RELATIVE WALL THICKNESS RATIO: 0.44
ECHO LVOT AREA: 3.1 CM2
ECHO LVOT AV VTI INDEX: 0.69
ECHO LVOT DIAM: 2 CM
ECHO LVOT MEAN GRADIENT: 2 MMHG
ECHO LVOT MEAN GRADIENT: 2 MMHG
ECHO LVOT PEAK GRADIENT: 3 MMHG
ECHO LVOT PEAK VELOCITY: 0.9 M/S
ECHO LVOT PEAK VELOCITY: 0.9 M/S
ECHO LVOT STROKE VOLUME INDEX: 36.9 ML/M2
ECHO LVOT SV: 82 ML
ECHO LVOT VTI: 26.1 CM
ECHO MV A VELOCITY: 0.72 M/S
ECHO MV AREA VTI: 1.8 CM2
ECHO MV E DECELERATION TIME (DT): 206 MS
ECHO MV E VELOCITY: 1.23 M/S
ECHO MV E/A RATIO: 1.71
ECHO MV E/E' LATERAL: 18.52
ECHO MV E/E' RATIO (AVERAGED): 22.4
ECHO MV E/E' SEPTAL: 26.28
ECHO MV LVOT VTI INDEX: 1.7
ECHO MV MAX VELOCITY: 1.3 M/S
ECHO MV MEAN GRADIENT: 2 MMHG
ECHO MV MEAN VELOCITY: 0.7 M/S
ECHO MV PEAK GRADIENT: 7 MMHG
ECHO MV REGURGITANT PEAK GRADIENT: 64 MMHG
ECHO MV REGURGITANT PEAK VELOCITY: 4 M/S
ECHO MV REGURGITANT VTIA: 148 CM
ECHO MV VTI: 44.5 CM
ECHO RA AREA 4C: 12.5 CM2
ECHO RA END SYSTOLIC VOLUME APICAL 4 CHAMBER INDEX BSA: 13 ML/M2
ECHO RA VOLUME: 28 ML
ECHO RIGHT VENTRICULAR SYSTOLIC PRESSURE (RVSP): 41 MMHG
ECHO RV BASAL DIMENSION: 3.2 CM
ECHO RV INTERNAL DIMENSION: 2.8 CM
ECHO RV LONGITUDINAL DIMENSION: 6.7 CM
ECHO RV MID DIMENSION: 2.5 CM
ECHO RV TAPSE: 1.8 CM (ref 1.7–?)
ECHO TV REGURGITANT MAX VELOCITY: 3.1 M/S
ECHO TV REGURGITANT PEAK GRADIENT: 38 MMHG
EOSINOPHIL # BLD: 0.3 K/UL (ref 0–0.6)
EOSINOPHIL NFR BLD: 5.4 % (ref 0–5)
ERYTHROCYTE [DISTWIDTH] IN BLOOD BY AUTOMATED COUNT: 16.2 % (ref 11.5–14.5)
GLUCOSE SERPL-MCNC: 114 MG/DL (ref 70–99)
HCT VFR BLD AUTO: 34.4 % (ref 37–47)
HGB BLD-MCNC: 11 G/DL (ref 12–16)
IMM GRANULOCYTES # BLD: 0 K/UL
LYMPHOCYTES # BLD: 1.4 K/UL (ref 1.1–4.5)
LYMPHOCYTES NFR BLD: 27.5 % (ref 20–40)
MAGNESIUM SERPL-MCNC: 1.7 MG/DL (ref 1.6–2.4)
MCH RBC QN AUTO: 31.9 PG (ref 27–31)
MCHC RBC AUTO-ENTMCNC: 32 G/DL (ref 33–37)
MCV RBC AUTO: 99.7 FL (ref 81–99)
MONOCYTES # BLD: 0.4 K/UL (ref 0–0.9)
MONOCYTES NFR BLD: 8.6 % (ref 0–10)
NEUTROPHILS # BLD: 2.9 K/UL (ref 1.5–7.5)
NEUTS SEG NFR BLD: 57.3 % (ref 50–65)
PLATELET # BLD AUTO: 73 K/UL (ref 130–400)
PMV BLD AUTO: 10.8 FL (ref 9.4–12.3)
POTASSIUM SERPL-SCNC: 3.6 MMOL/L (ref 3.5–5.1)
RBC # BLD AUTO: 3.45 M/UL (ref 4.2–5.4)
SODIUM SERPL-SCNC: 141 MMOL/L (ref 136–145)
WBC # BLD AUTO: 5 K/UL (ref 4.8–10.8)

## 2025-04-23 PROCEDURE — 1200000000 HC SEMI PRIVATE

## 2025-04-23 PROCEDURE — 2500000003 HC RX 250 WO HCPCS: Performed by: INTERNAL MEDICINE

## 2025-04-23 PROCEDURE — 94760 N-INVAS EAR/PLS OXIMETRY 1: CPT

## 2025-04-23 PROCEDURE — 6370000000 HC RX 637 (ALT 250 FOR IP)

## 2025-04-23 PROCEDURE — 80048 BASIC METABOLIC PNL TOTAL CA: CPT

## 2025-04-23 PROCEDURE — 6370000000 HC RX 637 (ALT 250 FOR IP): Performed by: HOSPITALIST

## 2025-04-23 PROCEDURE — 2500000003 HC RX 250 WO HCPCS: Performed by: HOSPITALIST

## 2025-04-23 PROCEDURE — B2151ZZ FLUOROSCOPY OF LEFT HEART USING LOW OSMOLAR CONTRAST: ICD-10-PCS | Performed by: INTERNAL MEDICINE

## 2025-04-23 PROCEDURE — 82140 ASSAY OF AMMONIA: CPT

## 2025-04-23 PROCEDURE — B2111ZZ FLUOROSCOPY OF MULTIPLE CORONARY ARTERIES USING LOW OSMOLAR CONTRAST: ICD-10-PCS | Performed by: INTERNAL MEDICINE

## 2025-04-23 PROCEDURE — 6360000002 HC RX W HCPCS

## 2025-04-23 PROCEDURE — C8929 TTE W OR WO FOL WCON,DOPPLER: HCPCS

## 2025-04-23 PROCEDURE — 83880 ASSAY OF NATRIURETIC PEPTIDE: CPT

## 2025-04-23 PROCEDURE — C1769 GUIDE WIRE: HCPCS | Performed by: INTERNAL MEDICINE

## 2025-04-23 PROCEDURE — 6360000004 HC RX CONTRAST MEDICATION: Performed by: INTERNAL MEDICINE

## 2025-04-23 PROCEDURE — 83735 ASSAY OF MAGNESIUM: CPT

## 2025-04-23 PROCEDURE — 36415 COLL VENOUS BLD VENIPUNCTURE: CPT

## 2025-04-23 PROCEDURE — 6370000000 HC RX 637 (ALT 250 FOR IP): Performed by: INTERNAL MEDICINE

## 2025-04-23 PROCEDURE — C1894 INTRO/SHEATH, NON-LASER: HCPCS | Performed by: INTERNAL MEDICINE

## 2025-04-23 PROCEDURE — 2709999900 HC NON-CHARGEABLE SUPPLY: Performed by: INTERNAL MEDICINE

## 2025-04-23 PROCEDURE — 6360000002 HC RX W HCPCS: Performed by: INTERNAL MEDICINE

## 2025-04-23 PROCEDURE — 2580000003 HC RX 258: Performed by: INTERNAL MEDICINE

## 2025-04-23 PROCEDURE — 4A023N7 MEASUREMENT OF CARDIAC SAMPLING AND PRESSURE, LEFT HEART, PERCUTANEOUS APPROACH: ICD-10-PCS | Performed by: INTERNAL MEDICINE

## 2025-04-23 PROCEDURE — 93458 L HRT ARTERY/VENTRICLE ANGIO: CPT | Performed by: INTERNAL MEDICINE

## 2025-04-23 PROCEDURE — 85025 COMPLETE CBC W/AUTO DIFF WBC: CPT

## 2025-04-23 PROCEDURE — 85379 FIBRIN DEGRADATION QUANT: CPT

## 2025-04-23 PROCEDURE — 99152 MOD SED SAME PHYS/QHP 5/>YRS: CPT | Performed by: INTERNAL MEDICINE

## 2025-04-23 RX ORDER — SODIUM CHLORIDE 9 MG/ML
INJECTION, SOLUTION INTRAVENOUS CONTINUOUS
Status: DISCONTINUED | OUTPATIENT
Start: 2025-04-23 | End: 2025-04-23 | Stop reason: HOSPADM

## 2025-04-23 RX ORDER — NITROGLYCERIN 20 MG/100ML
INJECTION INTRAVENOUS PRN
Status: DISCONTINUED | OUTPATIENT
Start: 2025-04-23 | End: 2025-04-23 | Stop reason: HOSPADM

## 2025-04-23 RX ORDER — HEPARIN SODIUM 1000 [USP'U]/ML
INJECTION, SOLUTION INTRAVENOUS; SUBCUTANEOUS PRN
Status: DISCONTINUED | OUTPATIENT
Start: 2025-04-23 | End: 2025-04-23 | Stop reason: HOSPADM

## 2025-04-23 RX ORDER — MIDAZOLAM HYDROCHLORIDE 1 MG/ML
INJECTION, SOLUTION INTRAMUSCULAR; INTRAVENOUS PRN
Status: DISCONTINUED | OUTPATIENT
Start: 2025-04-23 | End: 2025-04-23 | Stop reason: HOSPADM

## 2025-04-23 RX ORDER — ASPIRIN 81 MG/1
81 TABLET ORAL ONCE
Status: COMPLETED | OUTPATIENT
Start: 2025-04-23 | End: 2025-04-23

## 2025-04-23 RX ORDER — FENTANYL CITRATE 50 UG/ML
INJECTION, SOLUTION INTRAMUSCULAR; INTRAVENOUS PRN
Status: DISCONTINUED | OUTPATIENT
Start: 2025-04-23 | End: 2025-04-23 | Stop reason: HOSPADM

## 2025-04-23 RX ORDER — IOPAMIDOL 612 MG/ML
INJECTION, SOLUTION INTRAVASCULAR PRN
Status: DISCONTINUED | OUTPATIENT
Start: 2025-04-23 | End: 2025-04-23 | Stop reason: HOSPADM

## 2025-04-23 RX ORDER — VERAPAMIL HYDROCHLORIDE 2.5 MG/ML
INJECTION, SOLUTION INTRAVENOUS PRN
Status: DISCONTINUED | OUTPATIENT
Start: 2025-04-23 | End: 2025-04-23 | Stop reason: HOSPADM

## 2025-04-23 RX ADMIN — NADOLOL 40 MG: 20 TABLET ORAL at 08:46

## 2025-04-23 RX ADMIN — RIFAXIMIN 400 MG: 200 TABLET ORAL at 13:39

## 2025-04-23 RX ADMIN — VALSARTAN 40 MG: 80 TABLET ORAL at 21:29

## 2025-04-23 RX ADMIN — SULFUR HEXAFLUORIDE 2 ML: 60.7; .19; .19 INJECTION, POWDER, LYOPHILIZED, FOR SUSPENSION INTRAVENOUS; INTRAVESICAL at 08:09

## 2025-04-23 RX ADMIN — LACTULOSE 30 G: 10 SOLUTION ORAL at 21:29

## 2025-04-23 RX ADMIN — ESCITALOPRAM OXALATE 20 MG: 10 TABLET ORAL at 08:47

## 2025-04-23 RX ADMIN — ASPIRIN 81 MG: 81 TABLET, COATED ORAL at 05:50

## 2025-04-23 RX ADMIN — SODIUM CHLORIDE, PRESERVATIVE FREE 10 ML: 5 INJECTION INTRAVENOUS at 21:29

## 2025-04-23 RX ADMIN — TRAZODONE HYDROCHLORIDE 50 MG: 50 TABLET ORAL at 21:29

## 2025-04-23 RX ADMIN — LACTULOSE 30 G: 10 SOLUTION ORAL at 08:48

## 2025-04-23 RX ADMIN — RIFAXIMIN 400 MG: 200 TABLET ORAL at 08:47

## 2025-04-23 RX ADMIN — Medication 5000 UNITS: at 08:46

## 2025-04-23 RX ADMIN — FUROSEMIDE 40 MG: 10 INJECTION, SOLUTION INTRAMUSCULAR; INTRAVENOUS at 08:48

## 2025-04-23 RX ADMIN — RIFAXIMIN 400 MG: 200 TABLET ORAL at 21:29

## 2025-04-23 RX ADMIN — LACTULOSE 30 G: 10 SOLUTION ORAL at 13:39

## 2025-04-23 RX ADMIN — VALSARTAN 40 MG: 80 TABLET ORAL at 08:47

## 2025-04-23 RX ADMIN — SODIUM CHLORIDE: 0.9 INJECTION, SOLUTION INTRAVENOUS at 10:30

## 2025-04-23 RX ADMIN — SPIRONOLACTONE 25 MG: 25 TABLET ORAL at 09:00

## 2025-04-23 RX ADMIN — FUROSEMIDE 40 MG: 10 INJECTION, SOLUTION INTRAMUSCULAR; INTRAVENOUS at 17:28

## 2025-04-23 RX ADMIN — SODIUM CHLORIDE, PRESERVATIVE FREE 10 ML: 5 INJECTION INTRAVENOUS at 08:48

## 2025-04-23 NOTE — PLAN OF CARE
Problem: Chronic Conditions and Co-morbidities  Goal: Patient's chronic conditions and co-morbidity symptoms are monitored and maintained or improved  Recent Flowsheet Documentation  Taken 4/23/2025 0738 by Jessie Connor RN  Care Plan - Patient's Chronic Conditions and Co-Morbidity Symptoms are Monitored and Maintained or Improved: Monitor and assess patient's chronic conditions and comorbid symptoms for stability, deterioration, or improvement  4/23/2025 0235 by Tim Oshea RN  Outcome: Progressing     Problem: Safety - Adult  Goal: Free from fall injury  Recent Flowsheet Documentation  Taken 4/23/2025 0747 by Jessie Connor RN  Free From Fall Injury: Instruct family/caregiver on patient safety  4/23/2025 0235 by Tim Oshea RN  Outcome: Progressing     Problem: ABCDS Injury Assessment  Goal: Absence of physical injury  Recent Flowsheet Documentation  Taken 4/23/2025 0747 by Jessie Connor RN  Absence of Physical Injury: Implement safety measures based on patient assessment  4/23/2025 0235 by Tim Oshea RN  Outcome: Progressing

## 2025-04-24 ENCOUNTER — APPOINTMENT (OUTPATIENT)
Dept: CT IMAGING | Age: 80
DRG: 286 | End: 2025-04-24
Payer: MEDICARE

## 2025-04-24 LAB
ANION GAP SERPL CALCULATED.3IONS-SCNC: 9 MMOL/L (ref 8–16)
BASOPHILS # BLD: 0 K/UL (ref 0–0.2)
BASOPHILS NFR BLD: 0.4 % (ref 0–1)
BUN SERPL-MCNC: 18 MG/DL (ref 8–23)
CALCIUM SERPL-MCNC: 8.8 MG/DL (ref 8.8–10.2)
CHLORIDE SERPL-SCNC: 103 MMOL/L (ref 98–107)
CO2 SERPL-SCNC: 28 MMOL/L (ref 22–29)
CREAT SERPL-MCNC: 1 MG/DL (ref 0.5–0.9)
ECHO BSA: 2.33 M2
EOSINOPHIL # BLD: 0.3 K/UL (ref 0–0.6)
EOSINOPHIL NFR BLD: 5.7 % (ref 0–5)
ERYTHROCYTE [DISTWIDTH] IN BLOOD BY AUTOMATED COUNT: 16.1 % (ref 11.5–14.5)
GLUCOSE BLD-MCNC: 118 MG/DL (ref 70–99)
GLUCOSE SERPL-MCNC: 182 MG/DL (ref 70–99)
HCT VFR BLD AUTO: 37.8 % (ref 37–47)
HGB BLD-MCNC: 12 G/DL (ref 12–16)
IMM GRANULOCYTES # BLD: 0 K/UL
LYMPHOCYTES # BLD: 1.3 K/UL (ref 1.1–4.5)
LYMPHOCYTES NFR BLD: 26.4 % (ref 20–40)
MAGNESIUM SERPL-MCNC: 1.6 MG/DL (ref 1.6–2.4)
MCH RBC QN AUTO: 32.3 PG (ref 27–31)
MCHC RBC AUTO-ENTMCNC: 31.7 G/DL (ref 33–37)
MCV RBC AUTO: 101.9 FL (ref 81–99)
MONOCYTES # BLD: 0.4 K/UL (ref 0–0.9)
MONOCYTES NFR BLD: 7.4 % (ref 0–10)
NEUTROPHILS # BLD: 2.8 K/UL (ref 1.5–7.5)
NEUTS SEG NFR BLD: 59.9 % (ref 50–65)
PERFORMED ON: ABNORMAL
PLATELET # BLD AUTO: 73 K/UL (ref 130–400)
PMV BLD AUTO: 11.1 FL (ref 9.4–12.3)
POTASSIUM SERPL-SCNC: 3.5 MMOL/L (ref 3.5–5.1)
RBC # BLD AUTO: 3.71 M/UL (ref 4.2–5.4)
SODIUM SERPL-SCNC: 140 MMOL/L (ref 136–145)
WBC # BLD AUTO: 4.7 K/UL (ref 4.8–10.8)

## 2025-04-24 PROCEDURE — 94760 N-INVAS EAR/PLS OXIMETRY 1: CPT

## 2025-04-24 PROCEDURE — 36415 COLL VENOUS BLD VENIPUNCTURE: CPT

## 2025-04-24 PROCEDURE — 6370000000 HC RX 637 (ALT 250 FOR IP): Performed by: HOSPITALIST

## 2025-04-24 PROCEDURE — 2500000003 HC RX 250 WO HCPCS: Performed by: HOSPITALIST

## 2025-04-24 PROCEDURE — 85025 COMPLETE CBC W/AUTO DIFF WBC: CPT

## 2025-04-24 PROCEDURE — 6360000002 HC RX W HCPCS

## 2025-04-24 PROCEDURE — 6370000000 HC RX 637 (ALT 250 FOR IP)

## 2025-04-24 PROCEDURE — 83735 ASSAY OF MAGNESIUM: CPT

## 2025-04-24 PROCEDURE — 71275 CT ANGIOGRAPHY CHEST: CPT

## 2025-04-24 PROCEDURE — 1200000000 HC SEMI PRIVATE

## 2025-04-24 PROCEDURE — 6360000004 HC RX CONTRAST MEDICATION: Performed by: INTERNAL MEDICINE

## 2025-04-24 PROCEDURE — 80048 BASIC METABOLIC PNL TOTAL CA: CPT

## 2025-04-24 PROCEDURE — 82962 GLUCOSE BLOOD TEST: CPT

## 2025-04-24 RX ORDER — FUROSEMIDE 10 MG/ML
40 INJECTION INTRAMUSCULAR; INTRAVENOUS 2 TIMES DAILY
Status: DISCONTINUED | OUTPATIENT
Start: 2025-04-24 | End: 2025-04-25

## 2025-04-24 RX ORDER — FUROSEMIDE 40 MG/1
40 TABLET ORAL DAILY
Status: DISCONTINUED | OUTPATIENT
Start: 2025-04-24 | End: 2025-04-24

## 2025-04-24 RX ORDER — LACTULOSE 10 G/15ML
20 SOLUTION ORAL 2 TIMES DAILY
Status: DISCONTINUED | OUTPATIENT
Start: 2025-04-24 | End: 2025-04-25 | Stop reason: HOSPADM

## 2025-04-24 RX ORDER — IOPAMIDOL 755 MG/ML
75 INJECTION, SOLUTION INTRAVASCULAR
Status: COMPLETED | OUTPATIENT
Start: 2025-04-24 | End: 2025-04-24

## 2025-04-24 RX ADMIN — FUROSEMIDE 40 MG: 10 INJECTION, SOLUTION INTRAMUSCULAR; INTRAVENOUS at 12:54

## 2025-04-24 RX ADMIN — TRAZODONE HYDROCHLORIDE 50 MG: 50 TABLET ORAL at 21:58

## 2025-04-24 RX ADMIN — LACTULOSE 30 G: 10 SOLUTION ORAL at 09:26

## 2025-04-24 RX ADMIN — RIFAXIMIN 400 MG: 200 TABLET ORAL at 21:58

## 2025-04-24 RX ADMIN — FUROSEMIDE 40 MG: 10 INJECTION, SOLUTION INTRAMUSCULAR; INTRAVENOUS at 16:05

## 2025-04-24 RX ADMIN — SPIRONOLACTONE 25 MG: 25 TABLET ORAL at 09:27

## 2025-04-24 RX ADMIN — VALSARTAN 40 MG: 80 TABLET ORAL at 09:27

## 2025-04-24 RX ADMIN — RIFAXIMIN 400 MG: 200 TABLET ORAL at 09:27

## 2025-04-24 RX ADMIN — RIFAXIMIN 400 MG: 200 TABLET ORAL at 12:54

## 2025-04-24 RX ADMIN — FUROSEMIDE 40 MG: 40 TABLET ORAL at 09:27

## 2025-04-24 RX ADMIN — LACTULOSE 30 G: 10 SOLUTION ORAL at 12:54

## 2025-04-24 RX ADMIN — ESCITALOPRAM OXALATE 20 MG: 10 TABLET ORAL at 09:27

## 2025-04-24 RX ADMIN — Medication 5000 UNITS: at 09:27

## 2025-04-24 RX ADMIN — LACTULOSE 20 G: 20 SOLUTION ORAL at 22:08

## 2025-04-24 RX ADMIN — NADOLOL 40 MG: 20 TABLET ORAL at 09:27

## 2025-04-24 RX ADMIN — SODIUM CHLORIDE, PRESERVATIVE FREE 10 ML: 5 INJECTION INTRAVENOUS at 09:27

## 2025-04-24 RX ADMIN — VALSARTAN 40 MG: 80 TABLET ORAL at 21:58

## 2025-04-24 RX ADMIN — IOPAMIDOL 75 ML: 755 INJECTION, SOLUTION INTRAVENOUS at 10:51

## 2025-04-24 RX ADMIN — SODIUM CHLORIDE, PRESERVATIVE FREE 10 ML: 5 INJECTION INTRAVENOUS at 22:08

## 2025-04-24 NOTE — CARE COORDINATION
04/24/25 1011   IMM Letter   IMM Letter given to Patient/Family/Significant other/Guardian/POA/by: Carlos Díaz   IMM Letter date given: 04/24/25   IMM Letter time given: 1010     Patient declined waiting 4 hr period prior to discharge.  Second IMM given to patient and explained with patient verbalizing understanding.  All questions and concerns addressed     Signed letter placed in pt soft chart  Electronically signed by ROSEMARY SPANGLER on 4/24/2025 at 10:11 AM

## 2025-04-24 NOTE — PLAN OF CARE
Problem: Chronic Conditions and Co-morbidities  Goal: Patient's chronic conditions and co-morbidity symptoms are monitored and maintained or improved  4/24/2025 1534 by Leia Coto RN  Outcome: Progressing  Flowsheets (Taken 4/24/2025 0993)  Care Plan - Patient's Chronic Conditions and Co-Morbidity Symptoms are Monitored and Maintained or Improved: Monitor and assess patient's chronic conditions and comorbid symptoms for stability, deterioration, or improvement  4/24/2025 0220 by Andrés Sanabria RN  Outcome: Progressing  4/24/2025 0220 by Andrés Sanabria RN  Outcome: Progressing     Problem: Safety - Adult  Goal: Free from fall injury  4/24/2025 1534 by Leia Coto RN  Outcome: Progressing  4/24/2025 0220 by Andrés Sanabria RN  Outcome: Progressing  4/24/2025 0220 by Andrés Sanabria RN  Outcome: Progressing     Problem: ABCDS Injury Assessment  Goal: Absence of physical injury  4/24/2025 1534 by Leia Coto RN  Outcome: Progressing  4/24/2025 0220 by Andrés Sanabria RN  Outcome: Progressing  4/24/2025 0220 by Andrés Sanabria RN  Outcome: Progressing

## 2025-04-25 VITALS
HEART RATE: 63 BPM | DIASTOLIC BLOOD PRESSURE: 65 MMHG | HEIGHT: 65 IN | TEMPERATURE: 97.9 F | OXYGEN SATURATION: 94 % | RESPIRATION RATE: 18 BRPM | SYSTOLIC BLOOD PRESSURE: 129 MMHG | BODY MASS INDEX: 43.49 KG/M2 | WEIGHT: 261 LBS

## 2025-04-25 LAB
ANION GAP SERPL CALCULATED.3IONS-SCNC: 7 MMOL/L (ref 8–16)
BASOPHILS # BLD: 0 K/UL (ref 0–0.2)
BASOPHILS NFR BLD: 0.6 % (ref 0–1)
BNP BLD-MCNC: 1416 PG/ML (ref 0–449)
BUN SERPL-MCNC: 18 MG/DL (ref 8–23)
CALCIUM SERPL-MCNC: 9 MG/DL (ref 8.8–10.2)
CHLORIDE SERPL-SCNC: 101 MMOL/L (ref 98–107)
CO2 SERPL-SCNC: 30 MMOL/L (ref 22–29)
CREAT SERPL-MCNC: 1 MG/DL (ref 0.5–0.9)
EOSINOPHIL # BLD: 0.3 K/UL (ref 0–0.6)
EOSINOPHIL NFR BLD: 5.4 % (ref 0–5)
ERYTHROCYTE [DISTWIDTH] IN BLOOD BY AUTOMATED COUNT: 15.9 % (ref 11.5–14.5)
GLUCOSE BLD-MCNC: 135 MG/DL (ref 70–99)
GLUCOSE BLD-MCNC: 91 MG/DL (ref 70–99)
GLUCOSE SERPL-MCNC: 87 MG/DL (ref 70–99)
HCT VFR BLD AUTO: 34.2 % (ref 37–47)
HGB BLD-MCNC: 11.3 G/DL (ref 12–16)
IMM GRANULOCYTES # BLD: 0 K/UL
LYMPHOCYTES # BLD: 1.3 K/UL (ref 1.1–4.5)
LYMPHOCYTES NFR BLD: 25.8 % (ref 20–40)
MAGNESIUM SERPL-MCNC: 1.6 MG/DL (ref 1.6–2.4)
MCH RBC QN AUTO: 31.9 PG (ref 27–31)
MCHC RBC AUTO-ENTMCNC: 33 G/DL (ref 33–37)
MCV RBC AUTO: 96.6 FL (ref 81–99)
MONOCYTES # BLD: 0.4 K/UL (ref 0–0.9)
MONOCYTES NFR BLD: 7.8 % (ref 0–10)
NEUTROPHILS # BLD: 3 K/UL (ref 1.5–7.5)
NEUTS SEG NFR BLD: 60 % (ref 50–65)
PERFORMED ON: ABNORMAL
PERFORMED ON: NORMAL
PLATELET # BLD AUTO: 70 K/UL (ref 130–400)
PMV BLD AUTO: 11.2 FL (ref 9.4–12.3)
POTASSIUM SERPL-SCNC: 3.7 MMOL/L (ref 3.5–5.1)
RBC # BLD AUTO: 3.54 M/UL (ref 4.2–5.4)
SODIUM SERPL-SCNC: 138 MMOL/L (ref 136–145)
WBC # BLD AUTO: 5 K/UL (ref 4.8–10.8)

## 2025-04-25 PROCEDURE — 6370000000 HC RX 637 (ALT 250 FOR IP)

## 2025-04-25 PROCEDURE — 6370000000 HC RX 637 (ALT 250 FOR IP): Performed by: HOSPITALIST

## 2025-04-25 PROCEDURE — 85025 COMPLETE CBC W/AUTO DIFF WBC: CPT

## 2025-04-25 PROCEDURE — 83880 ASSAY OF NATRIURETIC PEPTIDE: CPT

## 2025-04-25 PROCEDURE — 83735 ASSAY OF MAGNESIUM: CPT

## 2025-04-25 PROCEDURE — 36415 COLL VENOUS BLD VENIPUNCTURE: CPT

## 2025-04-25 PROCEDURE — 6360000002 HC RX W HCPCS

## 2025-04-25 PROCEDURE — 82962 GLUCOSE BLOOD TEST: CPT

## 2025-04-25 PROCEDURE — 80048 BASIC METABOLIC PNL TOTAL CA: CPT

## 2025-04-25 RX ORDER — VALSARTAN 40 MG/1
40 TABLET ORAL EVERY 12 HOURS SCHEDULED
Qty: 30 TABLET | Refills: 3 | Status: SHIPPED | OUTPATIENT
Start: 2025-04-25

## 2025-04-25 RX ORDER — FUROSEMIDE 40 MG/1
40 TABLET ORAL SEE ADMIN INSTRUCTIONS
Qty: 30 TABLET | Refills: 11 | Status: SHIPPED | OUTPATIENT
Start: 2025-04-25

## 2025-04-25 RX ORDER — FUROSEMIDE 40 MG/1
40 TABLET ORAL DAILY
Status: DISCONTINUED | OUTPATIENT
Start: 2025-04-26 | End: 2025-04-25 | Stop reason: HOSPADM

## 2025-04-25 RX ADMIN — RIFAXIMIN 400 MG: 200 TABLET ORAL at 09:24

## 2025-04-25 RX ADMIN — Medication 5000 UNITS: at 09:21

## 2025-04-25 RX ADMIN — SPIRONOLACTONE 25 MG: 25 TABLET ORAL at 09:25

## 2025-04-25 RX ADMIN — NADOLOL 40 MG: 20 TABLET ORAL at 09:22

## 2025-04-25 RX ADMIN — ESCITALOPRAM OXALATE 20 MG: 10 TABLET ORAL at 09:24

## 2025-04-25 RX ADMIN — FUROSEMIDE 40 MG: 10 INJECTION, SOLUTION INTRAMUSCULAR; INTRAVENOUS at 09:27

## 2025-04-25 RX ADMIN — VALSARTAN 40 MG: 80 TABLET ORAL at 09:21

## 2025-04-25 RX ADMIN — LACTULOSE 20 G: 20 SOLUTION ORAL at 09:25

## 2025-04-25 NOTE — PROGRESS NOTES
[facility]      Patient:  Yuli Duran  YOB: 1945  Date of Service: 4/22/2025  MRN: [id number]   Acct: [id number]   Primary Care Physician: Dereje John MD  Advance Directive: Full Code  Admit Date: 4/21/2025       Hospital Day: 1  Portions of this note have been copied forward, however, changed to reflect the most current clinical status of this patient.  CHIEF COMPLAINT  shortness of breath    SUBJECTIVE:  stated \"has not really noticed improvement of breathing yet \"    Cumulative hospital course   79 year old female with MILLER cirrhosis, esophageal varices, diastolic dysfunction, hypothyroidism, HTN, Type II DM, with complaints of shortness of breath.  Patient presented for PCP follow-up yesterday found to be dyspneic and noted to have 40 pound weight gain in the past few weeks.  She had recently increased her diuretics through her GI however had worsening symptoms and was instructed to [facility] ER for further evaluation.  Noted to have worsening confusion on arrival and history limited.  Work up in ER CT abdomen pelvis cirrhosis, small volume ascites, Ammonia 165, BNP 1668.  Patient admitted to hospitalist service with consultation to cardiology.  Increased lactulose, and resumed Xifaxan with noted improvement of mentation this morning.  Lasix ongoing.  Instructed patient and staff need for strict I&O.    Objective:   VITALS:  BP (!) 131/56   Pulse 57   Temp 97.7 °F (36.5 °C) (Temporal)   Resp 18   Ht 1.651 m (5' 5\")   Wt 120.6 kg (265 lb 12.8 oz)   SpO2 96%   BMI 44.23 kg/m²   24HR INTAKE/OUTPUT:  No intake or output data in the 24 hours ending 04/22/25 1352    Physical Exam  Vitals and nursing note reviewed.   Constitutional:       General: She is not in acute distress.     Appearance: Normal appearance.   HENT:      Mouth/Throat:      Mouth: Mucous membranes are moist.      Pharynx: Oropharynx is clear.   Eyes:      Extraocular Movements: Extraocular movements intact.      
  [facility]      Patient:  Yuli Duran  YOB: 1945  Date of Service: 4/23/2025  MRN: [id number]   Acct: [id number]   Primary Care Physician: Dereje John MD  Advance Directive: Full Code  Admit Date: 4/21/2025       Hospital Day: 2  Portions of this note have been copied forward, however, changed to reflect the most current clinical status of this patient.  CHIEF COMPLAINT  shortness of breath    SUBJECTIVE: noted improvement of breathing, plan for heart catherization today all questions answered     Cumulative hospital course   79 year old female with MILLER cirrhosis, esophageal varices, diastolic dysfunction, hypothyroidism, HTN, Type II DM, with complaints of shortness of breath.  Patient presented for PCP follow-up yesterday found to be dyspneic and noted to have 40 pound weight gain in the past few weeks.  She had recently increased her diuretics through her GI however had worsening symptoms and was instructed to [facility] ER for further evaluation.  Noted to have worsening confusion on arrival and history limited.  Work up in ER CT abdomen pelvis cirrhosis, small volume ascites, Ammonia 165, BNP 1668.  Patient admitted to hospitalist service with consultation to cardiology.  Increased lactulose, and resumed Xifaxan with noted improvement of mentation.  Lasix ongoing, stable renal function.  Instructed patient and staff need for strict I&O. Abnormal EKG old anterior infarct not excluded plan for heart catherization today.     Objective:   VITALS:  BP (!) 139/57   Pulse 56   Temp 98.2 °F (36.8 °C) (Temporal)   Resp 16   Ht 1.651 m (5' 5\")   Wt 118.4 kg (261 lb)   SpO2 99%   BMI 43.43 kg/m²   24HR INTAKE/OUTPUT:    Intake/Output Summary (Last 24 hours) at 4/23/2025 1511  Last data filed at 4/23/2025 1431  Gross per 24 hour   Intake 369.47 ml   Output 1005 ml   Net -635.53 ml       Physical Exam  Vitals and nursing note reviewed.   Constitutional:       General: She is not in 
  [facility]      Patient:  Yuli Duran  YOB: 1945  Date of Service: 4/24/2025  MRN: [id number]   Acct: [id number]   Primary Care Physician: Dereje John MD  Advance Directive: Full Code  Admit Date: 4/21/2025       Hospital Day: 3  Portions of this note have been copied forward, however, changed to reflect the most current clinical status of this patient.  CHIEF COMPLAINT  shortness of breath    SUBJECTIVE: no issues overnight, resting comfortably in bed     Cumulative hospital course   79 year old female with MILLER cirrhosis, esophageal varices, diastolic dysfunction, hypothyroidism, HTN, Type II DM, with complaints of shortness of breath.  Patient presented for PCP follow-up yesterday found to be dyspneic and noted to have 40 pound weight gain in the past few weeks.  She had recently increased her diuretics through her GI however had worsening symptoms and was instructed to [facility] ER for further evaluation.  Noted to have worsening confusion on arrival and history limited.  Work up in ER CT abdomen pelvis cirrhosis, small volume ascites, Ammonia 165, BNP 1668.  Patient admitted to hospitalist service with consultation to cardiology.  Increased lactulose, and resumed Xifaxan with noted improvement of mentation.  Lasix ongoing, stable renal function.  Instructed patient and staff need for strict I&O. Abnormal EKG old anterior infarct not excluded. Heart cath 4/23 mid distal LAD 50% stenosis, medical management. CTA pulmonary no pulmonary emboli, moderate right pleural effusion. Continue diuresis net -2.7L    Objective:   VITALS:  BP (!) 148/62   Pulse 64   Temp 98.1 °F (36.7 °C) (Temporal)   Resp 16   Ht 1.651 m (5' 5\")   Wt 118.4 kg (261 lb)   SpO2 96%   BMI 43.43 kg/m²   24HR INTAKE/OUTPUT:    Intake/Output Summary (Last 24 hours) at 4/24/2025 1346  Last data filed at 4/24/2025 0834  Gross per 24 hour   Intake 479.47 ml   Output 1600 ml   Net -1120.53 ml       Physical 
4 Eyes Skin Assessment     NAME:  Yuli Duran  YOB: 1945  MEDICAL RECORD NUMBER:  [id number]    The patient is being assessed for  Admission    I agree that at least one RN has performed a thorough Head to Toe Skin Assessment on the patient. ALL assessment sites listed below have been assessed.      Areas assessed by both nurses:    Head, Face, Ears, Shoulders, Back, Chest, Arms, Elbows, Hands, Sacrum. Buttock, Coccyx, Ischium, and Legs. Feet and Heels        Does the Patient have a Wound? No noted wound(s)       Reid Prevention initiated by RN: No  Wound Care Orders initiated by RN: No    Pressure Injury (Stage 3,4, Unstageable, DTI, NWPT, and Complex wounds) if present, place Wound referral order by RN under : No    New Ostomies, if present place, Ostomy referral order under : No     Nurse 1 eSignature: Electronically signed by Suzan Hatfield RN on 4/21/25 at 9:54 PM CDT    **SHARE this note so that the co-signing nurse can place an eSignature**    Nurse 2 eSignature: Electronically signed by Valeria Dobbs RN on 4/22/25 at 12:54 AM CDT   
CLINICAL PHARMACY NOTE: MEDS TO BEDS    Total # of Prescriptions Filled: 2   The following medications were delivered to the patient:  Discharge Medication List as of 4/25/2025 12:05 PM        START taking these medications    Details   valsartan (DIOVAN) 40 MG tablet Take 1 tablet by mouth every 12 hours, Disp-30 tablet, R-3Normal         Furosemide 40mg tabs      Additional Documentation:  Patients family picked up inside pharmacy. No copay.   
Cardiology Daily Note Emery Elizabeth MD      Patient:  Yuli Duran  [id number]    Patient Active Problem List    Diagnosis Date Noted    Splenomegaly 02/08/2023    Cirrhosis of liver with ascites (HCC) 02/08/2023    Vitamin D deficiency 02/08/2023    Slow transit constipation 08/08/2022    Closed fracture of nasal bones 07/21/2022    Localized edema 07/11/2022    Thrombocytopenia 07/11/2022    Weight gain 07/11/2022    Severe obesity (BMI 35.0-39.9) with comorbidity (HCC) 05/13/2022    Hypoalbuminemia 04/21/2025    Hyperammonemia 04/21/2025    Acute on chronic diastolic (congestive) heart failure (HCC) 04/21/2025    Chest pain 04/21/2025    Heart failure with reduced ejection fraction (HCC) 04/21/2025    Anxiety     Sleep disorder     Hepatic encephalopathy (HCC) 01/07/2025    Personal history of disease of skin and subcutaneous tissue 10/23/2024    Dermatitis 10/18/2024    Lymphedema 10/18/2024    Bilateral cellulitis of lower leg 09/16/2024    Acute cough 05/13/2024    Vitiligo 04/17/2024    Chronic cellulitis 04/17/2024    Sunburn 04/17/2024    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    Persistent depressive disorder 06/05/2023    Adenomatous polyp 04/24/2023    MILLER (nonalcoholic steatohepatitis) 04/11/2022    Controlled type 2 diabetes mellitus without complication, without long-term current use of insulin (HCC) 04/11/2022    Primary hypertension 04/11/2022    Thyroid disease 04/11/2022    Insomnia 04/11/2022       Admit Date:  4/21/2025    Admission Problem List: Present on Admission:   Hypoalbuminemia   Hyperammonemia   Acute on chronic diastolic (congestive) heart failure (HCC)   Primary hypertension   Thyroid disease   MILLER (nonalcoholic steatohepatitis)   Anxiety   Sleep disorder   Hepatic encephalopathy (HCC)   Cirrhosis of liver with ascites (HCC)      Cardiac Specific Data:  Specialty Problems          Cardiology Problems    Primary hypertension        * (Principal) Acute on chronic diastolic (congestive) 
Yuli Duran arrived to room # 436.   Presented with: new onset congestive heart failure   Mental Status: Patient is oriented, alert, coherent, logical, thought processes intact, and able to concentrate and follow conversation.   Vitals:    04/21/25 2113   BP: (!) 159/75   Pulse: 55   Resp: 18   Temp:    SpO2: 98%     Patient safety contract and falls prevention contract reviewed with patient Yes.  Oriented Patient to room.  Call light within reach. Yes.  Needs, issues or concerns expressed at this time: no.      Electronically signed by Valeria Dobbs RN on 4/21/2025 at 9:20 PM    
04/21/2025   Time:    17:26         COVID-19 Results:   Internal Administration   First Dose COVID-19, PFIZER PURPLE top, DILUTE for use, (age 12 y+), 30mcg/0.3mL  04/23/2021   Second Dose COVID-19, PFIZER PURPLE top, DILUTE for use, (age 12 y+), 30mcg/0.3mL   09/02/2021       Last COVID Lab No results found for: \"SARS-COV-2\"        Abnormal labs:   Abnormal Labs Reviewed   CBC WITH AUTO DIFFERENTIAL - Abnormal; Notable for the following components:       Result Value    RBC 3.76 (*)     MCV 99.5 (*)     MCH 32.4 (*)     MCHC 32.6 (*)     RDW 16.2 (*)     Platelets 95 (*)     Eosinophils % 5.1 (*)     All other components within normal limits   COMPREHENSIVE METABOLIC PANEL W/ REFLEX TO MG FOR LOW K - Abnormal; Notable for the following components:    Creatinine 1.0 (*)     Est, Glom Filt Rate 58 (*)     Albumin 3.3 (*)     Total Bilirubin 1.8 (*)     Alkaline Phosphatase 143 (*)     AST 46 (*)     All other components within normal limits   LIPASE - Abnormal; Notable for the following components:    Lipase 73 (*)     All other components within normal limits   BRAIN NATRIURETIC PEPTIDE - Abnormal; Notable for the following components:    NT Pro-BNP 1,668 (*)     All other components within normal limits   AMMONIA - Abnormal; Notable for the following components:    Ammonia 165 (*)     All other components within normal limits   TSH - Abnormal; Notable for the following components:    TSH 0.107 (*)     All other components within normal limits   T4, FREE - Abnormal; Notable for the following components:    T4 Free 1.87 (*)     All other components within normal limits   PROTIME-INR - Abnormal; Notable for the following components:    Protime 17.5 (*)     INR 1.45 (*)     All other components within normal limits     Background  Allergies: No Known Allergies  Current Medications:   Medications Administered         iopamidol (ISOVUE-370) 76 % injection 90 mL Admin Date  04/21/2025 Action  Given Dose  90 mL

## 2025-04-25 NOTE — DISCHARGE SUMMARY
Yuli Duran  :  1945  MRN:  [id number]    Admit date:  2025  Discharge date:  25    Discharging Physician:  DR Norris     Advance Directive: Full Code    Consults: IP CONSULT TO HEART FAILURE NURSE/COORDINATOR  IP CONSULT TO CARDIOLOGY     Primary Care Physician:  Dereje John MD    Discharge Diagnoses:  Principal Problem:    Acute on chronic diastolic (congestive) heart failure (HCC)  Active Problems:    Cirrhosis of liver with ascites (HCC)    MILLER (nonalcoholic steatohepatitis)    Primary hypertension    Thyroid disease    Hepatic encephalopathy (HCC)    Hypoalbuminemia    Hyperammonemia    Anxiety    Sleep disorder    Heart failure with reduced ejection fraction (HCC)  Resolved Problems:    * No resolved hospital problems. *      Portions of this note have been copied forward, however, changed to reflect the most current clinical status of this patient.  Hospital Course:   79 year old female with MILLER cirrhosis, esophageal varices, diastolic dysfunction, hypothyroidism, HTN, Type II DM, with complaints of shortness of breath. Patient presented for PCP follow-up yesterday found to be dyspneic and noted to have 40 pound weight gain in the past few weeks.  She had recently increased her diuretics through her GI however had worsening symptoms and was instructed to [facility] ER for further evaluation.  Noted to have worsening confusion on arrival and history limited.  Work up in ER CT abdomen pelvis cirrhosis, small volume ascites, Ammonia 165, BNP 1668.  Patient admitted to hospitalist service with consultation to cardiology.  Increased lactulose, and resumed Xifaxan with noted improvement of mentation.  Lasix ongoing, stable renal function.  Instructed patient and staff need for strict I&O. Abnormal EKG old anterior infarct not excluded. Heart cath  mid distal LAD 50% stenosis, medical management. CTA pulmonary no pulmonary emboli, moderate right pleural effusion. net -2.8L.  tolerating

## 2025-04-28 ENCOUNTER — CARE COORDINATION (OUTPATIENT)
Dept: CASE MANAGEMENT | Age: 80
End: 2025-04-28

## 2025-04-28 NOTE — CARE COORDINATION
Care Transitions Note    Initial Call - Call within 2 business days of discharge: Yes    Patient Current Location:  Home: [address] [place] [address]    Care Transition Nurse contacted the family, daughter Martine, HIPAA verified  by telephone to perform post hospital discharge assessment, verified name and  as identifiers.  Provided introduction to self, and explanation of the Care Transition Nurse role.    Patient: Yuli Duran    Patient : 1945   MRN: [id number]    Reason for Admission: AMS, MILLER  Discharge Date: 25  RURS: Readmission Risk Score: 11.9      Last Discharge Facility       Date Complaint Diagnosis Description Type Department Provider    25 Swelling Altered mental status, unspecified altered mental status type ... ED to Hosp-Admission (Discharged) (ADMITTED) [facility] Scottie Norris MD; Shun, ...            Was this an external facility discharge? No    Additional needs identified to be addressed with provider   High priority: Needs HFU scheduled, daughter is reaching out to PCP for scheduling.             Method of communication with provider: chart routing.    Patients top risk factors for readmission: medical condition-[place]    Interventions to address risk factors:   Education: daily weights, keeping log, parameters  Review of patient management of conditions/medications: taking as prescribed  Communication with providers: needs HFU    Care Summary Note: Spoke with patient's daughter Martine today for initial CTN call after discharge from [facility]. She relays that patient is sleeping right now. Daughter did say patient had reviewed her meds, gone through her AVS to get her medications set up. She did get Meds to Bed before leaving hospital. She says patient is up and mobile around the home, she caught her mopping the kitchen floor yesterday. She says she is eating good, sleeping good and no issues with bowels or bladder. Discussed daily weights, and importance of

## 2025-04-29 ENCOUNTER — RESULTS FOLLOW-UP (OUTPATIENT)
Dept: INTERNAL MEDICINE | Age: 80
End: 2025-04-29

## 2025-04-29 ENCOUNTER — OFFICE VISIT (OUTPATIENT)
Dept: INTERNAL MEDICINE | Age: 80
End: 2025-04-29

## 2025-04-29 VITALS
OXYGEN SATURATION: 95 % | HEART RATE: 62 BPM | SYSTOLIC BLOOD PRESSURE: 128 MMHG | DIASTOLIC BLOOD PRESSURE: 62 MMHG | WEIGHT: 249 LBS | BODY MASS INDEX: 41.48 KG/M2 | HEIGHT: 65 IN

## 2025-04-29 DIAGNOSIS — K76.82 HEPATIC ENCEPHALOPATHY (HCC): ICD-10-CM

## 2025-04-29 DIAGNOSIS — E72.20 HYPERAMMONEMIA: ICD-10-CM

## 2025-04-29 DIAGNOSIS — I50.20 HEART FAILURE WITH REDUCED EJECTION FRACTION (HCC): ICD-10-CM

## 2025-04-29 DIAGNOSIS — E72.20 HYPERAMMONEMIA: Primary | ICD-10-CM

## 2025-04-29 LAB — AMMONIA PLAS-SCNC: 63 UMOL/L (ref 11–51)

## 2025-04-29 ASSESSMENT — ENCOUNTER SYMPTOMS
SHORTNESS OF BREATH: 0
TROUBLE SWALLOWING: 0
SORE THROAT: 0
VOMITING: 0
BLOOD IN STOOL: 0
BACK PAIN: 0
EYE ITCHING: 0
ABDOMINAL DISTENTION: 0
SINUS PRESSURE: 0
NAUSEA: 0
EYE DISCHARGE: 0
ABDOMINAL PAIN: 0
WHEEZING: 0

## 2025-04-29 NOTE — PROGRESS NOTES
arthralgias, back pain and joint swelling.   Skin:  Negative for rash and wound.   Allergic/Immunologic: Negative for environmental allergies and food allergies.   Neurological:  Positive for weakness. Negative for dizziness, tremors, syncope, numbness and headaches.   Hematological:  Negative for adenopathy.   Psychiatric/Behavioral:  Positive for confusion. Negative for agitation and hallucinations. The patient is not nervous/anxious.        Physical Exam:  Physical Exam  Constitutional:       General: She is not in acute distress.     Appearance: She is well-developed. She is not diaphoretic.   HENT:      Head: Normocephalic and atraumatic.      Right Ear: External ear normal.      Left Ear: External ear normal.      Nose: Nose normal.      Mouth/Throat:      Pharynx: No oropharyngeal exudate.   Eyes:      General: No scleral icterus.        Right eye: No discharge.         Left eye: No discharge.      Conjunctiva/sclera: Conjunctivae normal.      Pupils: Pupils are equal, round, and reactive to light.   Neck:      Thyroid: No thyromegaly.      Vascular: No JVD.      Trachea: No tracheal deviation.   Cardiovascular:      Rate and Rhythm: Normal rate and regular rhythm.      Heart sounds: Normal heart sounds. No murmur heard.     No friction rub. No gallop.   Pulmonary:      Effort: Pulmonary effort is normal. No respiratory distress.      Breath sounds: Normal breath sounds. No wheezing or rales.   Abdominal:      General: Bowel sounds are normal. There is no distension.      Palpations: Abdomen is soft. There is no mass.      Tenderness: There is no abdominal tenderness. There is no guarding or rebound.   Musculoskeletal:         General: No tenderness or deformity. Normal range of motion.      Cervical back: Normal range of motion and neck supple.   Lymphadenopathy:      Cervical: No cervical adenopathy.   Skin:     General: Skin is warm and dry.      Coloration: Skin is not pale.      Findings: No erythema or

## 2025-05-01 ENCOUNTER — CARE COORDINATION (OUTPATIENT)
Dept: CASE MANAGEMENT | Age: 80
End: 2025-05-01

## 2025-05-01 NOTE — CARE COORDINATION
Care Transitions Note    Follow Up Call     Patient Current Location:  [facility] Care Coordinator contacted the family, Martine  by telephone. Verified name and  as identifiers.    Additional needs identified to be addressed with provider   No needs identified                 Method of communication with provider: none.    Care Summary Note: Spoke with Yuli Duran daughter Martine who reported that patient is doing good. Daughter reported that patient seen her PCP on Tuesday and the visit went well, no changes to medications. Daughter stated that patient's weight today was 249.2 and she did not remember patient's weight from yesterday and she is currently in the car. Daughter stated that the doctor did say if patient gains 2 lbs over night to give an extra dose of Lasix and she will be giving patient an extra Lasix today at dinner. Daughter stated that patient is having some constipation so she will call the doctor to see what patient can take. Patient is currently taking lactulose 2 times daily. Daughter report that patient's appetite and fluid intake is good and denied any problems with bladder. Daughter reported that patient is taking all medications as ordered. Daughter denied any other needs at this time. Daughter instructed to continue to monitor s/s, reporting any that may present to MD immediately for early intervention. Patient is agreeable to f/u calls.     Plan of care updates since last contact:          Advance Care Planning   The patient has the following advanced directives on file:  Advance Directives       Power of  Living Will ACP-Advance Directive ACP-Power of     Not on File Not on File Not on File Not on File            The patient has appointed the following active healthcare agents:    Primary Decision Maker: Martine Osborn - [facility] - [phone]    Medication Review:  No changes since last call.     Patient Active Problem List   Diagnosis    MILLER (nonalcoholic

## 2025-05-02 ENCOUNTER — TELEPHONE (OUTPATIENT)
Dept: OTHER | Age: 80
End: 2025-05-02

## 2025-05-02 NOTE — TELEPHONE ENCOUNTER
Pt seen inpatient on 4/24/25.  Pulmonary CT revealed pulmonary nodule.  Radiologist recommended follow up imaging.  Report faxed to Dr. John.  Pt notified via telephone. Letter mailed.

## 2025-05-03 ENCOUNTER — APPOINTMENT (OUTPATIENT)
Dept: GENERAL RADIOLOGY | Age: 80
End: 2025-05-03
Payer: MEDICARE

## 2025-05-03 ENCOUNTER — HOSPITAL ENCOUNTER (EMERGENCY)
Age: 80
Discharge: HOME | End: 2025-05-03
Payer: MEDICARE

## 2025-05-03 VITALS
BODY MASS INDEX: 41.95 KG/M2 | DIASTOLIC BLOOD PRESSURE: 63 MMHG | TEMPERATURE: 97.5 F | RESPIRATION RATE: 14 BRPM | WEIGHT: 251.8 LBS | HEART RATE: 61 BPM | HEIGHT: 65 IN | SYSTOLIC BLOOD PRESSURE: 153 MMHG | OXYGEN SATURATION: 98 %

## 2025-05-03 DIAGNOSIS — R63.5 WEIGHT GAIN, ABNORMAL: Primary | ICD-10-CM

## 2025-05-03 LAB
ALBUMIN SERPL-MCNC: 3.2 G/DL (ref 3.5–5.2)
ALP SERPL-CCNC: 150 U/L (ref 35–104)
ALT SERPL-CCNC: 28 U/L (ref 10–35)
ANION GAP SERPL CALCULATED.3IONS-SCNC: 7 MMOL/L (ref 8–16)
AST SERPL-CCNC: 56 U/L (ref 10–35)
BASOPHILS # BLD: 0.1 K/UL (ref 0–0.2)
BASOPHILS NFR BLD: 1 % (ref 0–1)
BILIRUB SERPL-MCNC: 1.6 MG/DL (ref 0.2–1.2)
BNP BLD-MCNC: 1197 PG/ML (ref 0–449)
BUN SERPL-MCNC: 27 MG/DL (ref 8–23)
CALCIUM SERPL-MCNC: 9.3 MG/DL (ref 8.8–10.2)
CHLORIDE SERPL-SCNC: 105 MMOL/L (ref 98–107)
CO2 SERPL-SCNC: 28 MMOL/L (ref 22–29)
CREAT SERPL-MCNC: 1.2 MG/DL (ref 0.5–0.9)
EKG P AXIS: -30 DEGREES
EKG P-R INTERVAL: 192 MS
EKG Q-T INTERVAL: 460 MS
EKG QRS DURATION: 90 MS
EKG QTC CALCULATION (BAZETT): 459 MS
EKG T AXIS: 17 DEGREES
EOSINOPHIL # BLD: 0.3 K/UL (ref 0–0.6)
EOSINOPHIL NFR BLD: 6.1 % (ref 0–5)
ERYTHROCYTE [DISTWIDTH] IN BLOOD BY AUTOMATED COUNT: 16 % (ref 11.5–14.5)
GLUCOSE SERPL-MCNC: 105 MG/DL (ref 70–99)
HCT VFR BLD AUTO: 36.4 % (ref 37–47)
HGB BLD-MCNC: 12.1 G/DL (ref 12–16)
IMM GRANULOCYTES # BLD: 0 K/UL
LYMPHOCYTES # BLD: 1.2 K/UL (ref 1.1–4.5)
LYMPHOCYTES NFR BLD: 23.8 % (ref 20–40)
MCH RBC QN AUTO: 32.7 PG (ref 27–31)
MCHC RBC AUTO-ENTMCNC: 33.2 G/DL (ref 33–37)
MCV RBC AUTO: 98.4 FL (ref 81–99)
MONOCYTES # BLD: 0.4 K/UL (ref 0–0.9)
MONOCYTES NFR BLD: 7.1 % (ref 0–10)
NEUTROPHILS # BLD: 3 K/UL (ref 1.5–7.5)
NEUTS SEG NFR BLD: 61.8 % (ref 50–65)
PLATELET # BLD AUTO: 91 K/UL (ref 130–400)
PMV BLD AUTO: 10.6 FL (ref 9.4–12.3)
POTASSIUM SERPL-SCNC: 4.1 MMOL/L (ref 3.5–5)
PROT SERPL-MCNC: 6.6 G/DL (ref 6.4–8.3)
RBC # BLD AUTO: 3.7 M/UL (ref 4.2–5.4)
SODIUM SERPL-SCNC: 140 MMOL/L (ref 136–145)
TROPONIN, HIGH SENSITIVITY: 20 NG/L (ref 0–14)
WBC # BLD AUTO: 4.9 K/UL (ref 4.8–10.8)

## 2025-05-03 PROCEDURE — 96374 THER/PROPH/DIAG INJ IV PUSH: CPT

## 2025-05-03 PROCEDURE — 85025 COMPLETE CBC W/AUTO DIFF WBC: CPT

## 2025-05-03 PROCEDURE — 84484 ASSAY OF TROPONIN QUANT: CPT

## 2025-05-03 PROCEDURE — 99285 EMERGENCY DEPT VISIT HI MDM: CPT

## 2025-05-03 PROCEDURE — 36415 COLL VENOUS BLD VENIPUNCTURE: CPT

## 2025-05-03 PROCEDURE — 6370000000 HC RX 637 (ALT 250 FOR IP)

## 2025-05-03 PROCEDURE — 80053 COMPREHEN METABOLIC PANEL: CPT

## 2025-05-03 PROCEDURE — 93005 ELECTROCARDIOGRAM TRACING: CPT

## 2025-05-03 PROCEDURE — 83880 ASSAY OF NATRIURETIC PEPTIDE: CPT

## 2025-05-03 PROCEDURE — 71045 X-RAY EXAM CHEST 1 VIEW: CPT

## 2025-05-03 PROCEDURE — 6360000002 HC RX W HCPCS

## 2025-05-03 RX ORDER — NADOLOL 20 MG/1
40 TABLET ORAL ONCE
Status: COMPLETED | OUTPATIENT
Start: 2025-05-03 | End: 2025-05-03

## 2025-05-03 RX ORDER — FUROSEMIDE 10 MG/ML
40 INJECTION INTRAMUSCULAR; INTRAVENOUS ONCE
Status: COMPLETED | OUTPATIENT
Start: 2025-05-03 | End: 2025-05-03

## 2025-05-03 RX ORDER — VALSARTAN 40 MG/1
40 TABLET ORAL ONCE
Status: COMPLETED | OUTPATIENT
Start: 2025-05-03 | End: 2025-05-03

## 2025-05-03 RX ADMIN — NADOLOL 40 MG: 20 TABLET ORAL at 12:00

## 2025-05-03 RX ADMIN — FUROSEMIDE 40 MG: 10 INJECTION, SOLUTION INTRAMUSCULAR; INTRAVENOUS at 11:17

## 2025-05-03 RX ADMIN — VALSARTAN 40 MG: 40 TABLET, FILM COATED ORAL at 12:00

## 2025-05-03 NOTE — DISCHARGE INSTRUCTIONS
Today you were seen in the ER for abnormal weight gain over the last 5 days.  We did check your labs it appears they are improving since your last visit.  You were given Lasix and your oral home blood pressure medicine.  As we discussed hold your Lasix dose today and do not duplicate your hypertension medicine.  Return back to the ER if you have a 3 to 5 pound weight gain in over 24 hours or experience chest pain or shortness of breath.  I provided education that can help you learn how to monitor your weight related to your congestive heart failure.

## 2025-05-03 NOTE — ED PROVIDER NOTES
[facility] EMERGENCY DEPARTMENT  eMERGENCY dEPARTMENT eNCOUnter      Pt Name: Yuli Duran  MRN: [id number]  Birthdate 1945  Date of evaluation: 5/3/2025  Provider: KATIA Gottlieb CNP    CHIEF COMPLAINT       Chief Complaint   Patient presents with    Shortness of Breath    Weight Gain         HISTORY OF PRESENT ILLNESS   (Location/Symptom, Timing/Onset,Context/Setting, Quality, Duration, Modifying Factors, Severity)  Note limiting factors.   Yuli Duran is a 79 y.o. female who presents to the emergency department due to concerns of recent weight gain.  Patient's daughter is with her and has been monitoring her weight for since 4/29/2025 she reports a 3 pound weight gain over that time.  Patient's daughter did provide her with an extra dose of Lasix yesterday but feels that it has not helped.  Patient denies any dyspnea or chest pain at this time.  Reports the swelling in her legs have improved and is no longer pitting.  Patient has had a heart cath recently but denies any other cardiac symptoms today.  Patient states recently her blood pressure has been running on the hide side and her primary care and cardiologist are aware of this.  Patient's past medical history shows a history of diabetes, edema, cellulitis hypertension and sleep disorder.    HPI    NursingNotes were reviewed.    REVIEW OF SYSTEMS    (2-9 systems for level 4, 10 or more for level 5)     Review of Systems   Constitutional:  Positive for unexpected weight change.        3 pound weight gain over the last 5 days   All other systems reviewed and are negative.           PAST MEDICALHISTORY     Past Medical History:   Diagnosis Date    Anxiety     Bruising     Diabetes mellitus (HCC)     Edema     Fracture of nasal bone     History of cellulitis     History of constipation     medication induced    Hypertension     Non-alcoholic cirrhosis (HCC)     SDH (subdural hematoma) ([facility]) 10/30/2021    small, post fall ([facility],  TABLET BY MOUTH EVERY DAY EVERY MORNINGHistorical Med      traZODone (DESYREL) 50 MG tablet TAKE 1 TABLET BY MOUTH EVERY DAY EVERY EVENINGHistorical Med             ALLERGIES     Patient has no known allergies.    FAMILY HISTORY       Family History   Problem Relation Age of Onset    Alcohol Abuse Mother     Heart Disease Mother     Lung Cancer Mother     Abdominal aortic aneurysm Mother     Stroke Mother     Breast Cancer Maternal Aunt 72    Breast Cancer Maternal Aunt 74    Stomach Cancer Maternal Uncle 65        Great Uncle    Alcohol Abuse Maternal Grandmother     Epilepsy Maternal Grandmother     Heart Failure Maternal Grandfather     Unknown Paternal Grandmother     Unknown Paternal Grandfather     Breast Cancer Maternal Cousin     Colon Polyps Neg Hx     Colon Cancer Neg Hx           SOCIAL HISTORY       Social History     Socioeconomic History    Marital status:      Spouse name: None    Number of children: None    Years of education: None    Highest education level: None   Tobacco Use    Smoking status: Never    Smokeless tobacco: Never   Vaping Use    Vaping status: Never Used   Substance and Sexual Activity    Alcohol use: Never    Drug use: Never    Sexual activity: Not Currently     Social Drivers of Health     Financial Resource Strain: Low Risk  (4/17/2024)    Overall Financial Resource Strain (CARDIA)     Difficulty of Paying Living Expenses: Not hard at all   Food Insecurity: No Food Insecurity (4/21/2025)    Hunger Vital Sign     Worried About Running Out of Food in the Last Year: Never true     Ran Out of Food in the Last Year: Never true   Transportation Needs: No Transportation Needs (4/21/2025)    PRAPARE - Transportation     Lack of Transportation (Medical): No     Lack of Transportation (Non-Medical): No   Physical Activity: Insufficiently Active (9/19/2023)    Exercise Vital Sign     Days of Exercise per Week: 1 day     Minutes of Exercise per Session: 20 min   Housing Stability: Low

## 2025-05-05 ENCOUNTER — CARE COORDINATION (OUTPATIENT)
Dept: CASE MANAGEMENT | Age: 80
End: 2025-05-05

## 2025-05-05 ENCOUNTER — OFFICE VISIT (OUTPATIENT)
Dept: CARDIOLOGY CLINIC | Age: 80
End: 2025-05-05
Payer: MEDICARE

## 2025-05-05 VITALS
SYSTOLIC BLOOD PRESSURE: 132 MMHG | WEIGHT: 249 LBS | DIASTOLIC BLOOD PRESSURE: 84 MMHG | BODY MASS INDEX: 41.48 KG/M2 | HEART RATE: 64 BPM | HEIGHT: 65 IN

## 2025-05-05 DIAGNOSIS — I10 PRIMARY HYPERTENSION: ICD-10-CM

## 2025-05-05 DIAGNOSIS — K76.82 HEPATIC ENCEPHALOPATHY (HCC): ICD-10-CM

## 2025-05-05 DIAGNOSIS — I50.32 CHRONIC DIASTOLIC (CONGESTIVE) HEART FAILURE (HCC): ICD-10-CM

## 2025-05-05 DIAGNOSIS — K74.69 OTHER CIRRHOSIS OF LIVER (HCC): Primary | ICD-10-CM

## 2025-05-05 DIAGNOSIS — R07.9 CHEST PAIN IN ADULT: ICD-10-CM

## 2025-05-05 LAB
EKG P AXIS: -30 DEGREES
EKG P-R INTERVAL: 192 MS
EKG Q-T INTERVAL: 460 MS
EKG QRS DURATION: 90 MS
EKG QTC CALCULATION (BAZETT): 459 MS
EKG T AXIS: 17 DEGREES

## 2025-05-05 PROCEDURE — G8427 DOCREV CUR MEDS BY ELIG CLIN: HCPCS | Performed by: INTERNAL MEDICINE

## 2025-05-05 PROCEDURE — G8399 PT W/DXA RESULTS DOCUMENT: HCPCS | Performed by: INTERNAL MEDICINE

## 2025-05-05 PROCEDURE — G8417 CALC BMI ABV UP PARAM F/U: HCPCS | Performed by: INTERNAL MEDICINE

## 2025-05-05 PROCEDURE — 3075F SYST BP GE 130 - 139MM HG: CPT | Performed by: INTERNAL MEDICINE

## 2025-05-05 PROCEDURE — 3079F DIAST BP 80-89 MM HG: CPT | Performed by: INTERNAL MEDICINE

## 2025-05-05 PROCEDURE — 1123F ACP DISCUSS/DSCN MKR DOCD: CPT | Performed by: INTERNAL MEDICINE

## 2025-05-05 PROCEDURE — 1090F PRES/ABSN URINE INCON ASSESS: CPT | Performed by: INTERNAL MEDICINE

## 2025-05-05 PROCEDURE — 1111F DSCHRG MED/CURRENT MED MERGE: CPT | Performed by: INTERNAL MEDICINE

## 2025-05-05 PROCEDURE — 93010 ELECTROCARDIOGRAM REPORT: CPT | Performed by: INTERNAL MEDICINE

## 2025-05-05 PROCEDURE — 99214 OFFICE O/P EST MOD 30 MIN: CPT | Performed by: INTERNAL MEDICINE

## 2025-05-05 PROCEDURE — 1036F TOBACCO NON-USER: CPT | Performed by: INTERNAL MEDICINE

## 2025-05-05 RX ORDER — VALSARTAN 40 MG/1
40 TABLET ORAL EVERY 12 HOURS SCHEDULED
Qty: 30 TABLET | Refills: 3 | Status: SHIPPED | OUTPATIENT
Start: 2025-05-05

## 2025-05-05 RX ORDER — FUROSEMIDE 40 MG/1
40 TABLET ORAL SEE ADMIN INSTRUCTIONS
Qty: 30 TABLET | Refills: 11 | Status: SHIPPED | OUTPATIENT
Start: 2025-05-05

## 2025-05-05 ASSESSMENT — ENCOUNTER SYMPTOMS
SHORTNESS OF BREATH: 0
VOMITING: 0
DIARRHEA: 0
NAUSEA: 0
EYES NEGATIVE: 1
RESPIRATORY NEGATIVE: 1
GASTROINTESTINAL NEGATIVE: 1

## 2025-05-05 NOTE — CARE COORDINATION
Care Transitions Note    Follow Up Call     Patient Current Location:  Home: [address] [place], [place] [id number]    Care Transition Nurse contacted the family, daughter Martine  by telephone. Verified name and  as identifiers.    Additional needs identified to be addressed with provider   No needs identified                 Method of communication with provider: none.    Care Summary Note: Spoke briefly with daughter Martine today for ED f/u. Patient was brought in for weight gain, given IV Lasix and sent home. She is resting, daughter says no new meds. They are keeping log of daily weights, today was 247lbs, which is down about 4 lbs. Patient has Cardio f/u today, in an hour or so to be exact so did not tarry. She needed some refills on meds, CTN advised to wait until seen in case any changes were made to them so they could get those sent in. No other issues currently stated. CTN will follow up at a later time.     Plan of care updates since last contact:  Education: importance of daily weights, keeping f/u with providers, meds.       Assessments:  Care Transitions ED Follow Up    Care Transitions Interventions  Do you have any ongoing symptoms?: No   Did you call your PCP prior to going to the ED?: No - Did not call PCP   Are there any other complaints/concerns that you wish to tell your provider?: no   Do you understand what to report and when to return?: Yes   Are you following your discharge instructions?: Yes   Do you have all of your prescriptions and are they filled?: Yes   Were you discharged with any Home Care or Post Acute Services or do you currently have any active services?: No         Do you have any needs or concerns that I can assist you with?: No   Identified Barriers: None             Follow Up Appointment:   Reviewed upcoming appointment(s).  Future Appointments         Provider Specialty Dept Phone    2025 2:00 PM Emery Elizabeth MD Cardiology [phone]    2025 3:15 PM

## 2025-05-05 NOTE — PROGRESS NOTES
symptomatic shortness of breath, worsening edema, or weight gain over 2 pounds 30 tablet 11    valsartan (DIOVAN) 40 MG tablet Take 1 tablet by mouth every 12 hours 30 tablet 3    levothyroxine (SYNTHROID) 175 MCG tablet Take 1 tablet by mouth daily 90 tablet 1    lactulose (CHRONULAC) 10 GM/15ML solution Take 30 mLs by mouth 2 times daily      spironolactone (ALDACTONE) 25 MG tablet Take 1 tablet by mouth daily 30 tablet 3    nadolol (CORGARD) 40 MG tablet Take 1 tablet by mouth daily 30 tablet 5    vitamin D (ERGOCALCIFEROL) 1.25 MG (39588 UT) CAPS capsule Take 1 capsule by mouth Once a week at 5 PM 12 capsule 1    rifAXIMin (XIFAXAN) 550 MG tablet Take 1 tablet by mouth 2 times daily 60 tablet 11    escitalopram (LEXAPRO) 20 MG tablet TAKE 1 TABLET BY MOUTH EVERY DAY EVERY MORNING      traZODone (DESYREL) 50 MG tablet TAKE 1 TABLET BY MOUTH EVERY DAY EVERY EVENING       No current facility-administered medications for this visit.     Social History     Socioeconomic History    Marital status:      Spouse name: Not on file    Number of children: Not on file    Years of education: Not on file    Highest education level: Not on file   Occupational History    Not on file   Tobacco Use    Smoking status: Never    Smokeless tobacco: Never   Vaping Use    Vaping status: Never Used   Substance and Sexual Activity    Alcohol use: Never    Drug use: Never    Sexual activity: Not Currently   Other Topics Concern    Not on file   Social History Narrative    Not on file     Social Drivers of Health     Financial Resource Strain: Low Risk  (4/17/2024)    Overall Financial Resource Strain (CARDIA)     Difficulty of Paying Living Expenses: Not hard at all   Food Insecurity: No Food Insecurity (4/21/2025)    Hunger Vital Sign     Worried About Running Out of Food in the Last Year: Never true     Ran Out of Food in the Last Year: Never true   Transportation Needs: No Transportation Needs (4/21/2025)    PRAPARE -

## 2025-05-07 RX ORDER — LEVOTHYROXINE SODIUM 200 UG/1
200 TABLET ORAL DAILY
Qty: 30 TABLET | Refills: 3 | OUTPATIENT
Start: 2025-05-07

## 2025-05-13 ENCOUNTER — CARE COORDINATION (OUTPATIENT)
Dept: CASE MANAGEMENT | Age: 80
End: 2025-05-13

## 2025-05-13 NOTE — CARE COORDINATION
Care Transitions Note    Follow Up Call     Patient Current Location:  Home: [address] [place] [address]    Care Transition Nurse contacted the family, daughter Martine  by telephone. Verified name and  as identifiers.    Additional needs identified to be addressed with provider   No needs identified                 Method of communication with provider: none.    Care Summary Note: Spoke with patient's daughter today for f/u call. She says patient is doing well. Says her weight is down a bit and become stable. Says she has been to see Cardiology and he has recommended Cardiac Rehab. Daughter Martine is waiting for call related to this. Says patient is eating and drinking ok. She loves sweets, CTN did discuss healthier snacks in exchange or smaller portions. Seems to be progressing and working towards getting stronger. No problems or issues. CTN will follow up at a later time.     Plan of care updates since last contact:  How to regulate sweets/snacks    Assessments:  Care Transitions Subsequent and Final Call    Subsequent and Final Calls  Do you have any ongoing symptoms?: No  Have your medications changed?: No  Do you have any questions related to your medications?: No  Do you currently have any active services?: No  Do you have any needs or concerns that I can assist you with?: No  Identified Barriers: None  Care Transitions Interventions  Other Interventions:              Follow Up Appointment:   GOOD appointment attended as scheduled   Future Appointments         Provider Specialty Dept Phone    2025 11:30 AM Shanthi Jackson APRN Cardiology [phone]    2025 3:15 PM Cristiano Walker APRN Gastroenterology [phone]    10/16/2025 2:15 PM Shanice Abdi APRN Hematology and Oncology [phone]    10/16/2025 2:15 PM SCHEDULE, [facility] MED ONC MA Infusion Therapy [phone]    10/31/2025 1:00 PM Dereje John MD Internal Medicine [phone]    2025 1:30 PM Clara

## 2025-05-19 ENCOUNTER — TELEPHONE (OUTPATIENT)
Dept: INTERNAL MEDICINE | Age: 80
End: 2025-05-19

## 2025-05-19 NOTE — TELEPHONE ENCOUNTER
Martine called for maya leaving , stated she had a fall no injury.. but they would like a walker ordered.. does she need an office visit, or just the order?

## 2025-05-20 ENCOUNTER — CARE COORDINATION (OUTPATIENT)
Dept: CASE MANAGEMENT | Age: 80
End: 2025-05-20

## 2025-05-20 NOTE — CARE COORDINATION
Care Transitions Note    Follow Up Call     Attempted to reach patient for transitions of care follow up.  Unable to reach patient.      Outreach Attempts:   Multiple attempts to contact patient at phone numbers on file.     Care Summary Note: Attempted to make contact with Yuli for CT f/u call without success.  Unable to leave a message regarding intent of call and call back information.  Will try again at a later time.      Follow Up Appointment:   Future Appointments         Provider Specialty Dept Phone    5/21/2025 11:30 AM Shanthi Jackson APRN Cardiology [phone]    7/11/2025 3:15 PM Cristiano Walker, KATIA Gastroenterology [phone]    10/16/2025 2:15 PM Shanice Abdi, KATIA Hematology and Oncology [phone]    10/16/2025 2:15 PM SCHEDULE, [facility] MED ONC MA Infusion Therapy [phone]    10/31/2025 1:00 PM Dereje John MD Internal Medicine [phone]    11/6/2025 1:30 PM Emery Elizabeth MD Cardiology [phone]            Plan for follow-up call in 6-10 days based on severity of symptoms and risk factors. Plan for next call: symptom management-     Yvette Moore RN

## 2025-05-21 ENCOUNTER — OFFICE VISIT (OUTPATIENT)
Dept: CARDIOLOGY CLINIC | Age: 80
End: 2025-05-21
Payer: MEDICARE

## 2025-05-21 VITALS
DIASTOLIC BLOOD PRESSURE: 60 MMHG | HEIGHT: 65 IN | BODY MASS INDEX: 41.65 KG/M2 | WEIGHT: 250 LBS | SYSTOLIC BLOOD PRESSURE: 106 MMHG | HEART RATE: 52 BPM | OXYGEN SATURATION: 99 %

## 2025-05-21 DIAGNOSIS — R18.8 CIRRHOSIS OF LIVER WITH ASCITES, UNSPECIFIED HEPATIC CIRRHOSIS TYPE (HCC): ICD-10-CM

## 2025-05-21 DIAGNOSIS — K74.60 CIRRHOSIS OF LIVER WITH ASCITES, UNSPECIFIED HEPATIC CIRRHOSIS TYPE (HCC): ICD-10-CM

## 2025-05-21 DIAGNOSIS — I50.32 CHRONIC DIASTOLIC (CONGESTIVE) HEART FAILURE (HCC): Primary | ICD-10-CM

## 2025-05-21 DIAGNOSIS — I50.32 CHRONIC DIASTOLIC (CONGESTIVE) HEART FAILURE (HCC): ICD-10-CM

## 2025-05-21 DIAGNOSIS — I10 PRIMARY HYPERTENSION: ICD-10-CM

## 2025-05-21 PROCEDURE — 1123F ACP DISCUSS/DSCN MKR DOCD: CPT | Performed by: CLINICAL NURSE SPECIALIST

## 2025-05-21 PROCEDURE — 99214 OFFICE O/P EST MOD 30 MIN: CPT | Performed by: CLINICAL NURSE SPECIALIST

## 2025-05-21 PROCEDURE — 1036F TOBACCO NON-USER: CPT | Performed by: CLINICAL NURSE SPECIALIST

## 2025-05-21 PROCEDURE — 1090F PRES/ABSN URINE INCON ASSESS: CPT | Performed by: CLINICAL NURSE SPECIALIST

## 2025-05-21 PROCEDURE — G8427 DOCREV CUR MEDS BY ELIG CLIN: HCPCS | Performed by: CLINICAL NURSE SPECIALIST

## 2025-05-21 PROCEDURE — 3078F DIAST BP <80 MM HG: CPT | Performed by: CLINICAL NURSE SPECIALIST

## 2025-05-21 PROCEDURE — 1111F DSCHRG MED/CURRENT MED MERGE: CPT | Performed by: CLINICAL NURSE SPECIALIST

## 2025-05-21 PROCEDURE — G8399 PT W/DXA RESULTS DOCUMENT: HCPCS | Performed by: CLINICAL NURSE SPECIALIST

## 2025-05-21 PROCEDURE — 3074F SYST BP LT 130 MM HG: CPT | Performed by: CLINICAL NURSE SPECIALIST

## 2025-05-21 PROCEDURE — G8417 CALC BMI ABV UP PARAM F/U: HCPCS | Performed by: CLINICAL NURSE SPECIALIST

## 2025-05-21 RX ORDER — VALSARTAN 40 MG/1
40 TABLET ORAL EVERY 12 HOURS SCHEDULED
Qty: 60 TABLET | Refills: 5 | Status: SHIPPED | OUTPATIENT
Start: 2025-05-21

## 2025-05-21 NOTE — PATIENT INSTRUCTIONS
Labs today  Lasix dose per labs     Maintain good blood pressure control-goal<130/80 at rest  Maintain good cholesterol control LDL goal<70 with arterial disease  If you are diabetic work to keep/obtain hemoglobin A1c< 7    Follow up in 3 mos  With Dr. Elizabeth   Call with any questions or concerns  Follow up with Dereje John MD for non cardiac problems  Report any new problems  Cardiovascular Fitness-Exercise as tolerated.    Fall precautions   Cardiac / Healthy Diet- Avoid processed high fat foods, maintain low sodium/salt   Continue current medications as directed  Continue plan of treatment  It is always recommended that you bring your medications bottles with you to each visit - this is for your safety!

## 2025-05-21 NOTE — PROGRESS NOTES
[facility] Cardiology  [address] [facility] [address] [place] [address]  Phone: [phone]  Fax: [phone]    OFFICE VISIT:  2025    Yuli Duran - : 1945    Reason For Visit:  Yuli is a 80 y.o. female who is here for Follow-up (Follow up on CHF She does have some SOA)  History of MILLER cirrhosis, esophageal varices, diastolic dysfunction, hypothyroidism, hypertension, type 2 diabetes was admitted last month with shortness of breath and 40 pound weight gain.  Elevated BNP with abnormal EKG.  No PE noted with moderate right pleural effusion.  Patient underwent heart catheterization on 2025 that showed normal LV systolic function with EF 55 to 60% with normal angiograms with the exception for a smooth 50% stenosis of the mid distal LAD  Patient was diuresed and medications adjusted  She was reevaluated in the emergency room 5/3/2025 with fluid weight gain  BNP is trending down  Had follow-up with Dr. Elizabeth 2025    Suggested alternating Lasix doses with 40 mg and 80 mg in addition to her spironolactone 25 mg daily    She is here in follow up with daughter    She has been weighing daily.  She states she is up 1 pound over night but mostly has been steady.  She had a flight of fish yesterday.    Daughter reports that her confusion has gotten a little worse over the last several days.    On Friday she stood up from a chair at her desk and fell.  They tried for an hour to get her up but ended up having to call the paramedics to get her up.  Now using a rollator walker    Patient has GUERRERO.  She denies any orthopnea or PND  Trying to adhere to low-sodium diet.  Daughter doing the cooking      Subjective  Yuli denies exertional chest pain, resting shortness of breath, orthopnea, paroxysmal nocturnal dyspnea, syncope, presyncope, arrhythmia, edema and fatigue.  The patient denies numbness or weakness to suggest cerebrovascular accident or transient ischemic attack.    Dereje John MD

## 2025-05-22 ENCOUNTER — RESULTS FOLLOW-UP (OUTPATIENT)
Dept: CARDIOLOGY CLINIC | Age: 80
End: 2025-05-22

## 2025-05-22 LAB
ANION GAP SERPL CALCULATED.3IONS-SCNC: 8 MMOL/L (ref 8–16)
BNP BLD-MCNC: 1732 PG/ML (ref 0–449)
BUN SERPL-MCNC: 36 MG/DL (ref 8–23)
CALCIUM SERPL-MCNC: 9.3 MG/DL (ref 8.8–10.2)
CHLORIDE SERPL-SCNC: 101 MMOL/L (ref 98–107)
CO2 SERPL-SCNC: 28 MMOL/L (ref 22–29)
CREAT SERPL-MCNC: 1.4 MG/DL (ref 0.5–0.9)
GLUCOSE SERPL-MCNC: 104 MG/DL (ref 70–99)
POTASSIUM SERPL-SCNC: 4.4 MMOL/L (ref 3.5–5.1)
REASON FOR REJECTION: NORMAL
REJECTED TEST: NORMAL
SODIUM SERPL-SCNC: 137 MMOL/L (ref 136–145)

## 2025-05-22 NOTE — TELEPHONE ENCOUNTER
Called and spoke to pt daughter regarding lab results. She voiced understanding of advisement. Discussed low salt diet and continuing to weigh daily. Advised to call back with any questions/concerns or increase in symptoms.

## 2025-05-23 ENCOUNTER — CARE COORDINATION (OUTPATIENT)
Dept: CASE MANAGEMENT | Age: 80
End: 2025-05-23

## 2025-05-23 NOTE — CARE COORDINATION
Care Transitions Note    Follow Up Call     Patient Current Location:  Home: [address] [place] [address]    Care Transition Nurse contacted the family, daughter Martine  by telephone. Verified name and  as identifiers.    Additional needs identified to be addressed with provider   No needs identified                 Method of communication with provider: none.    Care Summary Note: Spoke with patient daughter Martine today for f/u call. She says her Mom is stable, she did have a fall on Friday, had to get EMS to come out and get her up, as her legs are weak. They have gotten 2 walkers for both levels in the home. She is preparing low sodium diet for her Mom. CTN discussed various herbs and spices and roasting of vegetables and meats. She said she is doing pretty good with that. She says she is up a half lb, CHF nurse if following along, referred by Cardiology. She says she is wanting some ice cream and foods not so good for her. CTN advised moderation, and a treat every now and again but watch portions. . She has had HFU with Cardio and her HFU. Will consider ACM referral next wee at discharge.     Plan of care updates since last contact:  Education: low sodium diet, herbs and spices without sodium, roasting vegetables      Assessments:  Care Transitions Subsequent and Final Call    Subsequent and Final Calls  Care Transitions Interventions  Other Interventions:              Follow Up Appointment:   GOOD appointment attended as scheduled   Future Appointments         Provider Specialty Dept Phone    2025 3:15 PM Cristiano Walker APRN Gastroenterology [phone]    2025 11:00 AM Emery Elizabeth MD Cardiology [phone]    10/16/2025 2:15 PM Shanice Abdi APRN Hematology and Oncology [phone]    10/16/2025 2:15 PM SCHEDULE, [facility] MED ONC MA Infusion Therapy [phone]    10/31/2025 1:00 PM Dereje John MD Internal Medicine [phone]    2025 1:30 PM Clara

## 2025-05-30 ENCOUNTER — CARE COORDINATION (OUTPATIENT)
Dept: CASE MANAGEMENT | Age: 80
End: 2025-05-30

## 2025-05-30 NOTE — CARE COORDINATION
Care Transitions Note    Follow Up Call     Patient Current Location:  Home: [address] [place], [place] [id number]    Care Transition Nurse contacted the family, daughter Martine  by telephone. Verified name and  as identifiers.    Additional needs identified to be addressed with provider   High priority: weight gain of 6 lbs in as many days. Daughter has called [facility] they are out of office and she has left message with PCP office. CTN attempted [facility] Lopez CHF nurse, N/A, NO VM. CTN attempted [facility], out of office. CTN attempted Dr. Eisenberg office, they are on lunch, N/A.  Patient is taking Lasix 40 mg daily, with SIG that states 40 mg q day, but may take BID if patient is symptomatic SOA, or weight gain of 2 lbs, or worsening edema. Daughter has not given per SIG prn dose.                  Method of communication with provider: See above    Care Summary Note: Spoke with patient's daughter Maritne today for f/u call. She says patient has gained 6 lbs in 6 days. She is taking Lasix 40 mg daily. She has SIG on her bottle for Lasix 40 mg to be given BID if weight gain of 2 lbs, edema, or symptomatically SOA. Daughter has not given the second dose to patient. Discussed dry weight every day and the need to adhere to the SIG on the bottle for as needed extra dose of Lasix. Daughter says patient is NOT SOA or swollen. Says she has a call into PCP office as [facility] is closed.     CTN did reach out to CHF nurse Amaris Lopez and N/A and no VM set up. CTN reached out to [facility], offices closed. CTN reached out to PCP office and they are closed for lunch, N/A. CTN advised her to adhere to the SIG on bottle and give second dose of Lasix today and through the weekend, as this is how it is ordered, and f/u with Cardiology for further instructions on Monday. Advised that if PCP office does get back to her to go by what they instruct her to do. Advised to go to ED if she becomes symptomatic. Daughter states

## 2025-06-02 ENCOUNTER — CARE COORDINATION (OUTPATIENT)
Dept: CASE MANAGEMENT | Age: 80
End: 2025-06-02

## 2025-06-02 ENCOUNTER — TELEPHONE (OUTPATIENT)
Dept: CARDIOLOGY CLINIC | Age: 80
End: 2025-06-02

## 2025-06-02 NOTE — TELEPHONE ENCOUNTER
Patient's sulema left message that Shanthi had decreased lasix to 40 mg daily. Monday her weight was 247.1 and by Thursday she was 253. She left on message that she called but office was already closed. She said she spoke with Adrienne Moore and was told to go back to 40 mg BID. She said that she did that increase and weight yesterday was 251.7 and today 252.5.     Adrienne Moore also left message that she spoke with daughter Friday and sent message to Dr. John to address Friday but didn't believe it had been. Daughter was supposed to call his AM but called today after work. She just wanted to make sure office followed up on concerns. Patient is SOB and she stated that daughter increased lasix to 40 mg BID as indicated on the bottle this weekend starting with Friday.

## 2025-06-02 NOTE — CARE COORDINATION
Care Transitions Note    Follow Up Call     Patient Current Location:  Home: [address] [place], [place] [id number]    Care Transition Nurse contacted the family, daughter Martine  by telephone. Verified name and  as identifiers.    Additional needs identified to be addressed with provider   Needs 6 lb weight gain to be addressed. She double the Lasix over the weekend including Friday and says only a 1 lb decrease. She gained half a lb this morning                  Method of communication with provider: phone. LM on nurse triage line.    Care Summary Note: Spoke with patient's daughter today for f/u call. She has been giving patient Lasix as sig says 40mg BID due to weight gain. She says patient has only lost 1 lb over the weekend and has gained half lb this morning. She has not heard anything from PCP office or Cardio office as Cardio was closed on Friday. No answer or VM on CHF nurse line. Daughter has not reached out to Cardio yet today, as she had to work. She is on way home and going to reach out to them. CTN placed call to Cardio office and left VM on nurse triage line. CTN will follow up with patient at a later time and see where things stand.     Plan of care updates since last contact:  Education: keeping up with daily weight, controlling sodium intake, communication with providers when something is wrong.          Medication Review:  Doubled up on Lasix for Friday, Saturday and .     Remote Patient Monitoring:  Offered patient enrollment in the Remote Patient Monitoring (RPM) program for in-home monitoring: Yes, but did not enroll at this time: limited patient ability to navigate RPM/equipment.    Assessments:  Care Transitions Subsequent and Final Call    Subsequent and Final Calls  Care Transitions Interventions  Other Interventions:              Follow Up Appointment:   Reviewed upcoming appointment(s).  Future Appointments         Provider Specialty Dept Phone    2025 3:15 PM Aaron

## 2025-06-03 ENCOUNTER — CARE COORDINATION (OUTPATIENT)
Dept: CASE MANAGEMENT | Age: 80
End: 2025-06-03

## 2025-06-03 NOTE — CARE COORDINATION
Care Transitions Note    Follow Up Call     Attempted to reach family, daughter Martine  for transitions of care follow up.  Unable to reach family,   .      Outreach Attempts:   Unable to leave message.     Care Summary Note: Attempted to make contact with Yuli for CT f/u call without success.  Unable to leave a message regarding intent of call and call back information.  Will try again at a later time.      Follow Up Appointment:   Future Appointments         Provider Specialty Dept Phone    7/11/2025 3:15 PM Cristiano Walker APRN Gastroenterology [phone]    8/21/2025 11:00 AM Emery Elizabeth MD Cardiology [phone]    10/16/2025 2:15 PM Shanice Abdi APRN Hematology and Oncology [phone]    10/16/2025 2:15 PM SCHEDULE, [facility] MED ONC MA Infusion Therapy [phone]    10/31/2025 1:00 PM Dereje John MD Internal Medicine [phone]    11/6/2025 1:30 PM Emery Elizabeth MD Cardiology [phone]            Plan for follow-up on next business day.  based on severity of symptoms and risk factors. Plan for next call: symptom management-weight gain, did she give added dose of Lasix like Cardiology said?    Yvette Moore RN

## 2025-06-03 NOTE — TELEPHONE ENCOUNTER
Continue Lasix twice daily and call and check on her and see how she is doing.  If short of breath and weight is still up then can take an extra 40  Milligrams for 1 day  Emphasized low-sodium diet

## 2025-06-03 NOTE — TELEPHONE ENCOUNTER
Left message with patient's daughter with advisement and asked for her to let us know how patient is doing.

## 2025-06-04 ENCOUNTER — CARE COORDINATION (OUTPATIENT)
Dept: CASE MANAGEMENT | Age: 80
End: 2025-06-04

## 2025-06-04 NOTE — CARE COORDINATION
Care Transitions Note    Final Call     Patient Current Location:  Home: [address] [place] [address]    Care Transition Nurse contacted the family, rocio Farley  by telephone. Verified name and  as identifiers.    Patient graduated from the Care Transitions program on today.  Patient/family reinforced resources provided during this care transition period..        Handoff:   Patient/family agreeable to ACM outreach.      Care Summary Note: Spoke with daughter Martine today for f/u call. She says patient is doing better, she heard back from Cardiology and patient is on Lasix 40mg BID and can take an extra dose if needed. She says patient is down about 3.5 lbs and not SOA. She has come to the end of CTN episode and is accepting of referral to ACM for more f/u. CTN will make ACM aware and discharge patient from CTN services today. No CTN outreach scheduled.     Assessments:  Care Transitions Subsequent and Final Call    Subsequent and Final Calls  Do you have any ongoing symptoms?: No  Have your medications changed?: No  Do you have any questions related to your medications?: No  Do you currently have any active services?: No  Do you have any needs or concerns that I can assist you with?: No  Identified Barriers: None  Care Transitions Interventions  Other Interventions:              Upcoming Appointments:    Future Appointments         Provider Specialty Dept Phone    2025 3:15 PM Cristiano Walker APRN Gastroenterology [phone]    2025 11:00 AM Emery Elizabeth MD Cardiology [phone]    10/16/2025 2:15 PM Shanice Abdi APRN Hematology and Oncology [phone]    10/16/2025 2:15 PM SCHEDULE, [facility] MED ONC MA Infusion Therapy [phone]    10/31/2025 1:00 PM Dereje John MD Internal Medicine [phone]    2025 1:30 PM Emery Elizabeth MD Cardiology [phone]            Patient has agreed to contact primary care provider and/or specialist for any further

## 2025-06-06 ENCOUNTER — CARE COORDINATION (OUTPATIENT)
Dept: CARE COORDINATION | Age: 80
End: 2025-06-06

## 2025-06-06 NOTE — CARE COORDINATION
Ambulatory Care Coordination Note     6/6/2025 2:26 PM          ACM: Zoe Matthews, RN     Care Summary Note: CTN Handoff. PMHx: HTN,CHF, DM II. Enrolled into Care Coordination at this time. ACM added to Tx team.    PCP/Specialist follow up:   Future Appointments         Provider Specialty Dept Phone    7/11/2025 3:15 PM Cristiano Walker APRN Gastroenterology [phone]    8/21/2025 11:00 AM Emery Elizabeth MD Cardiology [phone]    10/16/2025 2:15 PM Shanice Abdi APRN Hematology and Oncology [phone]    10/16/2025 2:15 PM SCHEDULE, [facility] MED ONC MA Infusion Therapy [phone]    10/31/2025 1:00 PM Dereje John MD Internal Medicine [phone]    11/6/2025 1:30 PM Emery Elizabeth MD Cardiology [phone]            Follow Up:   Plan for next ACM outreach in approximately 1 week to complete:  - CC Protocol assessments  - disease specific assessments  - SDOH assessments  - medication review  - advance care planning  - goal progression  - education   - outreach attempt to offer care management services.        Zoe Matthews BSN, RN  Respecting Choices® Advanced Steps ACP Facilitator  Ambulatory Care Manager  [facility]  [phone]

## 2025-06-13 ENCOUNTER — CARE COORDINATION (OUTPATIENT)
Dept: CARE COORDINATION | Age: 80
End: 2025-06-13

## 2025-06-13 SDOH — HEALTH STABILITY: PHYSICAL HEALTH: ON AVERAGE, HOW MANY DAYS PER WEEK DO YOU ENGAGE IN MODERATE TO STRENUOUS EXERCISE (LIKE A BRISK WALK)?: 3 DAYS

## 2025-06-13 SDOH — HEALTH STABILITY: MENTAL HEALTH: HOW OFTEN DO YOU HAVE A DRINK CONTAINING ALCOHOL?: NEVER

## 2025-06-13 SDOH — ECONOMIC STABILITY: HOUSING INSECURITY: IN THE LAST 12 MONTHS, WAS THERE A TIME WHEN YOU WERE NOT ABLE TO PAY THE MORTGAGE OR RENT ON TIME?: NO

## 2025-06-13 SDOH — SOCIAL STABILITY: SOCIAL INSECURITY: ARE YOU MARRIED, WIDOWED, DIVORCED, SEPARATED, NEVER MARRIED, OR LIVING WITH A PARTNER?: MARRIED

## 2025-06-13 SDOH — ECONOMIC STABILITY: FOOD INSECURITY: WITHIN THE PAST 12 MONTHS, YOU WORRIED THAT YOUR FOOD WOULD RUN OUT BEFORE YOU GOT THE MONEY TO BUY MORE.: NEVER TRUE

## 2025-06-13 SDOH — HEALTH STABILITY: PHYSICAL HEALTH: ON AVERAGE, HOW MANY MINUTES DO YOU ENGAGE IN EXERCISE AT THIS LEVEL?: 30 MIN

## 2025-06-13 SDOH — SOCIAL STABILITY: SOCIAL NETWORK: HOW OFTEN DO YOU GET TOGETHER WITH FRIENDS OR RELATIVES?: MORE THAN THREE TIMES A WEEK

## 2025-06-13 SDOH — ECONOMIC STABILITY: FOOD INSECURITY: WITHIN THE PAST 12 MONTHS, THE FOOD YOU BOUGHT JUST DIDN'T LAST AND YOU DIDN'T HAVE MONEY TO GET MORE.: NEVER TRUE

## 2025-06-13 SDOH — SOCIAL STABILITY: SOCIAL NETWORK
IN A TYPICAL WEEK, HOW MANY TIMES DO YOU TALK ON THE PHONE WITH FAMILY, FRIENDS, OR NEIGHBORS?: MORE THAN THREE TIMES A WEEK

## 2025-06-13 SDOH — SOCIAL STABILITY: SOCIAL NETWORK
DO YOU BELONG TO ANY CLUBS OR ORGANIZATIONS SUCH AS CHURCH GROUPS, UNIONS, FRATERNAL OR ATHLETIC GROUPS, OR SCHOOL GROUPS?: YES

## 2025-06-13 SDOH — HEALTH STABILITY: MENTAL HEALTH
DO YOU FEEL STRESS - TENSE, RESTLESS, NERVOUS, OR ANXIOUS, OR UNABLE TO SLEEP AT NIGHT BECAUSE YOUR MIND IS TROUBLED ALL THE TIME - THESE DAYS?: NOT AT ALL

## 2025-06-13 SDOH — SOCIAL STABILITY: SOCIAL NETWORK: HOW OFTEN DO YOU ATTEND MEETINGS OF THE CLUBS OR ORGANIZATIONS YOU BELONG TO?: 1 TO 4 TIMES PER YEAR

## 2025-06-13 SDOH — ECONOMIC STABILITY: FOOD INSECURITY: HOW HARD IS IT FOR YOU TO PAY FOR THE VERY BASICS LIKE FOOD, HOUSING, MEDICAL CARE, AND HEATING?: NOT HARD AT ALL

## 2025-06-13 SDOH — SOCIAL STABILITY: SOCIAL NETWORK: HOW OFTEN DO YOU ATTEND CHURCH OR RELIGIOUS SERVICES?: 1 TO 4 TIMES PER YEAR

## 2025-06-13 SDOH — ECONOMIC STABILITY: TRANSPORTATION INSECURITY: IN THE PAST 12 MONTHS, HAS LACK OF TRANSPORTATION KEPT YOU FROM MEDICAL APPOINTMENTS OR FROM GETTING MEDICATIONS?: NO

## 2025-06-13 ASSESSMENT — ACTIVITIES OF DAILY LIVING (ADL): LACK_OF_TRANSPORTATION: NO

## 2025-06-13 NOTE — CARE COORDINATION
Ambulatory Care Coordination Note     2025 10:29 AM     Patient Current Location:  Home: [address] [place] [id number]     This patient was received as a referral from Care Transition Nurse.    ACM contacted the patient and family by telephone. Verified name and  with patient and family as identifiers. Provided introduction to self, and explanation of the ACM role.   Family accepted care management services at this time.          ACM: Zoe Matthews RN     Challenges to be reviewed by the provider   Additional needs identified to be addressed with provider No                  Method of communication with provider: none.    Utilization: Patient has not had any utilization since our last call.     Care Summary Note:  ACM made outreach today, spoke to Helen briefly as pt handed phone off to Dtr Adrienne. No c/o CP, SOB, LE or ABD swelling, high or low BS at this time. Taking medications as directed with no barriers. Dtr sets up and gives meds. Pt is steady on her feet with no report of falls, has RTS and uses. Pt is independent in ADL's, lives with dtr and spouse. No barriers noted in transportation. Sees Dr. Mayfield [facility] for CHF, lkast visit on 25. Reviewed today and noted. Pt does not have AD's in place. LSW referral placed today, dtr very appreciative. Mailing fall safety, CHF and DM zones. All questions answered no other concerns at this time.    Offered patient enrollment in the Remote Patient Monitoring (RPM) program for in-home monitoring: Deferred at this time because first call; will discuss at next outreach.     Assessments Completed:       2025    10:06 AM   Amb Fall Risk Assessment and TUG Test   Do you feel unsteady or are you worried about falling?  yes   2 or more falls in past year? yes   Fall with injury in past year? no    ,   Ambulatory Care Coordination Assessment    Care Coordination Protocol  Referral from Primary Care Provider: No  Week 1 - Initial Assessment

## 2025-06-16 ENCOUNTER — OFFICE VISIT (OUTPATIENT)
Dept: INTERNAL MEDICINE | Age: 80
End: 2025-06-16
Payer: MEDICARE

## 2025-06-16 VITALS
SYSTOLIC BLOOD PRESSURE: 118 MMHG | OXYGEN SATURATION: 93 % | BODY MASS INDEX: 40.82 KG/M2 | HEART RATE: 42 BPM | DIASTOLIC BLOOD PRESSURE: 62 MMHG | HEIGHT: 65 IN | WEIGHT: 245 LBS

## 2025-06-16 DIAGNOSIS — E72.20 HYPERAMMONEMIA: ICD-10-CM

## 2025-06-16 DIAGNOSIS — K76.82 HEPATIC ENCEPHALOPATHY (HCC): ICD-10-CM

## 2025-06-16 DIAGNOSIS — E72.20 HYPERAMMONEMIA: Primary | ICD-10-CM

## 2025-06-16 LAB
ALBUMIN SERPL-MCNC: 3.2 G/DL (ref 3.5–5.2)
ALP SERPL-CCNC: 165 U/L (ref 35–104)
ALT SERPL-CCNC: 31 U/L (ref 10–35)
AMMONIA PLAS-SCNC: 67 UMOL/L (ref 11–51)
ANION GAP SERPL CALCULATED.3IONS-SCNC: 9 MMOL/L (ref 8–16)
AST SERPL-CCNC: 55 U/L (ref 10–35)
BILIRUB SERPL-MCNC: 1.6 MG/DL (ref 0.2–1.2)
BUN SERPL-MCNC: 44 MG/DL (ref 8–23)
CALCIUM SERPL-MCNC: 9.4 MG/DL (ref 8.8–10.2)
CHLORIDE SERPL-SCNC: 103 MMOL/L (ref 98–107)
CO2 SERPL-SCNC: 27 MMOL/L (ref 22–29)
CREAT SERPL-MCNC: 1.4 MG/DL (ref 0.5–0.9)
GLUCOSE SERPL-MCNC: 95 MG/DL (ref 70–99)
POTASSIUM SERPL-SCNC: 5.3 MMOL/L (ref 3.5–5.1)
PROT SERPL-MCNC: 6.9 G/DL (ref 6.4–8.3)
SODIUM SERPL-SCNC: 139 MMOL/L (ref 136–145)

## 2025-06-16 PROCEDURE — 3074F SYST BP LT 130 MM HG: CPT | Performed by: INTERNAL MEDICINE

## 2025-06-16 PROCEDURE — 99214 OFFICE O/P EST MOD 30 MIN: CPT | Performed by: INTERNAL MEDICINE

## 2025-06-16 PROCEDURE — 1123F ACP DISCUSS/DSCN MKR DOCD: CPT | Performed by: INTERNAL MEDICINE

## 2025-06-16 PROCEDURE — 1090F PRES/ABSN URINE INCON ASSESS: CPT | Performed by: INTERNAL MEDICINE

## 2025-06-16 PROCEDURE — G8427 DOCREV CUR MEDS BY ELIG CLIN: HCPCS | Performed by: INTERNAL MEDICINE

## 2025-06-16 PROCEDURE — G8399 PT W/DXA RESULTS DOCUMENT: HCPCS | Performed by: INTERNAL MEDICINE

## 2025-06-16 PROCEDURE — 1036F TOBACCO NON-USER: CPT | Performed by: INTERNAL MEDICINE

## 2025-06-16 PROCEDURE — G8417 CALC BMI ABV UP PARAM F/U: HCPCS | Performed by: INTERNAL MEDICINE

## 2025-06-16 PROCEDURE — 3078F DIAST BP <80 MM HG: CPT | Performed by: INTERNAL MEDICINE

## 2025-06-16 ASSESSMENT — ENCOUNTER SYMPTOMS
SHORTNESS OF BREATH: 0
EYE DISCHARGE: 0
SINUS PRESSURE: 0
WHEEZING: 0
SORE THROAT: 0
BLOOD IN STOOL: 0
BACK PAIN: 0
EYE ITCHING: 0
ABDOMINAL DISTENTION: 0
NAUSEA: 0
ABDOMINAL PAIN: 0
VOMITING: 0
TROUBLE SWALLOWING: 0

## 2025-06-16 NOTE — PROGRESS NOTES
Abdominal:      General: Bowel sounds are normal. There is no distension.      Palpations: Abdomen is soft. There is no mass.      Tenderness: There is no abdominal tenderness. There is no guarding or rebound.   Musculoskeletal:         General: No tenderness or deformity. Normal range of motion.      Cervical back: Normal range of motion and neck supple.   Lymphadenopathy:      Cervical: No cervical adenopathy.   Skin:     General: Skin is warm and dry.      Coloration: Skin is not pale.      Findings: No erythema or rash.   Neurological:      Mental Status: She is alert and oriented to person, place, and time.      Cranial Nerves: No cranial nerve deficit.      Motor: No abnormal muscle tone.      Coordination: Coordination normal.      Deep Tendon Reflexes: Reflexes are normal and symmetric. Reflexes normal.   Psychiatric:         Behavior: Behavior normal.         Thought Content: Thought content normal.         Judgment: Judgment normal.          Assessment:      Diagnosis Orders   1. Hyperammonemia        2. Hepatic encephalopathy (HCC)              Assessment & Plan   Plan:     Patient has a history of hepatorenal failure.  She has been increasingly confused and she has had some visual disturbance over the weekend.  Family states she was only able to see the color blue and a rainbow.  She also was noted to have some bluish tent to her fingernails.  I am sending her to the lab to get a BMP CMP on her make sure that her kidney function is working as well as it should be and get a serum ammonia level on her as she has hepatic failure and she gets confused and has difficulties when her numbers get high.  Will get those results back and go from there if they are not all normal then we will probably set her up for an ophthalmologically referral.    No follow-ups on file.     Patient given educational materials- see patient instructions.  Discussed use, benefit, and side effects of prescribedmedications.  All

## 2025-06-18 ENCOUNTER — CARE COORDINATION (OUTPATIENT)
Dept: CARE COORDINATION | Age: 80
End: 2025-06-18

## 2025-06-18 NOTE — CARE COORDINATION
Advance Care Planning Note      Date: 6/18/2025    Patient Current Location:  [place]    ACP Specialist:  ROSEMARY Johnson, PAVITHRA    Date Referral Received:6/13/25    Initial Outreach:     Attempted outreach call to patient in follow-up to referral from: Ambulatory Care Manager   requesting assistance with new advance directive completion. Unable to reach patient.    Outreach Attempts:   HIPAA compliant voicemail left for dtr, Helen.     Follow Up:   Plan for next outreach in two weeks.        Thank you for this referral.

## 2025-06-26 ENCOUNTER — CARE COORDINATION (OUTPATIENT)
Dept: CARE COORDINATION | Age: 80
End: 2025-06-26

## 2025-06-26 NOTE — CARE COORDINATION
Ambulatory Care Coordination Note     2025 9:04 AM     Patient Current Location:  Home: [address] [place] [address]     ACM contacted the patient by telephone. Verified name and  with patient as identifiers.         ACM: Zoe Matthews RN     Challenges to be reviewed by the provider   Additional needs identified to be addressed with provider No                  Method of communication with provider: none.    Utilization: Patient has not had any utilization since our last call.     Care Summary Note:  ACM made outreach today, spoke to Helen. Pt denies CP, SOB, LE or ABD swelling, high or low BS S/S at this time. Steady on her feet with no report of falls. Taking medications as directed with no barriers. Pt had had PCP apt a few days prior, ACM reviewed today and noted. Upcoming apts reviewed today with pt, and pt VU. All questions answered, no other concerns at this time.     Offered patient enrollment in the Remote Patient Monitoring (RPM) program for in-home monitoring: Patient is not eligible for RPM program because: insurance coverage.     Assessments Completed:   Diabetes Assessment    Medic Alert ID: No  Meal Planning: None   How often do you test your blood sugar?: No Testing   Do you have barriers with adherence to non-pharmacologic self-management interventions? (Nutrition/Exercise/Self-Monitoring): No   Have you ever had to go to the ED for symptoms of low blood sugar?: No       Do you have hyperglycemia symptoms?: No   Do you have hypoglycemia symptoms?: No   Blood Sugar Monitoring Regimen: Not Testing         ,   Congestive Heart Failure Assessment    Are you currently restricting fluids?: No Restriction  Do you understand a low sodium diet?: Yes  Do you understand how to read food labels?: Yes  How many restaurant meals do you eat per week?: 1-2  Do you salt your food before tasting it?: No         Symptoms:  None: Yes            ,   Hypertension - Encounter Level          ,

## 2025-06-27 ENCOUNTER — RESULTS FOLLOW-UP (OUTPATIENT)
Dept: INTERNAL MEDICINE | Age: 80
End: 2025-06-27

## 2025-06-28 ENCOUNTER — HOSPITAL ENCOUNTER (EMERGENCY)
Age: 80
Discharge: HOME | End: 2025-06-28
Attending: EMERGENCY MEDICINE
Payer: MEDICARE

## 2025-06-28 VITALS
TEMPERATURE: 98.8 F | WEIGHT: 244 LBS | OXYGEN SATURATION: 100 % | RESPIRATION RATE: 18 BRPM | DIASTOLIC BLOOD PRESSURE: 94 MMHG | SYSTOLIC BLOOD PRESSURE: 145 MMHG | BODY MASS INDEX: 40.6 KG/M2 | HEART RATE: 57 BPM

## 2025-06-28 DIAGNOSIS — L03.116 BILATERAL LOWER LEG CELLULITIS: Primary | ICD-10-CM

## 2025-06-28 DIAGNOSIS — L98.491 SUPERFICIAL ULCER OF SKIN (HCC): ICD-10-CM

## 2025-06-28 DIAGNOSIS — L03.115 BILATERAL LOWER LEG CELLULITIS: Primary | ICD-10-CM

## 2025-06-28 PROCEDURE — 99283 EMERGENCY DEPT VISIT LOW MDM: CPT

## 2025-06-28 RX ORDER — CEPHALEXIN 500 MG/1
500 CAPSULE ORAL 3 TIMES DAILY
Qty: 30 CAPSULE | Refills: 0 | Status: ON HOLD | OUTPATIENT
Start: 2025-06-28 | End: 2025-07-06 | Stop reason: HOSPADM

## 2025-06-28 RX ORDER — CLOTRIMAZOLE AND BETAMETHASONE DIPROPIONATE 10; .64 MG/G; MG/G
CREAM TOPICAL
Qty: 1 EACH | Refills: 1 | Status: SHIPPED | OUTPATIENT
Start: 2025-06-28

## 2025-06-28 RX ORDER — MUPIROCIN 2 %
OINTMENT (GRAM) TOPICAL
Qty: 1 EACH | Refills: 2 | Status: ON HOLD | OUTPATIENT
Start: 2025-06-28 | End: 2025-07-06 | Stop reason: HOSPADM

## 2025-06-28 ASSESSMENT — ENCOUNTER SYMPTOMS
ABDOMINAL PAIN: 0
COUGH: 0
SHORTNESS OF BREATH: 0

## 2025-06-28 NOTE — ED PROVIDER NOTES
[facility] EMERGENCY DEPARTMENT  eMERGENCY dEPARTMENT eNCOUnter      Pt Name: Yuli Duran  MRN: [id number]  Birthdate 1945  Date of evaluation: 6/28/2025  Provider: Jose Rios MD    CHIEF COMPLAINT       Chief Complaint   Patient presents with    Wound Check     To nereida legs       HISTORY OF PRESENT ILLNESS   (Location/Symptom, Timing/Onset,Context/Setting, Quality, Duration, Modifying Factors, Severity)  Note limiting factors.   Yuli Duran is a 80 y.o. female who presents to the emergency department with wounds to the bilateral shins.  The patient's family tells me that she has some liver issues.  She is not confused today she is at her normal baseline.  The patient and family noticed that she had some ulcerations to her shin the left worse than the right with some more redness than usual she has had no fevers.  She is not on antibiotic she has been treated in the past for these she is tells me she has been to wound care but did not qualify for treatment.  The patient's  brings her in so we can look at the wounds on her legs today she otherwise feels fine and again is not acutely ill with fever or chills.    The history is provided by the patient, the spouse and medical records.       NursingNotes were reviewed.    REVIEW OF SYSTEMS    (2-9 systems for level 4, 10 or more for level 5)     Review of Systems   Constitutional:  Negative for chills and fever.   Respiratory:  Negative for cough and shortness of breath.    Cardiovascular:  Negative for chest pain.   Gastrointestinal:  Negative for abdominal pain.   Skin:  Positive for rash and wound.   Neurological:  Negative for seizures.   Psychiatric/Behavioral:  Negative for confusion.        A complete review of systems was performed and is negative except as noted above in the HPI.       PAST MEDICAL HISTORY     Past Medical History:   Diagnosis Date    Anxiety     Bruising     Diabetes mellitus (HCC)     Edema     Fracture of nasal

## 2025-06-28 NOTE — DISCHARGE INSTRUCTIONS
Put the mupirocin on the redness and ulcer area    Put the clotrimazole on the area with scale       Return to ER for fever > 101

## 2025-06-30 RX ORDER — LEVOTHYROXINE SODIUM 175 UG/1
175 TABLET ORAL DAILY
Qty: 90 TABLET | Refills: 1 | Status: SHIPPED | OUTPATIENT
Start: 2025-06-30 | End: 2025-07-04

## 2025-06-30 NOTE — TELEPHONE ENCOUNTER
Decrease to 175 on 04/21 apt Last OV 6/16/2025  Next OV 10/31/2025      Requested Prescriptions     Pending Prescriptions Disp Refills    levothyroxine (SYNTHROID) 175 MCG tablet [Pharmacy Med Name: LEVOTHYROXINE 200 MCG TABLET] 90 tablet 1     Sig: Take 1 tablet by mouth daily

## 2025-07-03 ENCOUNTER — APPOINTMENT (OUTPATIENT)
Dept: CT IMAGING | Age: 80
DRG: 442 | End: 2025-07-03
Payer: MEDICARE

## 2025-07-03 ENCOUNTER — HOSPITAL ENCOUNTER (INPATIENT)
Age: 80
LOS: 2 days | Discharge: HOME | DRG: 442 | End: 2025-07-06
Attending: STUDENT IN AN ORGANIZED HEALTH CARE EDUCATION/TRAINING PROGRAM | Admitting: STUDENT IN AN ORGANIZED HEALTH CARE EDUCATION/TRAINING PROGRAM
Payer: MEDICARE

## 2025-07-03 DIAGNOSIS — K74.69 OTHER CIRRHOSIS OF LIVER (HCC): ICD-10-CM

## 2025-07-03 DIAGNOSIS — K76.82 HEPATIC ENCEPHALOPATHY (HCC): ICD-10-CM

## 2025-07-03 DIAGNOSIS — R60.0 LOCALIZED EDEMA: ICD-10-CM

## 2025-07-03 DIAGNOSIS — T59.891A HYPERAMMONEMIC ENCEPHALOPATHY: Primary | ICD-10-CM

## 2025-07-03 DIAGNOSIS — G92.8 HYPERAMMONEMIC ENCEPHALOPATHY: Primary | ICD-10-CM

## 2025-07-03 PROCEDURE — 70450 CT HEAD/BRAIN W/O DYE: CPT

## 2025-07-03 PROCEDURE — 99285 EMERGENCY DEPT VISIT HI MDM: CPT

## 2025-07-03 RX ORDER — FUROSEMIDE 40 MG/1
40 TABLET ORAL ONCE
Status: COMPLETED | OUTPATIENT
Start: 2025-07-03 | End: 2025-07-04

## 2025-07-04 ENCOUNTER — APPOINTMENT (OUTPATIENT)
Dept: VASCULAR LAB | Age: 80
DRG: 442 | End: 2025-07-04
Attending: INTERNAL MEDICINE
Payer: MEDICARE

## 2025-07-04 LAB
ALBUMIN SERPL-MCNC: 3.4 G/DL (ref 3.5–5.2)
ALP SERPL-CCNC: 157 U/L (ref 35–104)
ALT SERPL-CCNC: 29 U/L (ref 10–35)
AMMONIA PLAS-SCNC: 112 UMOL/L (ref 11–51)
ANION GAP SERPL CALCULATED.3IONS-SCNC: 9 MMOL/L (ref 8–16)
AST SERPL-CCNC: 58 U/L (ref 10–35)
B PARAP IS1001 DNA NPH QL NAA+NON-PROBE: NOT DETECTED
B PERT.PT PRMT NPH QL NAA+NON-PROBE: NOT DETECTED
BASOPHILS # BLD: 0 K/UL (ref 0–0.2)
BASOPHILS NFR BLD: 0.6 % (ref 0–1)
BILIRUB SERPL-MCNC: 1.5 MG/DL (ref 0.2–1.2)
BILIRUB UR QL STRIP: NEGATIVE
BNP BLD-MCNC: 1645 PG/ML (ref 0–449)
BUN SERPL-MCNC: 47 MG/DL (ref 8–23)
C PNEUM DNA NPH QL NAA+NON-PROBE: NOT DETECTED
CALCIUM SERPL-MCNC: 9.7 MG/DL (ref 8.8–10.2)
CHLORIDE SERPL-SCNC: 101 MMOL/L (ref 98–107)
CLARITY UR: CLEAR
CO2 SERPL-SCNC: 25 MMOL/L (ref 22–29)
COLOR UR: YELLOW
CREAT SERPL-MCNC: 1.7 MG/DL (ref 0.5–0.9)
EOSINOPHIL # BLD: 0.2 K/UL (ref 0–0.6)
EOSINOPHIL NFR BLD: 4.3 % (ref 0–5)
ERYTHROCYTE [DISTWIDTH] IN BLOOD BY AUTOMATED COUNT: 15.1 % (ref 11.5–14.5)
FLUAV RNA NPH QL NAA+NON-PROBE: NOT DETECTED
FLUBV RNA NPH QL NAA+NON-PROBE: NOT DETECTED
GLUCOSE SERPL-MCNC: 102 MG/DL (ref 70–99)
GLUCOSE UR STRIP.AUTO-MCNC: NEGATIVE MG/DL
HADV DNA NPH QL NAA+NON-PROBE: NOT DETECTED
HCOV 229E RNA NPH QL NAA+NON-PROBE: NOT DETECTED
HCOV HKU1 RNA NPH QL NAA+NON-PROBE: NOT DETECTED
HCOV NL63 RNA NPH QL NAA+NON-PROBE: NOT DETECTED
HCOV OC43 RNA NPH QL NAA+NON-PROBE: NOT DETECTED
HCT VFR BLD AUTO: 35 % (ref 37–47)
HGB BLD-MCNC: 11.9 G/DL (ref 12–16)
HGB UR STRIP.AUTO-MCNC: NEGATIVE MG/L
HMPV RNA NPH QL NAA+NON-PROBE: NOT DETECTED
HPIV1 RNA NPH QL NAA+NON-PROBE: NOT DETECTED
HPIV2 RNA NPH QL NAA+NON-PROBE: NOT DETECTED
HPIV3 RNA NPH QL NAA+NON-PROBE: NOT DETECTED
HPIV4 RNA NPH QL NAA+NON-PROBE: NOT DETECTED
IMM GRANULOCYTES # BLD: 0 K/UL
KETONES UR STRIP.AUTO-MCNC: NEGATIVE MG/DL
LEUKOCYTE ESTERASE UR QL STRIP.AUTO: NEGATIVE
LYMPHOCYTES # BLD: 1.2 K/UL (ref 1.1–4.5)
LYMPHOCYTES NFR BLD: 25.4 % (ref 20–40)
M PNEUMO DNA NPH QL NAA+NON-PROBE: NOT DETECTED
MCH RBC QN AUTO: 33.6 PG (ref 27–31)
MCHC RBC AUTO-ENTMCNC: 34 G/DL (ref 33–37)
MCV RBC AUTO: 98.9 FL (ref 81–99)
MONOCYTES # BLD: 0.4 K/UL (ref 0–0.9)
MONOCYTES NFR BLD: 7.7 % (ref 0–10)
NEUTROPHILS # BLD: 2.9 K/UL (ref 1.5–7.5)
NEUTS SEG NFR BLD: 61.6 % (ref 50–65)
NITRITE UR QL STRIP.AUTO: NEGATIVE
PH UR STRIP.AUTO: 7 [PH] (ref 5–8)
PLATELET # BLD AUTO: 72 K/UL (ref 130–400)
PMV BLD AUTO: 10.6 FL (ref 9.4–12.3)
POTASSIUM SERPL-SCNC: 4.7 MMOL/L (ref 3.5–5)
PROT SERPL-MCNC: 7 G/DL (ref 6.4–8.3)
PROT UR STRIP.AUTO-MCNC: NEGATIVE MG/DL
RBC # BLD AUTO: 3.54 M/UL (ref 4.2–5.4)
RSV RNA NPH QL NAA+NON-PROBE: NOT DETECTED
RV+EV RNA NPH QL NAA+NON-PROBE: NOT DETECTED
SARS-COV-2 RNA NPH QL NAA+NON-PROBE: NOT DETECTED
SODIUM SERPL-SCNC: 135 MMOL/L (ref 136–145)
SP GR UR STRIP.AUTO: 1.01 (ref 1–1.03)
TROPONIN, HIGH SENSITIVITY: 23 NG/L (ref 0–14)
TROPONIN, HIGH SENSITIVITY: 24 NG/L (ref 0–14)
UROBILINOGEN UR STRIP.AUTO-MCNC: 0.2 E.U./DL
WBC # BLD AUTO: 4.7 K/UL (ref 4.8–10.8)

## 2025-07-04 PROCEDURE — 93005 ELECTROCARDIOGRAM TRACING: CPT

## 2025-07-04 PROCEDURE — 0202U NFCT DS 22 TRGT SARS-COV-2: CPT

## 2025-07-04 PROCEDURE — 6360000002 HC RX W HCPCS: Performed by: INTERNAL MEDICINE

## 2025-07-04 PROCEDURE — 6370000000 HC RX 637 (ALT 250 FOR IP)

## 2025-07-04 PROCEDURE — 2580000003 HC RX 258: Performed by: INTERNAL MEDICINE

## 2025-07-04 PROCEDURE — 2500000003 HC RX 250 WO HCPCS: Performed by: STUDENT IN AN ORGANIZED HEALTH CARE EDUCATION/TRAINING PROGRAM

## 2025-07-04 PROCEDURE — 83880 ASSAY OF NATRIURETIC PEPTIDE: CPT

## 2025-07-04 PROCEDURE — 81003 URINALYSIS AUTO W/O SCOPE: CPT

## 2025-07-04 PROCEDURE — 80053 COMPREHEN METABOLIC PANEL: CPT

## 2025-07-04 PROCEDURE — 2580000003 HC RX 258: Performed by: STUDENT IN AN ORGANIZED HEALTH CARE EDUCATION/TRAINING PROGRAM

## 2025-07-04 PROCEDURE — 6370000000 HC RX 637 (ALT 250 FOR IP): Performed by: STUDENT IN AN ORGANIZED HEALTH CARE EDUCATION/TRAINING PROGRAM

## 2025-07-04 PROCEDURE — 1200000000 HC SEMI PRIVATE

## 2025-07-04 PROCEDURE — 36415 COLL VENOUS BLD VENIPUNCTURE: CPT

## 2025-07-04 PROCEDURE — 82140 ASSAY OF AMMONIA: CPT

## 2025-07-04 PROCEDURE — 85025 COMPLETE CBC W/AUTO DIFF WBC: CPT

## 2025-07-04 PROCEDURE — 6370000000 HC RX 637 (ALT 250 FOR IP): Performed by: PEDIATRICS

## 2025-07-04 PROCEDURE — 93970 EXTREMITY STUDY: CPT

## 2025-07-04 PROCEDURE — 84484 ASSAY OF TROPONIN QUANT: CPT

## 2025-07-04 PROCEDURE — 6370000000 HC RX 637 (ALT 250 FOR IP): Performed by: INTERNAL MEDICINE

## 2025-07-04 RX ORDER — LACTULOSE 10 G/15ML
20 SOLUTION ORAL 3 TIMES DAILY
Status: DISCONTINUED | OUTPATIENT
Start: 2025-07-04 | End: 2025-07-04

## 2025-07-04 RX ORDER — ACETAMINOPHEN 650 MG/1
650 SUPPOSITORY RECTAL EVERY 6 HOURS PRN
Status: DISCONTINUED | OUTPATIENT
Start: 2025-07-04 | End: 2025-07-04

## 2025-07-04 RX ORDER — ESCITALOPRAM OXALATE 10 MG/1
5 TABLET ORAL DAILY
Status: DISCONTINUED | OUTPATIENT
Start: 2025-07-04 | End: 2025-07-06 | Stop reason: HOSPADM

## 2025-07-04 RX ORDER — LACTULOSE 10 G/15ML
20 SOLUTION ORAL
Status: DISCONTINUED | OUTPATIENT
Start: 2025-07-04 | End: 2025-07-04

## 2025-07-04 RX ORDER — ONDANSETRON 2 MG/ML
4 INJECTION INTRAMUSCULAR; INTRAVENOUS EVERY 6 HOURS PRN
Status: DISCONTINUED | OUTPATIENT
Start: 2025-07-04 | End: 2025-07-06 | Stop reason: HOSPADM

## 2025-07-04 RX ORDER — POLYETHYLENE GLYCOL 3350 17 G/17G
17 POWDER, FOR SOLUTION ORAL DAILY PRN
Status: DISCONTINUED | OUTPATIENT
Start: 2025-07-04 | End: 2025-07-06 | Stop reason: HOSPADM

## 2025-07-04 RX ORDER — NADOLOL 20 MG/1
20 TABLET ORAL DAILY
Status: DISCONTINUED | OUTPATIENT
Start: 2025-07-04 | End: 2025-07-06 | Stop reason: HOSPADM

## 2025-07-04 RX ORDER — FUROSEMIDE 40 MG/1
40 TABLET ORAL 2 TIMES DAILY
Qty: 28 TABLET | Refills: 0 | Status: SHIPPED | OUTPATIENT
Start: 2025-07-04 | End: 2025-07-06

## 2025-07-04 RX ORDER — MAGNESIUM SULFATE IN WATER 40 MG/ML
2000 INJECTION, SOLUTION INTRAVENOUS PRN
Status: DISCONTINUED | OUTPATIENT
Start: 2025-07-04 | End: 2025-07-06 | Stop reason: HOSPADM

## 2025-07-04 RX ORDER — TRAZODONE HYDROCHLORIDE 50 MG/1
50 TABLET ORAL NIGHTLY
Status: DISCONTINUED | OUTPATIENT
Start: 2025-07-04 | End: 2025-07-06 | Stop reason: HOSPADM

## 2025-07-04 RX ORDER — ERGOCALCIFEROL 1.25 MG/1
50000 CAPSULE, LIQUID FILLED ORAL WEEKLY
Status: DISCONTINUED | OUTPATIENT
Start: 2025-07-10 | End: 2025-07-06 | Stop reason: HOSPADM

## 2025-07-04 RX ORDER — SODIUM CHLORIDE 9 MG/ML
INJECTION, SOLUTION INTRAVENOUS PRN
Status: DISCONTINUED | OUTPATIENT
Start: 2025-07-04 | End: 2025-07-06 | Stop reason: HOSPADM

## 2025-07-04 RX ORDER — POTASSIUM CHLORIDE 1500 MG/1
40 TABLET, EXTENDED RELEASE ORAL PRN
Status: DISCONTINUED | OUTPATIENT
Start: 2025-07-04 | End: 2025-07-06 | Stop reason: HOSPADM

## 2025-07-04 RX ORDER — POTASSIUM CHLORIDE 7.45 MG/ML
10 INJECTION INTRAVENOUS PRN
Status: DISCONTINUED | OUTPATIENT
Start: 2025-07-04 | End: 2025-07-06 | Stop reason: HOSPADM

## 2025-07-04 RX ORDER — SODIUM CHLORIDE 9 MG/ML
INJECTION, SOLUTION INTRAVENOUS CONTINUOUS
Status: DISCONTINUED | OUTPATIENT
Start: 2025-07-04 | End: 2025-07-04

## 2025-07-04 RX ORDER — ACETAMINOPHEN 325 MG/1
650 TABLET ORAL EVERY 6 HOURS PRN
Status: DISCONTINUED | OUTPATIENT
Start: 2025-07-04 | End: 2025-07-04

## 2025-07-04 RX ORDER — LACTULOSE 10 G/15ML
30 SOLUTION ORAL ONCE
Status: COMPLETED | OUTPATIENT
Start: 2025-07-04 | End: 2025-07-04

## 2025-07-04 RX ORDER — LACTULOSE 10 G/15ML
20 SOLUTION ORAL 3 TIMES DAILY
Status: DISCONTINUED | OUTPATIENT
Start: 2025-07-05 | End: 2025-07-05

## 2025-07-04 RX ORDER — ONDANSETRON 4 MG/1
4 TABLET, ORALLY DISINTEGRATING ORAL EVERY 8 HOURS PRN
Status: DISCONTINUED | OUTPATIENT
Start: 2025-07-04 | End: 2025-07-06 | Stop reason: HOSPADM

## 2025-07-04 RX ORDER — CEPHALEXIN 500 MG/1
500 CAPSULE ORAL 3 TIMES DAILY
Status: DISCONTINUED | OUTPATIENT
Start: 2025-07-04 | End: 2025-07-04

## 2025-07-04 RX ORDER — SODIUM CHLORIDE 0.9 % (FLUSH) 0.9 %
5-40 SYRINGE (ML) INJECTION PRN
Status: DISCONTINUED | OUTPATIENT
Start: 2025-07-04 | End: 2025-07-06 | Stop reason: HOSPADM

## 2025-07-04 RX ORDER — SODIUM CHLORIDE 0.9 % (FLUSH) 0.9 %
5-40 SYRINGE (ML) INJECTION EVERY 12 HOURS SCHEDULED
Status: DISCONTINUED | OUTPATIENT
Start: 2025-07-04 | End: 2025-07-06 | Stop reason: HOSPADM

## 2025-07-04 RX ADMIN — LEVOTHYROXINE SODIUM 175 MCG: 0.12 TABLET ORAL at 08:22

## 2025-07-04 RX ADMIN — TRAZODONE HYDROCHLORIDE 50 MG: 50 TABLET ORAL at 20:45

## 2025-07-04 RX ADMIN — LACTULOSE 20 G: 20 SOLUTION ORAL at 14:01

## 2025-07-04 RX ADMIN — LACTULOSE 20 G: 20 SOLUTION ORAL at 11:46

## 2025-07-04 RX ADMIN — FUROSEMIDE 40 MG: 40 TABLET ORAL at 00:06

## 2025-07-04 RX ADMIN — LACTULOSE 20 G: 20 SOLUTION ORAL at 10:21

## 2025-07-04 RX ADMIN — LACTULOSE 30 G: 20 SOLUTION ORAL at 00:53

## 2025-07-04 RX ADMIN — LACTULOSE 20 G: 20 SOLUTION ORAL at 17:25

## 2025-07-04 RX ADMIN — SODIUM CHLORIDE: 0.9 INJECTION, SOLUTION INTRAVENOUS at 03:47

## 2025-07-04 RX ADMIN — CEFEPIME 2000 MG: 2 INJECTION, POWDER, FOR SOLUTION INTRAVENOUS at 10:46

## 2025-07-04 RX ADMIN — DOXYCYCLINE 100 MG: 100 INJECTION, POWDER, LYOPHILIZED, FOR SOLUTION INTRAVENOUS at 20:42

## 2025-07-04 RX ADMIN — ESCITALOPRAM OXALATE 5 MG: 10 TABLET ORAL at 08:22

## 2025-07-04 RX ADMIN — NADOLOL 20 MG: 20 TABLET ORAL at 10:21

## 2025-07-04 RX ADMIN — SODIUM CHLORIDE, PRESERVATIVE FREE 10 ML: 5 INJECTION INTRAVENOUS at 20:46

## 2025-07-04 RX ADMIN — CEPHALEXIN 500 MG: 500 CAPSULE ORAL at 08:22

## 2025-07-04 RX ADMIN — RIFAXIMIN 400 MG: 200 TABLET ORAL at 14:01

## 2025-07-04 RX ADMIN — LACTULOSE 20 G: 20 SOLUTION ORAL at 08:22

## 2025-07-04 RX ADMIN — LACTULOSE 20 G: 20 SOLUTION ORAL at 16:16

## 2025-07-04 RX ADMIN — CEFEPIME 2000 MG: 2 INJECTION, POWDER, FOR SOLUTION INTRAVENOUS at 22:10

## 2025-07-04 RX ADMIN — DOXYCYCLINE 100 MG: 100 INJECTION, POWDER, LYOPHILIZED, FOR SOLUTION INTRAVENOUS at 11:43

## 2025-07-04 RX ADMIN — RIFAXIMIN 400 MG: 200 TABLET ORAL at 08:22

## 2025-07-04 RX ADMIN — RIFAXIMIN 400 MG: 200 TABLET ORAL at 20:45

## 2025-07-04 NOTE — PLAN OF CARE
Problem: Chronic Conditions and Co-morbidities  Goal: Patient's chronic conditions and co-morbidity symptoms are monitored and maintained or improved  Outcome: Progressing  Flowsheets  Taken 7/4/2025 1126 by Fernanda Mendenhall RN  Care Plan - Patient's Chronic Conditions and Co-Morbidity Symptoms are Monitored and Maintained or Improved: Monitor and assess patient's chronic conditions and comorbid symptoms for stability, deterioration, or improvement  Taken 7/4/2025 0401 by Chrissie Souza RN  Care Plan - Patient's Chronic Conditions and Co-Morbidity Symptoms are Monitored and Maintained or Improved: Monitor and assess patient's chronic conditions and comorbid symptoms for stability, deterioration, or improvement     Problem: Discharge Planning  Goal: Discharge to home or other facility with appropriate resources  Outcome: Progressing  Flowsheets  Taken 7/4/2025 1126 by Fernanda Mendenhall RN  Discharge to home or other facility with appropriate resources:   Identify barriers to discharge with patient and caregiver   Arrange for needed discharge resources and transportation as appropriate  Taken 7/4/2025 0401 by Chrissie Souza RN  Discharge to home or other facility with appropriate resources: Identify barriers to discharge with patient and caregiver  Taken 7/4/2025 0345 by Chrissie Souza RN  Discharge to home or other facility with appropriate resources:   Identify barriers to discharge with patient and caregiver   Arrange for needed discharge resources and transportation as appropriate   Identify discharge learning needs (meds, wound care, etc)     Problem: Safety - Adult  Goal: Free from fall injury  Outcome: Progressing  Flowsheets (Taken 7/4/2025 0419 by Chrissie Souza RN)  Free From Fall Injury: Based on caregiver fall risk screen, instruct family/caregiver to ask for assistance with transferring infant if caregiver noted to have fall risk factors

## 2025-07-04 NOTE — H&P
Dereje VELAZQUEZ MD   lactulose (CHRONULAC) 10 GM/15ML solution Take 30 mLs by mouth 3 times daily   Yes ProviderZeynep MD   spironolactone (ALDACTONE) 25 MG tablet Take 1 tablet by mouth daily 4/11/25  Yes Cristiano Walker APRN   nadolol (CORGARD) 40 MG tablet Take 1 tablet by mouth daily 4/11/25  Yes Cristiano Walker APRN   vitamin D (ERGOCALCIFEROL) 1.25 MG (03658 UT) CAPS capsule Take 1 capsule by mouth Once a week at 5 PM 2/3/25  Yes Dereje John MD   rifAXIMin (XIFAXAN) 550 MG tablet Take 1 tablet by mouth 2 times daily 1/17/25  Yes Cristiano Walker APRN   escitalopram (LEXAPRO) 20 MG tablet TAKE 1 TABLET BY MOUTH EVERY DAY EVERY MORNING 8/29/23  Yes ProviderZeynep MD   traZODone (DESYREL) 50 MG tablet TAKE 1 TABLET BY MOUTH EVERY DAY EVERY EVENING 9/28/23  Yes Provider, MD Zeynep   mupirocin (BACTROBAN) 2 % ointment Apply topically 2 times daily. 6/28/25 7/5/25  Jose Rios MD   clotrimazole-betamethasone (LOTRISONE) 1-0.05 % cream Apply topically 2 times daily to the scale and skin on ankle and toes with rash 6/28/25   Jose Rios MD I have reviewed all pertinent history. Prior medical records and laboratory evaluation reviewed. Imaging independently reviewed.    Review of Systems:   As per HPI, otherwise all other ROS performed and found to be negative at this time.    Physical Exam:  Vitals:    07/04/25 0006   BP: (!) 152/65   Pulse: 59   Resp: 20   Temp:    SpO2: 98%     CONSTITUTIONAL: Appears well developed and nourished, well groomed, in no apparent distress.   PSYCH: Judgement and insight are normal. Mental status alert and oriented to person, place and time. Mood and affect are normal  EYES: CARLITOS, symmetrical. Conjunctiva are moist, non-icteric. Lids are normal  ENT: Ears- No scars, lesions or masses. Hearing is grossly normal. Throat: Lips are normal. Oral mucosa is pink and moist.   NECK: Trachea is midline. Thyroid is non-tender.  LYMPHATIC: No

## 2025-07-04 NOTE — PROGRESS NOTES
Pharmacy Renal Adjustment    Yuli Duran is a 80 y.o. female. Pharmacy has renally adjusted medications per protocol.    Recent Labs     07/04/25  0000   BUN 47*       Recent Labs     07/04/25  0000   CREATININE 1.7*       Estimated Creatinine Clearance: 34 mL/min (A) (based on SCr of 1.7 mg/dL (H)).    Height:   Ht Readings from Last 1 Encounters:   07/03/25 1.651 m (5' 5\")     Weight:  Wt Readings from Last 1 Encounters:   07/03/25 110.2 kg (243 lb)       Plan: Adjust the following medications based on renal function:           Cefepime to 2000 mg IV once over 30 minutes followed by 2000 mg IV every 12 hours extended infusion over 240 minutes for SSTI in patient with BMI > 40      Electronically signed by Danielle Glover RPH on 7/4/2025 at 9:49 AM

## 2025-07-04 NOTE — PROGRESS NOTES
4 Eyes Skin Assessment     NAME:  Yuli Duran  YOB: 1945  MEDICAL RECORD NUMBER:  [id number]    The patient is being assessed for  Admission    I agree that at least one RN has performed a thorough Head to Toe Skin Assessment on the patient. ALL assessment sites listed below have been assessed.      Areas assessed by both nurses:    Head, Face, Ears, Shoulders, Back, Chest, Arms, Elbows, Hands, Sacrum. Buttock, Coccyx, Ischium, Legs. Feet and Heels, and Under Medical Devices         Does the Patient have a Wound? Yes wound(s) were present on assessment. LDA wound assessment was Initiated and completed by RN       Reid Prevention initiated by RN: Yes  Wound Care Orders initiated by RN: Yes    Pressure Injury (Stage 1,2,3,4, Unstageable, DTI, NWPT, and Complex wounds) if present, place Wound referral order by RN under : No    New Ostomies, if present place, Ostomy referral order under : No     Nurse 1 eSignature: Electronically signed by Chrissie Souza RN on 7/4/25 at 3:49 AM CDT    **SHARE this note so that the co-signing nurse can place an eSignature**    Nurse 2 eSignature: Electronically signed by Suzan Sanford RN on 7/4/25 at 3:51 AM CDT

## 2025-07-04 NOTE — ED PROVIDER NOTES
[facility] EMERGENCY DEPARTMENT  EMERGENCY DEPARTMENT ENCOUNTER      Pt Name: Yuli Duran  MRN: [id number]  Birthdate 1945  Date of evaluation: 7/3/2025  Provider: Mariaelena Villanueva PA-C    CHIEF COMPLAINT       Chief Complaint   Patient presents with    Fatigue    Medication Refill     Out of a  bunch of medications          HISTORY OF PRESENT ILLNESS   (Location/Symptom, Timing/Onset,Context/Setting, Quality, Duration, Modifying Factors, Severity)  Note limiting factors.   HPI    Yuli Duran is a 80 y.o. female with a past medical history significant for diabetes, nonalcoholic cirrhosis, subdural hematoma who presents to the emergency department with a chief complaint of fatigue, medication refills needed, altered mental status.  Patient presents with her family, her  and her daughter, who provides majority of history.  They state that for the past couple of days she has been confused.  They state that she is talking about things that did not actually happen, and occasionally struggling to find her words.  They state that she has had similar presentations with ammonia being high, and they were concerned about this.  She ran out of her Xifaxan and her furosemide today, and they could not get it refilled because their primary is out of town, and the pharmacy told them that they needed a doctor approval before they could do the refills.  They did not want her to miss any doses and were concerned about her intermittent confusion, so they brought her to the ED for further evaluation    Patient answers most questions appropriately, but occasionally struggles to find her words, and falls silent, deferring to family for help.    Nursing Notes were reviewed.    Limitations to history: Altered mental status  Outside historians daughter,     REVIEW OF SYSTEMS    (2-9 systems for level 4, 10 or more for level 5)     Review of Systems   Constitutional:  Positive for appetite change. Negative for

## 2025-07-04 NOTE — PROGRESS NOTES
Hospitalist Progress Note        PCP: Dereje John MD    Date of Admission: 7/3/2025    Length of Stay: 0    Chief Complaint: AMS from home. Hx of MILLER liver cirrhosis. Hx of SDH. Confused and struggling with word finding.     Pt apparently ran out of her xifaxan and lasix. Could not get it filled.     Subjective: pt reports she has not had any BM since admission.     She remains slow to respond, losing train of thought mid sentence and with persisting word finding difficulty.         Medications:  Reviewed    Infusion Medications    sodium chloride       Scheduled Medications    sodium chloride flush  5-40 mL IntraVENous 2 times per day    rifAXIMin  400 mg Oral TID    escitalopram  5 mg Oral Daily    levothyroxine  175 mcg Oral Daily with breakfast    nadolol  20 mg Oral Daily    traZODone  50 mg Oral Nightly    [START ON 7/10/2025] vitamin D  50,000 Units Oral Weekly    lactulose  20 g Oral Q2H    doxycycline (VIBRAMYCIN) IV  100 mg IntraVENous Q12H    cefepime  2,000 mg IntraVENous Q12H     PRN Meds: sodium chloride flush, sodium chloride, potassium chloride **OR** potassium alternative oral replacement **OR** potassium chloride, magnesium sulfate, ondansetron **OR** ondansetron, polyethylene glycol      Intake/Output Summary (Last 24 hours) at 7/4/2025 1217  Last data filed at 7/4/2025 1018  Gross per 24 hour   Intake 240 ml   Output 400 ml   Net -160 ml       Physical Exam Performed:    /63   Pulse 57   Temp 97.3 °F (36.3 °C) (Axillary)   Resp 16   Ht 1.651 m (5' 5\")   Wt 110.2 kg (243 lb)   SpO2 99%   BMI 40.44 kg/m²     General appearance: No apparent distress, appears stated age and cooperative.  HEENT: Pupils equal, round, and reactive to light. Conjunctivae/corneas clear.  Neck: Supple, with full range of motion. No jugular venous distention. Trachea midline.  Respiratory:  Normal respiratory effort. Clear to auscultation, bilaterally without Rales/Wheezes/Rhonchi.  Cardiovascular:

## 2025-07-04 NOTE — PROGRESS NOTES
Preliminary note  BLE venous scan  No DVT or SVT noted in BLE'S at this time  Calf veins were difficult to visualize  Pending final report

## 2025-07-04 NOTE — ED NOTES
Family at bedside asking how much longer it will be. LEIGH Ferrell notified.   
signed by: Electronically signed by Devika Newman RN on 7/4/2025 at 3:10 AM

## 2025-07-05 LAB
ALBUMIN SERPL-MCNC: 2.9 G/DL (ref 3.5–5.2)
ALP SERPL-CCNC: 142 U/L (ref 35–104)
ALT SERPL-CCNC: 32 U/L (ref 10–35)
AMMONIA PLAS-SCNC: 48 UMOL/L (ref 11–51)
ANION GAP SERPL CALCULATED.3IONS-SCNC: 6 MMOL/L (ref 8–16)
APTT PPP: 42.2 SEC (ref 26–36.2)
AST SERPL-CCNC: 61 U/L (ref 10–35)
BASOPHILS # BLD: 0 K/UL (ref 0–0.2)
BASOPHILS NFR BLD: 0.9 % (ref 0–1)
BILIRUB SERPL-MCNC: 1.3 MG/DL (ref 0.2–1.2)
BUN SERPL-MCNC: 42 MG/DL (ref 8–23)
CALCIUM SERPL-MCNC: 9 MG/DL (ref 8.8–10.2)
CHLORIDE SERPL-SCNC: 108 MMOL/L (ref 98–107)
CO2 SERPL-SCNC: 26 MMOL/L (ref 22–29)
CREAT SERPL-MCNC: 1.5 MG/DL (ref 0.5–0.9)
ECHO BSA: 2.25 M2
EOSINOPHIL # BLD: 0.2 K/UL (ref 0–0.6)
EOSINOPHIL NFR BLD: 5.4 % (ref 0–5)
ERYTHROCYTE [DISTWIDTH] IN BLOOD BY AUTOMATED COUNT: 15.5 % (ref 11.5–14.5)
GLUCOSE SERPL-MCNC: 91 MG/DL (ref 70–99)
HCT VFR BLD AUTO: 30.2 % (ref 37–47)
HGB BLD-MCNC: 10 G/DL (ref 12–16)
IMM GRANULOCYTES # BLD: 0 K/UL
INR PPP: 1.49 (ref 0.88–1.18)
LYMPHOCYTES # BLD: 1.2 K/UL (ref 1.1–4.5)
LYMPHOCYTES NFR BLD: 27.9 % (ref 20–40)
MCH RBC QN AUTO: 32.9 PG (ref 27–31)
MCHC RBC AUTO-ENTMCNC: 33.1 G/DL (ref 33–37)
MCV RBC AUTO: 99.3 FL (ref 81–99)
MONOCYTES # BLD: 0.6 K/UL (ref 0–0.9)
MONOCYTES NFR BLD: 12.7 % (ref 0–10)
NEUTROPHILS # BLD: 2.3 K/UL (ref 1.5–7.5)
NEUTS SEG NFR BLD: 53.1 % (ref 50–65)
PLATELET # BLD AUTO: 65 K/UL (ref 130–400)
PMV BLD AUTO: 11 FL (ref 9.4–12.3)
POTASSIUM SERPL-SCNC: 4.5 MMOL/L (ref 3.5–5)
PROT SERPL-MCNC: 5.8 G/DL (ref 6.4–8.3)
PROTHROMBIN TIME: 17.8 SEC (ref 12–14.6)
RBC # BLD AUTO: 3.04 M/UL (ref 4.2–5.4)
SODIUM SERPL-SCNC: 140 MMOL/L (ref 136–145)
WBC # BLD AUTO: 4.4 K/UL (ref 4.8–10.8)

## 2025-07-05 PROCEDURE — 85730 THROMBOPLASTIN TIME PARTIAL: CPT

## 2025-07-05 PROCEDURE — 6370000000 HC RX 637 (ALT 250 FOR IP): Performed by: INTERNAL MEDICINE

## 2025-07-05 PROCEDURE — 6370000000 HC RX 637 (ALT 250 FOR IP): Performed by: STUDENT IN AN ORGANIZED HEALTH CARE EDUCATION/TRAINING PROGRAM

## 2025-07-05 PROCEDURE — 6360000002 HC RX W HCPCS: Performed by: INTERNAL MEDICINE

## 2025-07-05 PROCEDURE — 1200000000 HC SEMI PRIVATE

## 2025-07-05 PROCEDURE — 93970 EXTREMITY STUDY: CPT | Performed by: SURGERY

## 2025-07-05 PROCEDURE — 80053 COMPREHEN METABOLIC PANEL: CPT

## 2025-07-05 PROCEDURE — 85025 COMPLETE CBC W/AUTO DIFF WBC: CPT

## 2025-07-05 PROCEDURE — 82140 ASSAY OF AMMONIA: CPT

## 2025-07-05 PROCEDURE — 36415 COLL VENOUS BLD VENIPUNCTURE: CPT

## 2025-07-05 PROCEDURE — 2500000003 HC RX 250 WO HCPCS: Performed by: STUDENT IN AN ORGANIZED HEALTH CARE EDUCATION/TRAINING PROGRAM

## 2025-07-05 PROCEDURE — 2580000003 HC RX 258: Performed by: INTERNAL MEDICINE

## 2025-07-05 PROCEDURE — 94760 N-INVAS EAR/PLS OXIMETRY 1: CPT

## 2025-07-05 PROCEDURE — 85610 PROTHROMBIN TIME: CPT

## 2025-07-05 RX ORDER — LACTULOSE 10 G/15ML
20 SOLUTION ORAL 2 TIMES DAILY
Status: DISCONTINUED | OUTPATIENT
Start: 2025-07-05 | End: 2025-07-06 | Stop reason: HOSPADM

## 2025-07-05 RX ADMIN — NADOLOL 20 MG: 20 TABLET ORAL at 09:41

## 2025-07-05 RX ADMIN — RIFAXIMIN 400 MG: 200 TABLET ORAL at 09:41

## 2025-07-05 RX ADMIN — LEVOTHYROXINE SODIUM 175 MCG: 0.12 TABLET ORAL at 09:41

## 2025-07-05 RX ADMIN — SODIUM CHLORIDE, PRESERVATIVE FREE 10 ML: 5 INJECTION INTRAVENOUS at 09:41

## 2025-07-05 RX ADMIN — LACTULOSE 20 G: 20 SOLUTION ORAL at 20:33

## 2025-07-05 RX ADMIN — RIFAXIMIN 400 MG: 200 TABLET ORAL at 13:41

## 2025-07-05 RX ADMIN — DOXYCYCLINE 100 MG: 100 INJECTION, POWDER, LYOPHILIZED, FOR SOLUTION INTRAVENOUS at 23:11

## 2025-07-05 RX ADMIN — TRAZODONE HYDROCHLORIDE 50 MG: 50 TABLET ORAL at 20:33

## 2025-07-05 RX ADMIN — LACTULOSE 20 G: 20 SOLUTION ORAL at 13:40

## 2025-07-05 RX ADMIN — ESCITALOPRAM OXALATE 5 MG: 10 TABLET ORAL at 09:41

## 2025-07-05 RX ADMIN — DOXYCYCLINE 100 MG: 100 INJECTION, POWDER, LYOPHILIZED, FOR SOLUTION INTRAVENOUS at 10:29

## 2025-07-05 RX ADMIN — CEFEPIME 2000 MG: 2 INJECTION, POWDER, FOR SOLUTION INTRAVENOUS at 12:08

## 2025-07-05 RX ADMIN — RIFAXIMIN 400 MG: 200 TABLET ORAL at 20:33

## 2025-07-05 NOTE — PLAN OF CARE
Problem: Chronic Conditions and Co-morbidities  Goal: Patient's chronic conditions and co-morbidity symptoms are monitored and maintained or improved  7/5/2025 1216 by Robert Dillon LPN  Outcome: Progressing  7/5/2025 1215 by Robert Dillon LPN  Outcome: Progressing  Flowsheets  Taken 7/5/2025 0730 by Robert Dillon LPN  Care Plan - Patient's Chronic Conditions and Co-Morbidity Symptoms are Monitored and Maintained or Improved:   Monitor and assess patient's chronic conditions and comorbid symptoms for stability, deterioration, or improvement   Collaborate with multidisciplinary team to address chronic and comorbid conditions and prevent exacerbation or deterioration   Update acute care plan with appropriate goals if chronic or comorbid symptoms are exacerbated and prevent overall improvement and discharge  Taken 7/5/2025 0413 by Dedra Stewart RN  Care Plan - Patient's Chronic Conditions and Co-Morbidity Symptoms are Monitored and Maintained or Improved: Monitor and assess patient's chronic conditions and comorbid symptoms for stability, deterioration, or improvement  Taken 7/5/2025 0030 by Dedra Stewart RN  Care Plan - Patient's Chronic Conditions and Co-Morbidity Symptoms are Monitored and Maintained or Improved: Monitor and assess patient's chronic conditions and comorbid symptoms for stability, deterioration, or improvement     Problem: Discharge Planning  Goal: Discharge to home or other facility with appropriate resources  7/5/2025 1216 by Robert Dillon LPN  Outcome: Progressing  7/5/2025 1215 by Robert Dillon LPN  Outcome: Progressing  Flowsheets  Taken 7/5/2025 0730 by Robert Dillon LPN  Discharge to home or other facility with appropriate resources:   Identify barriers to discharge with patient and caregiver   Arrange for needed discharge resources and transportation as appropriate   Identify discharge learning needs (meds, wound care,

## 2025-07-05 NOTE — CONSULTS
Comprehensive Nutrition Assessment    Type and Reason for Visit:  Consult    Nutrition Recommendations/Plan:   Modify diet to regular no added salt     Malnutrition Assessment:  Malnutrition Status:  At risk for malnutrition (07/05/25 1250)    Context:  Acute Illness     Findings of the 6 clinical characteristics of malnutrition:  Energy Intake:  No decrease in energy intake  Weight Loss:  Mild weight loss     Body Fat Loss:  No body fat loss     Muscle Mass Loss:  No muscle mass loss    Fluid Accumulation:  Mild     Strength:  Not Performed    Nutrition Assessment:    Received consult for diet education regarding cirrhosis. Pt reports hx of weight gain and loss related to fluid fluctuations. No significant weight changes noted in past 90 days. Pt desires to lose weight. Will modify diet to no added salt.    Nutrition Related Findings:    BUN 42, GFR 35, last A1c 5.2, BM 7-5 Wound Type: Venous Stasis       Current Nutrition Intake & Therapies:    Average Meal Intake: %  Average Supplements Intake: None Ordered  ADULT DIET; Regular    Anthropometric Measures:  Height: 165.1 cm (5' 5\")  Ideal Body Weight (IBW): 125 lbs (57 kg)     Current Body Weight: 110.2 kg (242 lb 15.2 oz), 194.4 % IBW.    Current BMI (kg/m2): 40.4  Usual Body Weight: 117.9 kg (259 lb 14.8 oz) (4-11-25)   % Weight Change (Calculated): -6.5  Weight Adjustment For: No Adjustment   BMI Categories: Obese Class 3 (BMI 40.0 or greater)    Estimated Daily Nutrient Needs:  Energy Requirements Based On: Kcal/kg  Weight Used for Energy Requirements: Current  Energy (kcal/day): 882-1653 (8-15/kg)  Weight Used for Protein Requirements: Ideal  Protein (g/day): >/=114 (2/kg)  Method Used for Fluid Requirements: 1 ml/kcal  Fluid (ml/day): 882-1653 (8-15/kg)    Nutrition Diagnosis:   Overweight/obese related to excessive energy intake as evidenced by BMI    Nutrition Interventions:   Food and/or Nutrient Delivery: Modify Current Diet  Nutrition

## 2025-07-05 NOTE — PROGRESS NOTES
Hospitalist Progress Note        PCP: Dereje John MD    Date of Admission: 7/3/2025    Length of Stay: 1    Chief Complaint: AMS from home. Hx of MILLER liver cirrhosis. Hx of SDH. Confused and struggling with word finding.     Pt apparently ran out of her xifaxan and lasix. Could not get it filled.     Subjective:       Had multiple BMs last night.     Better today - able to converse and memory improved, although still with some word finding difficulties.   Daughter at bedside today.           Medications:  Reviewed    Infusion Medications    sodium chloride       Scheduled Medications    lactulose  20 g Oral BID    sodium chloride flush  5-40 mL IntraVENous 2 times per day    rifAXIMin  400 mg Oral TID    escitalopram  5 mg Oral Daily    levothyroxine  175 mcg Oral Daily with breakfast    nadolol  20 mg Oral Daily    traZODone  50 mg Oral Nightly    [START ON 7/10/2025] vitamin D  50,000 Units Oral Weekly    doxycycline (VIBRAMYCIN) IV  100 mg IntraVENous Q12H    cefepime  2,000 mg IntraVENous Q12H     PRN Meds: sodium chloride flush, sodium chloride, potassium chloride **OR** potassium alternative oral replacement **OR** potassium chloride, magnesium sulfate, ondansetron **OR** ondansetron, polyethylene glycol      Intake/Output Summary (Last 24 hours) at 7/5/2025 1242  Last data filed at 7/5/2025 1033  Gross per 24 hour   Intake 1673 ml   Output --   Net 1673 ml       Physical Exam Performed:    BP (!) 109/57   Pulse 66   Temp 98.1 °F (36.7 °C) (Temporal)   Resp 16   Ht 1.651 m (5' 5\")   Wt 110.2 kg (243 lb)   SpO2 95%   BMI 40.44 kg/m²     General appearance: No apparent distress, appears stated age and cooperative.  HEENT: Pupils equal, round, and reactive to light. Conjunctivae/corneas clear.  Neck: Supple, with full range of motion. No jugular venous distention. Trachea midline.  Respiratory:  Normal respiratory effort. Clear to auscultation, bilaterally without

## 2025-07-05 NOTE — PLAN OF CARE
Problem: Chronic Conditions and Co-morbidities  Goal: Patient's chronic conditions and co-morbidity symptoms are monitored and maintained or improved  Outcome: Progressing  Flowsheets  Taken 7/5/2025 0730 by Robert Dillon LPN  Care Plan - Patient's Chronic Conditions and Co-Morbidity Symptoms are Monitored and Maintained or Improved:   Monitor and assess patient's chronic conditions and comorbid symptoms for stability, deterioration, or improvement   Collaborate with multidisciplinary team to address chronic and comorbid conditions and prevent exacerbation or deterioration   Update acute care plan with appropriate goals if chronic or comorbid symptoms are exacerbated and prevent overall improvement and discharge  Taken 7/5/2025 0413 by Dedra Stewart RN  Care Plan - Patient's Chronic Conditions and Co-Morbidity Symptoms are Monitored and Maintained or Improved: Monitor and assess patient's chronic conditions and comorbid symptoms for stability, deterioration, or improvement  Taken 7/5/2025 0030 by Dedra Stewart RN  Care Plan - Patient's Chronic Conditions and Co-Morbidity Symptoms are Monitored and Maintained or Improved: Monitor and assess patient's chronic conditions and comorbid symptoms for stability, deterioration, or improvement     Problem: Discharge Planning  Goal: Discharge to home or other facility with appropriate resources  Outcome: Progressing  Flowsheets  Taken 7/5/2025 0730 by Robert Dillon LPN  Discharge to home or other facility with appropriate resources:   Identify barriers to discharge with patient and caregiver   Arrange for needed discharge resources and transportation as appropriate   Identify discharge learning needs (meds, wound care, etc)   Refer to discharge planning if patient needs post-hospital services based on physician order or complex needs related to functional status, cognitive ability or social support system  Taken 7/5/2025 0413 by Dedra Stewart

## 2025-07-05 NOTE — PROGRESS NOTES
Nutrition Education    Educated on Sodium/protein management for cirrhosis  Learners: Patient and Significant Other  Readiness: Eager  Method: Explanation and Handout  Response: Verbalizes Understanding  Contact name and number provided.    Shanice Farley RD  Contact Number: [phone]

## 2025-07-06 VITALS
HEIGHT: 65 IN | OXYGEN SATURATION: 95 % | RESPIRATION RATE: 20 BRPM | TEMPERATURE: 97.2 F | BODY MASS INDEX: 40.48 KG/M2 | HEART RATE: 59 BPM | WEIGHT: 243 LBS | SYSTOLIC BLOOD PRESSURE: 144 MMHG | DIASTOLIC BLOOD PRESSURE: 59 MMHG

## 2025-07-06 LAB
ALBUMIN SERPL-MCNC: 3.3 G/DL (ref 3.5–5.2)
ALP SERPL-CCNC: 156 U/L (ref 35–104)
ALT SERPL-CCNC: 35 U/L (ref 10–35)
AMMONIA PLAS-SCNC: 34 UMOL/L (ref 11–51)
ANION GAP SERPL CALCULATED.3IONS-SCNC: 7 MMOL/L (ref 8–16)
AST SERPL-CCNC: 58 U/L (ref 10–35)
BASOPHILS # BLD: 0 K/UL (ref 0–0.2)
BASOPHILS NFR BLD: 0.6 % (ref 0–1)
BILIRUB SERPL-MCNC: 1.5 MG/DL (ref 0.2–1.2)
BUN SERPL-MCNC: 36 MG/DL (ref 8–23)
CALCIUM SERPL-MCNC: 9.4 MG/DL (ref 8.8–10.2)
CHLORIDE SERPL-SCNC: 109 MMOL/L (ref 98–107)
CO2 SERPL-SCNC: 26 MMOL/L (ref 22–29)
CREAT SERPL-MCNC: 1.2 MG/DL (ref 0.5–0.9)
EOSINOPHIL # BLD: 0.3 K/UL (ref 0–0.6)
EOSINOPHIL NFR BLD: 6.2 % (ref 0–5)
ERYTHROCYTE [DISTWIDTH] IN BLOOD BY AUTOMATED COUNT: 15.4 % (ref 11.5–14.5)
GLUCOSE SERPL-MCNC: 90 MG/DL (ref 70–99)
HCT VFR BLD AUTO: 35 % (ref 37–47)
HGB BLD-MCNC: 11.3 G/DL (ref 12–16)
IMM GRANULOCYTES # BLD: 0 K/UL
LYMPHOCYTES # BLD: 1.3 K/UL (ref 1.1–4.5)
LYMPHOCYTES NFR BLD: 26.6 % (ref 20–40)
MCH RBC QN AUTO: 32.8 PG (ref 27–31)
MCHC RBC AUTO-ENTMCNC: 32.3 G/DL (ref 33–37)
MCV RBC AUTO: 101.4 FL (ref 81–99)
MONOCYTES # BLD: 0.5 K/UL (ref 0–0.9)
MONOCYTES NFR BLD: 8.9 % (ref 0–10)
NEUTROPHILS # BLD: 2.9 K/UL (ref 1.5–7.5)
NEUTS SEG NFR BLD: 57.3 % (ref 50–65)
PLATELET # BLD AUTO: 64 K/UL (ref 130–400)
PMV BLD AUTO: 10.8 FL (ref 9.4–12.3)
POTASSIUM SERPL-SCNC: 4.5 MMOL/L (ref 3.5–5)
PROT SERPL-MCNC: 6.7 G/DL (ref 6.4–8.3)
RBC # BLD AUTO: 3.45 M/UL (ref 4.2–5.4)
SODIUM SERPL-SCNC: 142 MMOL/L (ref 136–145)
WBC # BLD AUTO: 5 K/UL (ref 4.8–10.8)

## 2025-07-06 PROCEDURE — 6370000000 HC RX 637 (ALT 250 FOR IP): Performed by: STUDENT IN AN ORGANIZED HEALTH CARE EDUCATION/TRAINING PROGRAM

## 2025-07-06 PROCEDURE — 2500000003 HC RX 250 WO HCPCS: Performed by: STUDENT IN AN ORGANIZED HEALTH CARE EDUCATION/TRAINING PROGRAM

## 2025-07-06 PROCEDURE — 94760 N-INVAS EAR/PLS OXIMETRY 1: CPT

## 2025-07-06 PROCEDURE — 80053 COMPREHEN METABOLIC PANEL: CPT

## 2025-07-06 PROCEDURE — 36415 COLL VENOUS BLD VENIPUNCTURE: CPT

## 2025-07-06 PROCEDURE — 6360000002 HC RX W HCPCS: Performed by: INTERNAL MEDICINE

## 2025-07-06 PROCEDURE — 85025 COMPLETE CBC W/AUTO DIFF WBC: CPT

## 2025-07-06 PROCEDURE — 2580000003 HC RX 258: Performed by: INTERNAL MEDICINE

## 2025-07-06 PROCEDURE — 6370000000 HC RX 637 (ALT 250 FOR IP): Performed by: INTERNAL MEDICINE

## 2025-07-06 PROCEDURE — 82140 ASSAY OF AMMONIA: CPT

## 2025-07-06 RX ORDER — SPIRONOLACTONE 25 MG/1
25 TABLET ORAL DAILY
Status: DISCONTINUED | OUTPATIENT
Start: 2025-07-06 | End: 2025-07-06 | Stop reason: HOSPADM

## 2025-07-06 RX ORDER — LACTULOSE 10 G/15ML
20 SOLUTION ORAL 3 TIMES DAILY
Qty: 120 EACH | Refills: 2 | Status: SHIPPED | OUTPATIENT
Start: 2025-07-06

## 2025-07-06 RX ORDER — SPIRONOLACTONE 25 MG/1
25 TABLET ORAL DAILY
Qty: 30 TABLET | Refills: 3 | Status: SHIPPED | OUTPATIENT
Start: 2025-07-06

## 2025-07-06 RX ORDER — FUROSEMIDE 40 MG/1
40 TABLET ORAL DAILY
Status: DISCONTINUED | OUTPATIENT
Start: 2025-07-06 | End: 2025-07-06 | Stop reason: HOSPADM

## 2025-07-06 RX ORDER — FUROSEMIDE 40 MG/1
40 TABLET ORAL 2 TIMES DAILY
Qty: 60 TABLET | Refills: 1 | Status: SHIPPED | OUTPATIENT
Start: 2025-07-06 | End: 2025-07-11

## 2025-07-06 RX ADMIN — RIFAXIMIN 400 MG: 200 TABLET ORAL at 08:28

## 2025-07-06 RX ADMIN — SODIUM CHLORIDE, PRESERVATIVE FREE 10 ML: 5 INJECTION INTRAVENOUS at 08:29

## 2025-07-06 RX ADMIN — LEVOTHYROXINE SODIUM 175 MCG: 0.12 TABLET ORAL at 08:29

## 2025-07-06 RX ADMIN — ESCITALOPRAM OXALATE 5 MG: 10 TABLET ORAL at 08:29

## 2025-07-06 RX ADMIN — CEFEPIME 2000 MG: 2 INJECTION, POWDER, FOR SOLUTION INTRAVENOUS at 00:19

## 2025-07-06 RX ADMIN — SODIUM CHLORIDE, PRESERVATIVE FREE 10 ML: 5 INJECTION INTRAVENOUS at 00:19

## 2025-07-06 RX ADMIN — LACTULOSE 20 G: 20 SOLUTION ORAL at 08:29

## 2025-07-06 RX ADMIN — NADOLOL 20 MG: 20 TABLET ORAL at 08:28

## 2025-07-06 RX ADMIN — FUROSEMIDE 40 MG: 40 TABLET ORAL at 08:29

## 2025-07-06 RX ADMIN — SPIRONOLACTONE 25 MG: 25 TABLET ORAL at 08:29

## 2025-07-06 NOTE — DISCHARGE SUMMARY
Hospital Medicine Discharge Summary    Patient ID: Yuli Duran      Patient's PCP: Dereje John MD    Admit Date: 7/3/2025     Discharge Date: 7/6/2025      Admitting Provider: No admitting provider for patient encounter.     Discharge Provider: Taurus Mack MD     Discharge Diagnoses:       Active Hospital Problems    Diagnosis     Hepatic encephalopathy (HCC) [K76.82]        The patient was seen and examined on day of discharge and this discharge summary is in conjunction with any daily progress note from day of discharge.    Hospital Course: 79yo woman presented with AMS from home. Hx of MILLER liver cirrhosis. Hx of SDH, Hx of hepatic encephalopathy. Confused and struggling with word finding.      Pt apparently ran out of her xifaxan and lasix. Could not get it filled.       Hepatic encephalopathy.   Responded well to increased frequency lactulose and cont xifaxan.   CT head non focal.    - refills on both xifaxan and lactulose sent to pharmacy.               B/l LE cellulitis in setting of chronic venous stasis dermatitis.   OP keflex without effect.   Treat with doxy and cefepime IV with close reassess - improved.   Check meghan doppler US - this is negative.     - PO augmentin 5 additional days on discharge.          Resuming both lasix and aldactone on discharge.             Physical Exam Performed:     BP (!) 144/59   Pulse 59   Temp 97.2 °F (36.2 °C) (Temporal)   Resp 20   Ht 1.651 m (5' 5\")   Wt 110.2 kg (243 lb)   SpO2 95%   BMI 40.44 kg/m²       General appearance: No apparent distress, appears stated age and cooperative.  HEENT: Pupils equal, round, and reactive to light. Conjunctivae/corneas clear.  Neck: Supple, with full range of motion. No jugular venous distention. Trachea midline.  Respiratory:  Normal respiratory effort. Clear to auscultation, bilaterally without Rales/Wheezes/Rhonchi.  Cardiovascular: Regular rate and rhythm with normal S1/S2 without murmurs, rubs

## 2025-07-07 ENCOUNTER — CARE COORDINATION (OUTPATIENT)
Dept: CARE COORDINATION | Age: 80
End: 2025-07-07

## 2025-07-07 DIAGNOSIS — K76.82 HEPATIC ENCEPHALOPATHY (HCC): Primary | ICD-10-CM

## 2025-07-07 DIAGNOSIS — F34.1 PERSISTENT DEPRESSIVE DISORDER: ICD-10-CM

## 2025-07-07 DIAGNOSIS — G47.00 INSOMNIA, UNSPECIFIED TYPE: Primary | ICD-10-CM

## 2025-07-07 PROCEDURE — 1111F DSCHRG MED/CURRENT MED MERGE: CPT

## 2025-07-07 RX ORDER — TRAZODONE HYDROCHLORIDE 50 MG/1
50 TABLET ORAL NIGHTLY
Qty: 30 TABLET | Refills: 3 | Status: SHIPPED | OUTPATIENT
Start: 2025-07-07

## 2025-07-07 NOTE — TELEPHONE ENCOUNTER
Yuli R Patterson called to request a refill on her medication.      Last office visit : 6/16/25  Next office visit : 7/15/25    Requested Prescriptions     Pending Prescriptions Disp Refills    traZODone (DESYREL) 50 MG tablet 30 tablet 3     Sig: Take 1 tablet by mouth nightly       Spoke with patient's daughter to schedule a TCM appointment. She is out of trazodone and needs a refill     Sherly Soto MA

## 2025-07-07 NOTE — CARE COORDINATION
Ambulatory Care Coordination Note     2025 9:30 AM     Patient Current Location:  Home: [address] [place] [address]     ACM contacted the patient by telephone. Verified name and  with patient as identifiers.         ACM: Kay Denny RN     Challenges to be reviewed by the provider   Additional needs identified to be addressed with provider Yes  medications- Patient is out of Trazodone and is requesting a refill. Please reach out to patient regarding refill status.     Patient was admitted from 7/3 to  for cute hepatic encephalopathy. She was DC to home with Augmentin x5 days and new order for Lactulose 30ml, TID.      Patient will need Furosemide order confirmed as there are two on file.     Patient will need HFU scheduled, please reach out to patient to assist with scheduling.           Method of communication with provider: chart routing.    Utilization: Has the patient been discharged from the hospital since your last call? yes -   Call within 2 business days of discharge: Yes    Patient: Yuli Duran    Patient : 1945   MRN: <[id number]>    Reason for Admission: hepatic encephalopathy  Discharge Date: 25  RURS: Readmission Risk Score: 22.7      Last Discharge Facility       Date Complaint Diagnosis Description Type Department Provider    7/3/25 Fatigue; Medication Refill Hyperammonemic encephalopathy ... ED to Hosp-Admission (Discharged) (ADMITTED) [facility] 5 SURG Taurus Mack MD; [place]...            Was this an external facility discharge? No    Ambulatory Care Manager reviewed discharge instructions, medical action plan, and red flags with patient and dtr Adrienne. The patient and family was given an opportunity to ask questions; all questions answered at this time.. The patient and family verbalized understanding.   Were discharge instructions available to patient? Yes.   Reviewed appropriate site of care based on symptoms and resources available to patient

## 2025-07-08 ENCOUNTER — TELEPHONE (OUTPATIENT)
Dept: GASTROENTEROLOGY | Age: 80
End: 2025-07-08

## 2025-07-08 ENCOUNTER — CARE COORDINATION (OUTPATIENT)
Dept: CARE COORDINATION | Age: 80
End: 2025-07-08

## 2025-07-08 LAB
EKG P AXIS: 12 DEGREES
EKG P-R INTERVAL: 194 MS
EKG Q-T INTERVAL: 506 MS
EKG QRS DURATION: 92 MS
EKG QTC CALCULATION (BAZETT): 501 MS
EKG T AXIS: -95 DEGREES

## 2025-07-08 NOTE — TELEPHONE ENCOUNTER
07- Byron Spouse called stated that [facility] is needing the office to call and give approval for patients Xifaxan that was prescribed by ALEX Mack MD       Explained that the patient has been approved for the Xifaxan through [facility] Patient assistance on 03- til 12-31-25 ID# [id number]    Recommended that he contact them regarding the medication-explained that it is shipped to the home.         Called [facility] Ph [phone]  Spoke with [facility] notified that the patient has been approved for [facility] patient assistance and they are going to contact them.

## 2025-07-08 NOTE — PROGRESS NOTES
Physician Progress Note      PATIENT:               CYNTHIA COURTNEY  [facility] #:                  [id number]  :                       1945  ADMIT DATE:       7/3/2025 11:20 PM  DISCH DATE:        2025 1:45 PM  RESPONDING  PROVIDER #:        Taurus Mack MD          QUERY TEXT:    hyponatremia is documented in the H&P 2025 Please provide additional   clinical indicators supportive of your documentation. Or please document if   the diagnosis of hyponatremia has been ruled out after study.    The clinical indicators include:  \"Hyponatremia- (H&P 2025, Josh Kim MD)    Sodium on  -135  Sodium on  -140  Sodium on  -142    -  IVF      Thank you  Mariah Ball CDS [facility]  Options provided:  -- Hyponatremia present as evidenced by, Please document evidence.  -- Hyponatremia was ruled out after study  -- Other - I will add my own diagnosis  -- Disagree - Not applicable / Not valid  -- Disagree - Clinically unable to determine / Unknown  -- Refer to Clinical Documentation Reviewer    PROVIDER RESPONSE TEXT:    Hyponatremia was ruled out after study.    Query created by: aMriah Ball on 2025 8:54 AM      QUERY TEXT:    Acute Kidney Injury is documented in the H&P 2025 Please provide   additional clinical indicators supportive of your documentation. Or please   document if the diagnosis of BENJI has been ruled out after study.    The clinical indicators include:  \"BENJI on CKD: Avoid nephrotoxic drugs, Gentle IVF hydration- (H&P 2025,   Josh Kim MD)    Creatinine on  -1.7, GFR- 30, BUN- 47  Creatinine on  -1.5, GFR- 35, BUN- 42  Creatinine on  -1.2, GFR- 46, BUN- 36    - Avoid nephrotoxic drugs, IVF        Thank you  Mariah Ball [facility]  Options provided:  -- Acute kidney injury evidenced by, Please document evidence as well as a   numerical baseline creatinine, if known.  -- CKD Without Acute kidney injury.  -- Other - I

## 2025-07-08 NOTE — CARE COORDINATION
Advance Care Planning Note      Date: 7/8/2025    Patient Current Location:  [place]    ACP Specialist:  ROSEMARY Johnson, PAVITHRA    Date Referral Received:6/13/25    Second Outreach:     Outreach call to patient in follow-up to ACP Specialist. Spoke with patient's daughter who inquired with patient if forms were received and she confirmed that she had them. Informed her that form should be turned into PCP office to be scanned in chart. Confirmed she had ACP Specialist number to call when they were ready to complete forms.     Next Step:    Closure letter Mailed to patient with invitation to contact ACP Specialist when ready       Thank you for this referral.

## 2025-07-09 NOTE — TELEPHONE ENCOUNTER
07- Patients  stopped by the office today as that she is       Called Cristal Chandra-explained that the patient is out of medication and is needing it shipped over night. Explained that patient patient has been  in the Hospital and just got out on 07-.        She stated it will be shipped out today and should be shipped there in the AM.       Patients  notified that 3 months supply is being shipped to their home.

## 2025-07-11 ENCOUNTER — TELEPHONE (OUTPATIENT)
Dept: GASTROENTEROLOGY | Age: 80
End: 2025-07-11

## 2025-07-11 ENCOUNTER — OFFICE VISIT (OUTPATIENT)
Dept: GASTROENTEROLOGY | Age: 80
End: 2025-07-11
Payer: MEDICARE

## 2025-07-11 VITALS
WEIGHT: 245 LBS | HEIGHT: 65 IN | SYSTOLIC BLOOD PRESSURE: 126 MMHG | HEART RATE: 56 BPM | DIASTOLIC BLOOD PRESSURE: 74 MMHG | BODY MASS INDEX: 40.82 KG/M2 | OXYGEN SATURATION: 96 %

## 2025-07-11 DIAGNOSIS — K74.69 OTHER CIRRHOSIS OF LIVER (HCC): Primary | ICD-10-CM

## 2025-07-11 DIAGNOSIS — K76.82 HEPATIC ENCEPHALOPATHY (HCC): ICD-10-CM

## 2025-07-11 PROCEDURE — G8417 CALC BMI ABV UP PARAM F/U: HCPCS | Performed by: NURSE PRACTITIONER

## 2025-07-11 PROCEDURE — 99214 OFFICE O/P EST MOD 30 MIN: CPT | Performed by: NURSE PRACTITIONER

## 2025-07-11 PROCEDURE — G8399 PT W/DXA RESULTS DOCUMENT: HCPCS | Performed by: NURSE PRACTITIONER

## 2025-07-11 PROCEDURE — 3074F SYST BP LT 130 MM HG: CPT | Performed by: NURSE PRACTITIONER

## 2025-07-11 PROCEDURE — 1123F ACP DISCUSS/DSCN MKR DOCD: CPT | Performed by: NURSE PRACTITIONER

## 2025-07-11 PROCEDURE — 1090F PRES/ABSN URINE INCON ASSESS: CPT | Performed by: NURSE PRACTITIONER

## 2025-07-11 PROCEDURE — 3078F DIAST BP <80 MM HG: CPT | Performed by: NURSE PRACTITIONER

## 2025-07-11 PROCEDURE — G8427 DOCREV CUR MEDS BY ELIG CLIN: HCPCS | Performed by: NURSE PRACTITIONER

## 2025-07-11 PROCEDURE — 1111F DSCHRG MED/CURRENT MED MERGE: CPT | Performed by: NURSE PRACTITIONER

## 2025-07-11 PROCEDURE — 1036F TOBACCO NON-USER: CPT | Performed by: NURSE PRACTITIONER

## 2025-07-11 NOTE — TELEPHONE ENCOUNTER
07- Cristal called [facility] to return call  [phone]  Ref case # [id number]      Called Cristal Spoke with Marcella   She stated that it was reported that the patient was hospitalized after taking 4 days of Xifaxan.       Notified that the patient was Hospitalized  on 07- for Hepatic Encephalopathy but that she was prescribed Xifaxan upon discharge on 07- by Dr ALEX Mack MD

## 2025-07-11 NOTE — PROGRESS NOTES
Subjective:     Patient ID: Yuli Duran is a 80 y.o. female  PCP: Dereje John MD  Referring Provider: No ref. provider found    HPI  History of Present Illness      Results        Assessment:     Assessment & Plan      {There are no diagnoses linked to this encounter. (Refresh or delete this SmartLink)}     Review of Systems    Plan:     ***  Orders  No orders of the defined types were placed in this encounter.    Medications  No orders of the defined types were placed in this encounter.        Patient History:     Past Medical History:   Diagnosis Date    Anxiety     Bruising     Diabetes mellitus (HCC)     Edema     Fracture of nasal bone     History of cellulitis     History of constipation     medication induced    Hypertension     Non-alcoholic cirrhosis (HCC)     SDH (subdural hematoma) (HCC) 10/30/2021    small, post fall ([facility]    Sleep disorder     Splenomegaly     Thyroid disease        Past Surgical History:   Procedure Laterality Date    CARDIAC PROCEDURE N/A 4/23/2025    Left heart cath / coronary angiography performed by Emery Elizabeth MD at [facility] CARDIAC CATH LAB    CHOLECYSTECTOMY  1978    COLONOSCOPY  2014    normal, per pt    COLONOSCOPY N/A 04/17/2023    Dr FAVIO Sandoval-AP x 1, 5 yr recall    HYSTERECTOMY (CERVIX STATUS UNKNOWN)  2000    [facility] STEREO BREAST BX W LOC DEVICE 1ST LESION RIGHT Right 04/27/2022    SETH STEROTACTIC LOC BREAST BIOPSY RIGHT 4/27/2022 [facility]    OVARY REMOVAL Bilateral 2000    AGE 55    UPPER GASTROINTESTINAL ENDOSCOPY N/A 03/14/2025    Dr FAVIO Sandoval-Small grade I varices, gastric varices, 1 yr recall       Family History   Problem Relation Age of Onset    Alcohol Abuse Mother     Heart Disease Mother     Lung Cancer Mother     Abdominal aortic aneurysm Mother     Stroke Mother     Breast Cancer Maternal Aunt 72    Breast Cancer Maternal Aunt 74    Stomach Cancer Maternal Uncle 65        Great Uncle    Alcohol Abuse Maternal

## 2025-07-14 ENCOUNTER — TELEPHONE (OUTPATIENT)
Dept: INTERNAL MEDICINE | Age: 80
End: 2025-07-14

## 2025-07-14 ENCOUNTER — CARE COORDINATION (OUTPATIENT)
Dept: CARE COORDINATION | Age: 80
End: 2025-07-14

## 2025-07-14 DIAGNOSIS — K76.82 HEPATIC ENCEPHALOPATHY (HCC): ICD-10-CM

## 2025-07-14 DIAGNOSIS — K74.69 OTHER CIRRHOSIS OF LIVER (HCC): ICD-10-CM

## 2025-07-14 RX ORDER — FUROSEMIDE 40 MG/1
40 TABLET ORAL SEE ADMIN INSTRUCTIONS
Qty: 90 TABLET | Refills: 1 | Status: SHIPPED | OUTPATIENT
Start: 2025-07-14

## 2025-07-14 NOTE — CARE COORDINATION
Ambulatory Care Coordination Note     7/14/2025 4:55 PM     Patient has graduated from the Complex Case Management  program on 7/14/25.  Patient/family has the ability to self-manage at this time.  Care management goals have been completed. No further Ambulatory Care Manager follow up scheduled.     Goals Addressed                   This Visit's Progress     COMPLETED: Conditions and Symptoms   On track            Patient has Ambulatory Care Manager's contact information for any further questions, concerns, or needs.  Patients upcoming visits:    Future Appointments   Date Time Provider Department Center   7/15/2025  3:00 PM Dereje John MD [facility] DEP   8/21/2025 11:00 AM Emery Elizabeth MD N [facility] Cardio [facility]   10/10/2025  1:00 PM Cristiano Walker APRN N [facility] Gastro [facility]   10/16/2025  2:15 PM Shanice Abdi APRN N PAD HEMONC [facility]   10/16/2025  2:15 PM SCHEDULE, L MED ONC MA L MED ONC Felicia [facility]   10/31/2025  1:00 PM Dereje John MD [facility] DEP   11/6/2025  1:30 PM Emery Elizabeth MD N [facility] Cardio [facility]       Zoe NORWOOD, RN  Respecting Choices® Advanced Steps ACP Facilitator  Ambulatory Care Manager  Dionte Conrad [facility]  [phone]

## 2025-07-14 NOTE — TELEPHONE ENCOUNTER
Daughter request a refill for patient on:  Furosemide-completely out of  Trazodone  Send to [facility] next to gris

## 2025-07-15 ENCOUNTER — OFFICE VISIT (OUTPATIENT)
Dept: INTERNAL MEDICINE | Age: 80
End: 2025-07-15

## 2025-07-15 VITALS
DIASTOLIC BLOOD PRESSURE: 70 MMHG | SYSTOLIC BLOOD PRESSURE: 128 MMHG | OXYGEN SATURATION: 96 % | BODY MASS INDEX: 40.15 KG/M2 | HEIGHT: 65 IN | HEART RATE: 54 BPM | WEIGHT: 241 LBS

## 2025-07-15 DIAGNOSIS — E72.20 HYPERAMMONEMIA: Primary | ICD-10-CM

## 2025-07-15 DIAGNOSIS — E72.20 HYPERAMMONEMIA: ICD-10-CM

## 2025-07-15 DIAGNOSIS — K76.82 HEPATIC ENCEPHALOPATHY (HCC): ICD-10-CM

## 2025-07-15 DIAGNOSIS — L03.90 CHRONIC CELLULITIS: ICD-10-CM

## 2025-07-15 LAB — AMMONIA PLAS-SCNC: 107 UMOL/L (ref 11–51)

## 2025-07-15 ASSESSMENT — ENCOUNTER SYMPTOMS
ABDOMINAL DISTENTION: 0
WHEEZING: 0
SHORTNESS OF BREATH: 0
NAUSEA: 0
BLOOD IN STOOL: 0
ABDOMINAL PAIN: 0
SINUS PRESSURE: 0
EYE ITCHING: 0
VOMITING: 0
TROUBLE SWALLOWING: 0
SORE THROAT: 0
BACK PAIN: 0
EYE DISCHARGE: 0

## 2025-07-15 NOTE — PROGRESS NOTES
Thought content normal.         Judgment: Judgment normal.         Initial post-discharge communication occurred between nurse care coordinator and patient on 7/7/2025- see documentation in chart: telephone encounter.    Assessment/Plan:  Yuli \"Helen\" was seen today for follow-up from hospital.    Diagnoses and all orders for this visit:    Hyperammonemia    Hepatic encephalopathy (HCC)    Chronic cellulitis          Diagnostic test results reviewed: inpatient labs    Patient risk of morbidity and mortality: high    Medical Decision Making: high complexity

## 2025-07-18 ASSESSMENT — ENCOUNTER SYMPTOMS
COUGH: 0
BLOOD IN STOOL: 0
CHOKING: 0
ABDOMINAL PAIN: 0
ABDOMINAL DISTENTION: 0
TROUBLE SWALLOWING: 0
RECTAL PAIN: 0
VOMITING: 0
CONSTIPATION: 0
ANAL BLEEDING: 0
SHORTNESS OF BREATH: 0
DIARRHEA: 0
NAUSEA: 0

## 2025-07-18 NOTE — PROGRESS NOTES
Subjective:     Patient ID: Yuli Duran is a 80 y.o. female  PCP: Dereje John MD  Referring Provider: No ref. provider found    HPI  Patient presents to the office today with the following complaints: 3 Month Follow-Up    Patient seen in the office today for follow up on liver cirrhosis  She is accompanied by her daughter    She has been seen in the ER on several occassions since her last follow up with me 3 months ago  Her ER visits were for various reasons such as fatigue, wound checks, fatigue, swelling    She has had fluctuations in her ammonia level which in turn causes confusion  Her and daughter assure me that she is having 1-3 bowel movements per day and she is taking her lactulose as well as her diuretics, xifaxan and nadolol     Her most recent ammonia level was normal   Liver enzymes are stable     Today she is oriented and her daughter agrees that today has been a good day for her     They deny any needs at this time     We will continued every 3 month follow up appointments sooner is needed     I have reviewed her labs, imaging and ER visit notes   Greater than 40 minutes spent on this encounter, >50% of which was face-to-face with patient regarding counseling and discussion of treatment. Total time includes but is not limited to reviewing referral notes, reviewing labs/imaging and notes both in our EMR and through outside records, evaluating and examining the patient, discussing and formulating treatment, and writing the note.       Assessment:     1. Other cirrhosis of liver (HCC)  -     AFP Tumor Marker; Future  -     Hepatic Function Panel; Future  -     Protime-INR; Future  2. Hepatic encephalopathy (HCC)  -     AFP Tumor Marker; Future  -     Hepatic Function Panel; Future  -     Protime-INR; Future       Review of Systems   Constitutional:  Negative for activity change, appetite change, fatigue, fever and unexpected weight change.   HENT:  Negative for trouble swallowing.

## 2025-08-21 ENCOUNTER — OFFICE VISIT (OUTPATIENT)
Dept: CARDIOLOGY CLINIC | Age: 80
End: 2025-08-21
Payer: MEDICARE

## 2025-08-21 VITALS
HEART RATE: 64 BPM | WEIGHT: 232 LBS | BODY MASS INDEX: 38.65 KG/M2 | HEIGHT: 65 IN | DIASTOLIC BLOOD PRESSURE: 60 MMHG | SYSTOLIC BLOOD PRESSURE: 102 MMHG

## 2025-08-21 DIAGNOSIS — R07.9 CHEST PAIN IN ADULT: ICD-10-CM

## 2025-08-21 DIAGNOSIS — I10 PRIMARY HYPERTENSION: ICD-10-CM

## 2025-08-21 DIAGNOSIS — K74.60 CIRRHOSIS OF LIVER WITH ASCITES, UNSPECIFIED HEPATIC CIRRHOSIS TYPE (HCC): ICD-10-CM

## 2025-08-21 DIAGNOSIS — I50.32 CHRONIC DIASTOLIC (CONGESTIVE) HEART FAILURE (HCC): Primary | ICD-10-CM

## 2025-08-21 DIAGNOSIS — K76.82 HEPATIC ENCEPHALOPATHY (HCC): ICD-10-CM

## 2025-08-21 DIAGNOSIS — R18.8 CIRRHOSIS OF LIVER WITH ASCITES, UNSPECIFIED HEPATIC CIRRHOSIS TYPE (HCC): ICD-10-CM

## 2025-08-21 PROCEDURE — G8417 CALC BMI ABV UP PARAM F/U: HCPCS | Performed by: INTERNAL MEDICINE

## 2025-08-21 PROCEDURE — 1036F TOBACCO NON-USER: CPT | Performed by: INTERNAL MEDICINE

## 2025-08-21 PROCEDURE — 1123F ACP DISCUSS/DSCN MKR DOCD: CPT | Performed by: INTERNAL MEDICINE

## 2025-08-21 PROCEDURE — G8427 DOCREV CUR MEDS BY ELIG CLIN: HCPCS | Performed by: INTERNAL MEDICINE

## 2025-08-21 PROCEDURE — G8399 PT W/DXA RESULTS DOCUMENT: HCPCS | Performed by: INTERNAL MEDICINE

## 2025-08-21 PROCEDURE — 99213 OFFICE O/P EST LOW 20 MIN: CPT | Performed by: INTERNAL MEDICINE

## 2025-08-21 PROCEDURE — 1090F PRES/ABSN URINE INCON ASSESS: CPT | Performed by: INTERNAL MEDICINE

## 2025-08-21 RX ORDER — SPIRONOLACTONE 25 MG/1
25 TABLET ORAL DAILY
Qty: 30 TABLET | Refills: 3 | Status: SHIPPED | OUTPATIENT
Start: 2025-08-21

## 2025-08-21 RX ORDER — FUROSEMIDE 40 MG/1
40 TABLET ORAL 2 TIMES DAILY
Qty: 90 TABLET | Refills: 3 | Status: SHIPPED | OUTPATIENT
Start: 2025-08-21

## 2025-08-21 ASSESSMENT — ENCOUNTER SYMPTOMS
RESPIRATORY NEGATIVE: 1
NAUSEA: 0
DIARRHEA: 0
EYES NEGATIVE: 1
SHORTNESS OF BREATH: 0
GASTROINTESTINAL NEGATIVE: 1
VOMITING: 0

## (undated) DEVICE — GLIDESHEATH SLENDER STAINLESS STEEL KIT: Brand: GLIDESHEATH SLENDER

## (undated) DEVICE — GUIDEWIRE VASC L260CM DIA0038IN TIP L3MM PTFE J TIP FIX COR

## (undated) DEVICE — FORCEPS BX L240CM JAW DIA2.8MM L CAP W/ NDL MIC MESH TOOTH

## (undated) DEVICE — CATHETER COR DIAG JUDKINS L 3.5 CRV 6FR 100CM 0 SIDE H

## (undated) DEVICE — KIT MFLD ISOLATN NACL CNTRST PRT TBNG SPIK W/ PRSS TRNSDUC

## (undated) DEVICE — Device: Brand: NOMOLINE™ LH ADULT NASAL CO2 CANNULA WITH O2 4M

## (undated) DEVICE — ENDO KIT,LOURDES HOSPITAL: Brand: MEDLINE INDUSTRIES, INC.

## (undated) DEVICE — CATHETER DIAG 6FR L100CM LUMN ID0.056IN JR4 CRV 0 SIDE H

## (undated) DEVICE — CANNULA NSL AD L7FT DIV O2 CO2 W/ M LUERLOCK TRMPT CONN

## (undated) DEVICE — Device

## (undated) DEVICE — KIT ANGIO W/ AT P65 PREM HND CTRL FOR CNTRST DEL ANGIOTOUCH

## (undated) DEVICE — TR BAND RADIAL ARTERY COMPRESSION DEVICE: Brand: TR BAND

## (undated) DEVICE — SINGLE PORT MANIFOLD: Brand: NEPTUNE 2